# Patient Record
Sex: FEMALE | Race: WHITE | Employment: UNEMPLOYED | URBAN - METROPOLITAN AREA
[De-identification: names, ages, dates, MRNs, and addresses within clinical notes are randomized per-mention and may not be internally consistent; named-entity substitution may affect disease eponyms.]

---

## 2019-03-21 ENCOUNTER — APPOINTMENT (OUTPATIENT)
Dept: GENERAL RADIOLOGY | Age: 59
DRG: 312 | End: 2019-03-21
Attending: EMERGENCY MEDICINE
Payer: MEDICARE

## 2019-03-21 ENCOUNTER — HOSPITAL ENCOUNTER (INPATIENT)
Age: 59
LOS: 5 days | Discharge: HOME | DRG: 312 | End: 2019-03-26
Attending: EMERGENCY MEDICINE | Admitting: FAMILY MEDICINE
Payer: MEDICARE

## 2019-03-21 DIAGNOSIS — R55 SYNCOPE AND COLLAPSE: ICD-10-CM

## 2019-03-21 DIAGNOSIS — I65.23 BILATERAL CAROTID ARTERY STENOSIS: ICD-10-CM

## 2019-03-21 DIAGNOSIS — I65.23 CAROTID STENOSIS, BILATERAL: Primary | ICD-10-CM

## 2019-03-21 PROBLEM — R06.02 SOB (SHORTNESS OF BREATH): Status: ACTIVE | Noted: 2019-03-21

## 2019-03-21 PROBLEM — R07.9 ACUTE CHEST PAIN: Status: ACTIVE | Noted: 2019-03-21

## 2019-03-21 PROBLEM — R00.0 SINUS TACHYCARDIA: Status: ACTIVE | Noted: 2019-03-21

## 2019-03-21 LAB
ALBUMIN SERPL-MCNC: 2.4 G/DL (ref 3.5–5)
ALBUMIN/GLOB SERPL: 0.5 {RATIO} (ref 1.1–2.2)
ALP SERPL-CCNC: 83 U/L (ref 45–117)
ALT SERPL-CCNC: 20 U/L (ref 12–78)
ANION GAP SERPL CALC-SCNC: 10 MMOL/L (ref 5–15)
AST SERPL-CCNC: 22 U/L (ref 15–37)
BASOPHILS # BLD: 0.1 K/UL (ref 0–0.1)
BASOPHILS NFR BLD: 1 % (ref 0–1)
BILIRUB SERPL-MCNC: 0.2 MG/DL (ref 0.2–1)
BUN SERPL-MCNC: 16 MG/DL (ref 6–20)
BUN/CREAT SERPL: 12 (ref 12–20)
CALCIUM SERPL-MCNC: 8.3 MG/DL (ref 8.5–10.1)
CHLORIDE SERPL-SCNC: 102 MMOL/L (ref 97–108)
CO2 SERPL-SCNC: 22 MMOL/L (ref 21–32)
COMMENT, HOLDF: NORMAL
CREAT SERPL-MCNC: 1.35 MG/DL (ref 0.55–1.02)
DIFFERENTIAL METHOD BLD: ABNORMAL
EOSINOPHIL # BLD: 0.1 K/UL (ref 0–0.4)
EOSINOPHIL NFR BLD: 2 % (ref 0–7)
ERYTHROCYTE [DISTWIDTH] IN BLOOD BY AUTOMATED COUNT: 17.2 % (ref 11.5–14.5)
GLOBULIN SER CALC-MCNC: 4.4 G/DL (ref 2–4)
GLUCOSE SERPL-MCNC: 98 MG/DL (ref 65–100)
HCT VFR BLD AUTO: 30.4 % (ref 35–47)
HGB BLD-MCNC: 8.9 G/DL (ref 11.5–16)
IMM GRANULOCYTES # BLD AUTO: 0.1 K/UL (ref 0–0.04)
IMM GRANULOCYTES NFR BLD AUTO: 1 % (ref 0–0.5)
LYMPHOCYTES # BLD: 1.9 K/UL (ref 0.8–3.5)
LYMPHOCYTES NFR BLD: 29 % (ref 12–49)
MCH RBC QN AUTO: 33 PG (ref 26–34)
MCHC RBC AUTO-ENTMCNC: 29.3 G/DL (ref 30–36.5)
MCV RBC AUTO: 112.6 FL (ref 80–99)
MONOCYTES # BLD: 0.5 K/UL (ref 0–1)
MONOCYTES NFR BLD: 8 % (ref 5–13)
NEUTS SEG # BLD: 3.9 K/UL (ref 1.8–8)
NEUTS SEG NFR BLD: 59 % (ref 32–75)
NRBC # BLD: 0 K/UL (ref 0–0.01)
NRBC BLD-RTO: 0 PER 100 WBC
PLATELET # BLD AUTO: 238 K/UL (ref 150–400)
PMV BLD AUTO: 10.3 FL (ref 8.9–12.9)
POTASSIUM SERPL-SCNC: 4.8 MMOL/L (ref 3.5–5.1)
PROT SERPL-MCNC: 6.8 G/DL (ref 6.4–8.2)
RBC # BLD AUTO: 2.7 M/UL (ref 3.8–5.2)
RBC MORPH BLD: ABNORMAL
RBC MORPH BLD: ABNORMAL
SAMPLES BEING HELD,HOLD: NORMAL
SODIUM SERPL-SCNC: 134 MMOL/L (ref 136–145)
TROPONIN I SERPL-MCNC: <0.05 NG/ML
WBC # BLD AUTO: 6.6 K/UL (ref 3.6–11)

## 2019-03-21 PROCEDURE — 99284 EMERGENCY DEPT VISIT MOD MDM: CPT

## 2019-03-21 PROCEDURE — 71046 X-RAY EXAM CHEST 2 VIEWS: CPT

## 2019-03-21 PROCEDURE — 80053 COMPREHEN METABOLIC PANEL: CPT

## 2019-03-21 PROCEDURE — 85025 COMPLETE CBC W/AUTO DIFF WBC: CPT

## 2019-03-21 PROCEDURE — 94761 N-INVAS EAR/PLS OXIMETRY MLT: CPT

## 2019-03-21 PROCEDURE — 36415 COLL VENOUS BLD VENIPUNCTURE: CPT

## 2019-03-21 PROCEDURE — 93005 ELECTROCARDIOGRAM TRACING: CPT

## 2019-03-21 PROCEDURE — 65660000000 HC RM CCU STEPDOWN

## 2019-03-21 PROCEDURE — 83036 HEMOGLOBIN GLYCOSYLATED A1C: CPT

## 2019-03-21 PROCEDURE — 84484 ASSAY OF TROPONIN QUANT: CPT

## 2019-03-21 RX ORDER — MIDODRINE HYDROCHLORIDE 2.5 MG/1
2.5 TABLET ORAL
COMMUNITY
End: 2022-08-25

## 2019-03-21 RX ORDER — DEXTROSE 50 % IN WATER (D50W) INTRAVENOUS SYRINGE
25-50 AS NEEDED
Status: DISCONTINUED | OUTPATIENT
Start: 2019-03-21 | End: 2019-03-26 | Stop reason: HOSPADM

## 2019-03-21 RX ORDER — POLYETHYLENE GLYCOL 3350 17 G/17G
17 POWDER, FOR SOLUTION ORAL DAILY
COMMUNITY

## 2019-03-21 RX ORDER — INSULIN LISPRO 100 [IU]/ML
INJECTION, SOLUTION INTRAVENOUS; SUBCUTANEOUS
Status: DISCONTINUED | OUTPATIENT
Start: 2019-03-21 | End: 2019-03-26 | Stop reason: HOSPADM

## 2019-03-21 RX ORDER — DULOXETIN HYDROCHLORIDE 30 MG/1
30 CAPSULE, DELAYED RELEASE ORAL 2 TIMES DAILY
Status: ON HOLD | COMMUNITY
End: 2022-09-01 | Stop reason: SDUPTHER

## 2019-03-21 RX ORDER — LANOLIN ALCOHOL/MO/W.PET/CERES
10 CREAM (GRAM) TOPICAL
COMMUNITY
End: 2022-09-01

## 2019-03-21 RX ORDER — SODIUM CHLORIDE 0.9 % (FLUSH) 0.9 %
5-40 SYRINGE (ML) INJECTION EVERY 8 HOURS
Status: DISCONTINUED | OUTPATIENT
Start: 2019-03-21 | End: 2019-03-26 | Stop reason: HOSPADM

## 2019-03-21 RX ORDER — PANTOPRAZOLE SODIUM 40 MG/1
40 TABLET, DELAYED RELEASE ORAL 2 TIMES DAILY
COMMUNITY

## 2019-03-21 RX ORDER — ROSUVASTATIN CALCIUM 40 MG/1
40 TABLET, COATED ORAL
COMMUNITY

## 2019-03-21 RX ORDER — LANOLIN ALCOHOL/MO/W.PET/CERES
325 CREAM (GRAM) TOPICAL 2 TIMES DAILY WITH MEALS
Status: ON HOLD | COMMUNITY
End: 2022-09-01 | Stop reason: SDUPTHER

## 2019-03-21 RX ORDER — LANOLIN ALCOHOL/MO/W.PET/CERES
400 CREAM (GRAM) TOPICAL DAILY
COMMUNITY

## 2019-03-21 RX ORDER — METFORMIN HYDROCHLORIDE 500 MG/1
500 TABLET ORAL 2 TIMES DAILY WITH MEALS
COMMUNITY
End: 2022-08-25

## 2019-03-21 RX ORDER — ASPIRIN 81 MG/1
81 TABLET ORAL DAILY
COMMUNITY

## 2019-03-21 RX ORDER — SODIUM CHLORIDE 0.9 % (FLUSH) 0.9 %
5-40 SYRINGE (ML) INJECTION AS NEEDED
Status: DISCONTINUED | OUTPATIENT
Start: 2019-03-21 | End: 2019-03-26 | Stop reason: HOSPADM

## 2019-03-21 RX ORDER — ACETAMINOPHEN 500 MG
1000 TABLET ORAL
COMMUNITY
End: 2022-09-01

## 2019-03-21 RX ORDER — BUSPIRONE HYDROCHLORIDE 10 MG/1
10 TABLET ORAL 3 TIMES DAILY
Status: ON HOLD | COMMUNITY
End: 2022-08-30 | Stop reason: SDUPTHER

## 2019-03-21 RX ORDER — MAGNESIUM SULFATE 100 %
4 CRYSTALS MISCELLANEOUS AS NEEDED
Status: DISCONTINUED | OUTPATIENT
Start: 2019-03-21 | End: 2019-03-26 | Stop reason: HOSPADM

## 2019-03-22 LAB
ANION GAP SERPL CALC-SCNC: 10 MMOL/L (ref 5–15)
APPEARANCE UR: ABNORMAL
ATRIAL RATE: 104 BPM
BACTERIA URNS QL MICRO: NEGATIVE /HPF
BASOPHILS # BLD: 0 K/UL (ref 0–0.1)
BASOPHILS NFR BLD: 0 % (ref 0–1)
BILIRUB UR QL: NEGATIVE
BUN SERPL-MCNC: 13 MG/DL (ref 6–20)
BUN/CREAT SERPL: 12 (ref 12–20)
CALCIUM SERPL-MCNC: 7.9 MG/DL (ref 8.5–10.1)
CALCULATED P AXIS, ECG09: 27 DEGREES
CALCULATED R AXIS, ECG10: -23 DEGREES
CALCULATED T AXIS, ECG11: 36 DEGREES
CHLORIDE SERPL-SCNC: 103 MMOL/L (ref 97–108)
CO2 SERPL-SCNC: 22 MMOL/L (ref 21–32)
COLOR UR: ABNORMAL
CREAT SERPL-MCNC: 1.13 MG/DL (ref 0.55–1.02)
DIAGNOSIS, 93000: NORMAL
DIFFERENTIAL METHOD BLD: ABNORMAL
EOSINOPHIL # BLD: 0.2 K/UL (ref 0–0.4)
EOSINOPHIL NFR BLD: 4 % (ref 0–7)
EPITH CASTS URNS QL MICRO: ABNORMAL /LPF
ERYTHROCYTE [DISTWIDTH] IN BLOOD BY AUTOMATED COUNT: 17.3 % (ref 11.5–14.5)
EST. AVERAGE GLUCOSE BLD GHB EST-MCNC: 120 MG/DL
GLUCOSE BLD STRIP.AUTO-MCNC: 132 MG/DL (ref 65–100)
GLUCOSE BLD STRIP.AUTO-MCNC: 139 MG/DL (ref 65–100)
GLUCOSE BLD STRIP.AUTO-MCNC: 183 MG/DL (ref 65–100)
GLUCOSE BLD STRIP.AUTO-MCNC: 63 MG/DL (ref 65–100)
GLUCOSE BLD STRIP.AUTO-MCNC: 69 MG/DL (ref 65–100)
GLUCOSE BLD STRIP.AUTO-MCNC: 81 MG/DL (ref 65–100)
GLUCOSE BLD STRIP.AUTO-MCNC: 86 MG/DL (ref 65–100)
GLUCOSE SERPL-MCNC: 96 MG/DL (ref 65–100)
GLUCOSE UR STRIP.AUTO-MCNC: NEGATIVE MG/DL
HBA1C MFR BLD: 5.8 % (ref 4.2–6.3)
HCT VFR BLD AUTO: 27 % (ref 35–47)
HCT VFR BLD AUTO: 29.3 % (ref 35–47)
HGB BLD-MCNC: 7.9 G/DL (ref 11.5–16)
HGB BLD-MCNC: 8.7 G/DL (ref 11.5–16)
HGB UR QL STRIP: ABNORMAL
IMM GRANULOCYTES # BLD AUTO: 0 K/UL (ref 0–0.04)
IMM GRANULOCYTES NFR BLD AUTO: 0 % (ref 0–0.5)
KETONES UR QL STRIP.AUTO: NEGATIVE MG/DL
LEUKOCYTE ESTERASE UR QL STRIP.AUTO: ABNORMAL
LYMPHOCYTES # BLD: 1.3 K/UL (ref 0.8–3.5)
LYMPHOCYTES NFR BLD: 31 % (ref 12–49)
MCH RBC QN AUTO: 32.4 PG (ref 26–34)
MCHC RBC AUTO-ENTMCNC: 29.3 G/DL (ref 30–36.5)
MCV RBC AUTO: 110.7 FL (ref 80–99)
MONOCYTES # BLD: 0.3 K/UL (ref 0–1)
MONOCYTES NFR BLD: 8 % (ref 5–13)
NEUTS SEG # BLD: 2.3 K/UL (ref 1.8–8)
NEUTS SEG NFR BLD: 57 % (ref 32–75)
NITRITE UR QL STRIP.AUTO: NEGATIVE
NRBC # BLD: 0 K/UL (ref 0–0.01)
NRBC BLD-RTO: 0 PER 100 WBC
P-R INTERVAL, ECG05: 134 MS
PH UR STRIP: 5 [PH] (ref 5–8)
PLATELET # BLD AUTO: 185 K/UL (ref 150–400)
PMV BLD AUTO: 10.3 FL (ref 8.9–12.9)
POTASSIUM SERPL-SCNC: 4.1 MMOL/L (ref 3.5–5.1)
PROT UR STRIP-MCNC: NEGATIVE MG/DL
Q-T INTERVAL, ECG07: 372 MS
QRS DURATION, ECG06: 104 MS
QTC CALCULATION (BEZET), ECG08: 489 MS
RBC # BLD AUTO: 2.44 M/UL (ref 3.8–5.2)
RBC #/AREA URNS HPF: ABNORMAL /HPF (ref 0–5)
RBC MORPH BLD: ABNORMAL
SERVICE CMNT-IMP: ABNORMAL
SERVICE CMNT-IMP: NORMAL
SODIUM SERPL-SCNC: 135 MMOL/L (ref 136–145)
SP GR UR REFRACTOMETRY: 1.01 (ref 1–1.03)
TROPONIN I SERPL-MCNC: <0.05 NG/ML
UA: UC IF INDICATED,UAUC: ABNORMAL
UROBILINOGEN UR QL STRIP.AUTO: 0.2 EU/DL (ref 0.2–1)
VENTRICULAR RATE, ECG03: 104 BPM
WBC # BLD AUTO: 4.1 K/UL (ref 3.6–11)
WBC URNS QL MICRO: ABNORMAL /HPF (ref 0–4)

## 2019-03-22 PROCEDURE — 87086 URINE CULTURE/COLONY COUNT: CPT

## 2019-03-22 PROCEDURE — 81001 URINALYSIS AUTO W/SCOPE: CPT

## 2019-03-22 PROCEDURE — 74011250637 HC RX REV CODE- 250/637: Performed by: FAMILY MEDICINE

## 2019-03-22 PROCEDURE — 80048 BASIC METABOLIC PNL TOTAL CA: CPT

## 2019-03-22 PROCEDURE — 36415 COLL VENOUS BLD VENIPUNCTURE: CPT

## 2019-03-22 PROCEDURE — 82962 GLUCOSE BLOOD TEST: CPT

## 2019-03-22 PROCEDURE — 65660000000 HC RM CCU STEPDOWN

## 2019-03-22 PROCEDURE — 87147 CULTURE TYPE IMMUNOLOGIC: CPT

## 2019-03-22 PROCEDURE — 85025 COMPLETE CBC W/AUTO DIFF WBC: CPT

## 2019-03-22 PROCEDURE — 85018 HEMOGLOBIN: CPT

## 2019-03-22 PROCEDURE — 84484 ASSAY OF TROPONIN QUANT: CPT

## 2019-03-22 RX ORDER — LANOLIN ALCOHOL/MO/W.PET/CERES
3 CREAM (GRAM) TOPICAL
Status: DISCONTINUED | OUTPATIENT
Start: 2019-03-22 | End: 2019-03-26 | Stop reason: HOSPADM

## 2019-03-22 RX ORDER — DULOXETIN HYDROCHLORIDE 30 MG/1
30 CAPSULE, DELAYED RELEASE ORAL 2 TIMES DAILY
Status: DISCONTINUED | OUTPATIENT
Start: 2019-03-22 | End: 2019-03-26 | Stop reason: HOSPADM

## 2019-03-22 RX ORDER — BUSPIRONE HYDROCHLORIDE 10 MG/1
10 TABLET ORAL 3 TIMES DAILY
Status: DISCONTINUED | OUTPATIENT
Start: 2019-03-22 | End: 2019-03-26 | Stop reason: HOSPADM

## 2019-03-22 RX ORDER — ACETAMINOPHEN 325 MG/1
650 TABLET ORAL
Status: DISCONTINUED | OUTPATIENT
Start: 2019-03-22 | End: 2019-03-22

## 2019-03-22 RX ORDER — DEXTROSE 50 % IN WATER (D50W) INTRAVENOUS SYRINGE
12.5-25 AS NEEDED
Status: DISCONTINUED | OUTPATIENT
Start: 2019-03-22 | End: 2019-03-26 | Stop reason: HOSPADM

## 2019-03-22 RX ORDER — POLYETHYLENE GLYCOL 3350 17 G/17G
17 POWDER, FOR SOLUTION ORAL DAILY
Status: DISCONTINUED | OUTPATIENT
Start: 2019-03-22 | End: 2019-03-26 | Stop reason: HOSPADM

## 2019-03-22 RX ORDER — OXYCODONE AND ACETAMINOPHEN 5; 325 MG/1; MG/1
1 TABLET ORAL
Status: DISCONTINUED | OUTPATIENT
Start: 2019-03-22 | End: 2019-03-26 | Stop reason: HOSPADM

## 2019-03-22 RX ORDER — ONDANSETRON 2 MG/ML
4 INJECTION INTRAMUSCULAR; INTRAVENOUS
Status: DISCONTINUED | OUTPATIENT
Start: 2019-03-22 | End: 2019-03-26 | Stop reason: HOSPADM

## 2019-03-22 RX ORDER — OXYCODONE AND ACETAMINOPHEN 5; 325 MG/1; MG/1
1 TABLET ORAL
Status: COMPLETED | OUTPATIENT
Start: 2019-03-22 | End: 2019-03-22

## 2019-03-22 RX ORDER — PANTOPRAZOLE SODIUM 40 MG/1
40 TABLET, DELAYED RELEASE ORAL 2 TIMES DAILY
Status: DISCONTINUED | OUTPATIENT
Start: 2019-03-22 | End: 2019-03-26 | Stop reason: HOSPADM

## 2019-03-22 RX ORDER — OXYCODONE AND ACETAMINOPHEN 5; 325 MG/1; MG/1
TABLET ORAL
Status: DISPENSED
Start: 2019-03-22 | End: 2019-03-22

## 2019-03-22 RX ORDER — MIDODRINE HYDROCHLORIDE 5 MG/1
2.5 TABLET ORAL
Status: DISCONTINUED | OUTPATIENT
Start: 2019-03-22 | End: 2019-03-23

## 2019-03-22 RX ORDER — LORAZEPAM 0.5 MG/1
0.5 TABLET ORAL
Status: DISCONTINUED | OUTPATIENT
Start: 2019-03-22 | End: 2019-03-26 | Stop reason: HOSPADM

## 2019-03-22 RX ORDER — ACETAMINOPHEN 325 MG/1
650 TABLET ORAL
Status: DISCONTINUED | OUTPATIENT
Start: 2019-03-22 | End: 2019-03-26 | Stop reason: HOSPADM

## 2019-03-22 RX ORDER — LANOLIN ALCOHOL/MO/W.PET/CERES
1 CREAM (GRAM) TOPICAL 2 TIMES DAILY WITH MEALS
Status: DISCONTINUED | OUTPATIENT
Start: 2019-03-22 | End: 2019-03-26 | Stop reason: HOSPADM

## 2019-03-22 RX ORDER — ROSUVASTATIN CALCIUM 40 MG/1
40 TABLET, COATED ORAL
Status: DISCONTINUED | OUTPATIENT
Start: 2019-03-22 | End: 2019-03-26 | Stop reason: HOSPADM

## 2019-03-22 RX ADMIN — OXYCODONE AND ACETAMINOPHEN 1 TABLET: 5; 325 TABLET ORAL at 20:45

## 2019-03-22 RX ADMIN — MIDODRINE HYDROCHLORIDE 2.5 MG: 5 TABLET ORAL at 07:58

## 2019-03-22 RX ADMIN — FERROUS SULFATE TAB 325 MG (65 MG ELEMENTAL FE) 325 MG: 325 (65 FE) TAB at 07:58

## 2019-03-22 RX ADMIN — MIDODRINE HYDROCHLORIDE 2.5 MG: 5 TABLET ORAL at 12:58

## 2019-03-22 RX ADMIN — DULOXETINE 30 MG: 30 CAPSULE, DELAYED RELEASE ORAL at 08:00

## 2019-03-22 RX ADMIN — POLYETHYLENE GLYCOL 3350 17 G: 17 POWDER, FOR SOLUTION ORAL at 08:00

## 2019-03-22 RX ADMIN — Medication 10 ML: at 21:16

## 2019-03-22 RX ADMIN — DULOXETINE 30 MG: 30 CAPSULE, DELAYED RELEASE ORAL at 17:15

## 2019-03-22 RX ADMIN — FERROUS SULFATE TAB 325 MG (65 MG ELEMENTAL FE) 325 MG: 325 (65 FE) TAB at 17:15

## 2019-03-22 RX ADMIN — Medication 10 ML: at 06:59

## 2019-03-22 RX ADMIN — OXYCODONE AND ACETAMINOPHEN 1 TABLET: 5; 325 TABLET ORAL at 01:32

## 2019-03-22 RX ADMIN — PANTOPRAZOLE SODIUM 40 MG: 40 TABLET, DELAYED RELEASE ORAL at 17:15

## 2019-03-22 RX ADMIN — ACETAMINOPHEN 650 MG: 325 TABLET ORAL at 15:09

## 2019-03-22 RX ADMIN — MIDODRINE HYDROCHLORIDE 2.5 MG: 5 TABLET ORAL at 17:15

## 2019-03-22 RX ADMIN — BUSPIRONE HYDROCHLORIDE 10 MG: 10 TABLET ORAL at 08:00

## 2019-03-22 RX ADMIN — BUSPIRONE HYDROCHLORIDE 10 MG: 10 TABLET ORAL at 21:16

## 2019-03-22 RX ADMIN — Medication 10 ML: at 00:44

## 2019-03-22 RX ADMIN — PANTOPRAZOLE SODIUM 40 MG: 40 TABLET, DELAYED RELEASE ORAL at 08:00

## 2019-03-22 RX ADMIN — ROSUVASTATIN CALCIUM 40 MG: 40 TABLET, FILM COATED ORAL at 21:16

## 2019-03-22 RX ADMIN — Medication 3 MG: at 21:40

## 2019-03-22 RX ADMIN — OXYCODONE AND ACETAMINOPHEN 1 TABLET: 5; 325 TABLET ORAL at 06:59

## 2019-03-22 RX ADMIN — Medication 3 MG: at 01:32

## 2019-03-22 RX ADMIN — OXYCODONE AND ACETAMINOPHEN 1 TABLET: 5; 325 TABLET ORAL at 12:58

## 2019-03-22 RX ADMIN — BUSPIRONE HYDROCHLORIDE 10 MG: 10 TABLET ORAL at 17:15

## 2019-03-22 NOTE — H&P
[address]  HISTORY AND PHYSICAL Name:  Apoorva Morton 
MR#:  [id number] :  1960 ACCOUNT #:  [de-identified] ADMIT DATE:  2019 CHIEF COMPLAINT:  Chest pain. HISTORY OF PRESENT ILLNESS:  A 62-year-old white female with past medical history of recent upper GI bleed, gastric, esophageal and duodenal ulcers, syncope, severe peripheral vascular disease, bilateral carotid artery stenosis, obesity, iron deficiency anemia, left ventricular hypertrophy, positional orthostatic tachycardia syndrome (POTS), hypertension, bronchitis, UTI, chronic kidney disease stage III, anxiety, prior sepsis, type 2 diabetes mellitus with peripheral autonomic neuropathy, prior multiple rib fractures, presented to the emergency department accompanied by her daughter and daughter's friends with chief complaint of chest pain. The patient states \"my daughter brought me here. \"  She notes that she came to the emergency department at the urging of her family/daughter with complaints of residual left-sided chest pain. She notably underwent a Heimlich maneuver when she was thought to have been choking with a syncopal episode at her grandson's baseball game two days ago. She notably has been hospitalized at [facility] secondary to the same. There was concern that patient had recently been diagnosed with bilateral carotid artery stenosis with 100% occlusion on the right and 69% occlusion on the left carotid artery, for which she has not undergone any intervention for the same.   She notably has been hospitalized at [facility] from 03/10/2019 to 2019, with diagnoses including acute GI bleed secondary to esophageal, gastric, and duodenal bleeding ulcers (requiring EGD on 2019), syncope and collapse, vasovagal with iron deficiency anemia (last recorded hemoglobin was 7.8), left ventricular hypertrophy (2D echocardiogram showing left ventricular ejection fraction 60% with grade 1 diastolic dysfunction), and POTS. The patient's daughter notes that the patient has not undergone any interventions. She notes that she was discharged from the hospital.  She was to follow up at [facility] for another evaluation, questionably by vascular surgeon. Unfortunately, she had another syncopal event, as mentioned has been hospitalized, but subsequently was discharged. The patient and her daughter notes that initially the plan was for the patient to be transferred to [facility] from [facility], but as the bed was not available, she was discharged and was to follow up on an outpatient basis per their report. 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 Approximately 50% focal stenosis at the origin of the left internal carotid artery and heavily calcified plaque at the origin of the nondominant right vertebral artery origin of uncertain significance. The patient tonight however complains of continuous right lower anterior chest and rib pain as well as some epigastric and left upper quadrant pain more close to the costophrenic angle, which has been present since she had the Heimlich maneuver. Tonight, she does not report having any recurrent syncopal episodes since that reported two days ago. She does not report having slurred speech, facial droop, numbness, paresthesias, focal weakness, visual disturbance, nausea, vomiting, diarrhea, melena, dysuria, calf pain, swelling, edema, fevers, chills, rash, head or neck trauma.   She complains of some shortness of breath while at rest.  Her daughter notes when the patient tried to ambulate earlier, she was having some lightheadedness. The patient also reports that she saw some blood on the toilet tissue when she wiped herself after urinating in the rest room. On arrival to the emergency department, her initial vital signs were not recorded; however, 12-lead EKG noted the 
patient to be having sinus tachycardia of 104 beats per minute. Labs showed a hemoglobin of 8.9 (compared to 7.8 from lab work at [facility]). Creatinine equals 1.35 with last creatinine 1.51 at [facility]. The patient notes that she has known history of stage III chronic kidney disease. ED requested the patient to be admitted to the hospitalist service for continued evaluation and treatment. PAST MEDICAL HISTORY: 
1. Acute GI bleed. 2.  Esophageal, gastric, and duodenal bleeding ulcers. 3.  Syncope and collapse. 4.  Severe peripheral vascular disease. 5.  Bilateral carotid artery stenosis. 6.  Obesity. 7.  Iron deficiency anemia. 8.  Left ventricular hypertrophy. 9.  Grade I diastolic dysfunction. 10.  Positional orthostatic tachycardia syndrome (POTS). 11.  Atypical chest pain. 12.  Acute bronchitis. 13.  Sepsis due to UTI. 14.  Chronic kidney disease stage III. 15.  Prior cellulitis of right foot. 16.  Right second distal toe amputation. 17.  Anxiety. 18.  Multiple rib fractures. 19.  Type 2 diabetes mellitus with peripheral autonomic neuropathy. 20.  History of osteomyelitis. 21.  NSTEMI, per transfer records. 22.  Prior right elbow effusion. 23.  Left pleural effusion. PAST SURGICAL HISTORY: 
1. Laparoscopic cholecystectomy. 2.  EGD, 03/13/2019. MEDICATIONS:  Medication list reviewed and noted on chart records. acetaminophen (TYLENOL) 500 mg tablet  3/21/2019  --  --  Provider, Historical   
 Take 1,000 mg by mouth every six (6) hours as needed for Pain. aspirin (ASPIRIN) 325 mg tablet  3/20/2019  --  --  Provider, Historical  
 Take 325 mg by mouth daily. busPIRone (BUSPAR) 10 mg tablet  3/21/2019  --  --  Provider, Historical  
 Take 10 mg by mouth three (3) times daily. DULoxetine (CYMBALTA) 30 mg capsule  3/21/2019  --  --  Provider, Historical  
 Take 30 mg by mouth two (2) times a day. ferrous sulfate 325 mg (65 mg iron) tablet  3/21/2019  --  --  Provider, Historical  
 Take  by mouth two (2) times daily (with meals). magnesium oxide (MAG-OX) 400 mg tablet  3/21/2019  --  --  Provider, Historical  
 Take 400 mg by mouth daily. melatonin 3 mg tablet    --  --  Provider, Historical  
 Take 10 mg by mouth nightly as needed. metFORMIN (GLUCOPHAGE) 500 mg tablet    --  --  Provider, Historical  
 Take 500 mg by mouth two (2) times daily (with meals). midodrine (PROAMITINE) 2.5 mg tablet    --  --  Provider, Historical  
 Take 2.5 mg by mouth three (3) times daily (with meals). pantoprazole (PROTONIX) 40 mg tablet  3/21/2019  --  --  Provider, Historical  
 Take 40 mg by mouth two (2) times a day. polyethylene glycol (MIRALAX) 17 gram packet  3/21/2019  --  --  Provider, Historical  
 Take 17 g by mouth daily. potassium chloride (KLOR-CON M10) 10 mEq tablet    07/01/11  -- Paris Sue, NP Take 1 Tab by mouth two (2) times a day. rosuvastatin (CRESTOR) 40 mg tablet  3/20/2019  --  --  Provider, Historical  
 Take 40 mg by mouth nightly. ALLERGIES:  GABAPENTIN, ACE INHIBITORS, LOS-SELTZER PLUS, HYDROCODONE-ACETAMINOPHEN, PEPTO-BISMOL. SOCIAL HISTORY:  Denies smoking and illicit drugs. Occasional alcohol, denies daily consumption. FAMILY HISTORY:  Heart disease - mother. Cancer unspecified - father. REVIEW OF SYSTEMS:  Pertinent positive as noted in HPI. Otherwise, all other systems were reviewed and negative.  
 
PHYSICAL EXAMINATION: 
 VITAL SIGNS:  Temperature 97.7 degrees Fahrenheit, blood pressure 155/93, heart rate 109, respiratory rate of 18, O2 saturation 99% on room air. Recorded weight of 198 pounds (89.8 kg). Recorded height of 5 feet 7 inches tall. BMI 31.0. GENERAL:  Obese female, in no acute respiratory distress. PSYCHIATRIC:  The patient is awake, alert, oriented x3. Anxious. NEUROLOGIC:  GCS of 15. Moves extremities x4. Sensation is grossly intact. No slurred speech or facial droop. HEENT:  Normocephalic, atraumatic. PERRLA. EOMs intact. Sclerae anicteric. Conjunctivae clear. Nares are patent. Oropharynx is clear. Tongue is midline, nonedematous. NECK:  Supple without lymphadenopathy, JVD, carotid bruits, or thyromegaly. LYMPHS:  Negative for cervical or supraclavicular adenopathy. LUNGS:  Clear to auscultation bilaterally. CVS/HEART:  Tachycardic, regular rhythm. No murmurs, rubs, or gallops. GI:  Abdomen is soft, mild tenderness in the epigastrium, left upper quadrant with tenderness adjacent to the left lower anterior chest wall on palpation without acute palpable bony deformity. No rebound, guarding, or rigidity. Normoactive bowel sounds. No auscultated abdominal bruits or palpable abdominal mass. BACK:  No CVA tenderness. No step-off deformity. MUSCULOSKELETAL:  No acute palpable bony deformity. Negative calf tenderness. VASCULAR:  2+ radial, 1+ dorsalis pedis pulses without cyanosis, clubbing, or edema. SKIN:  Warm and dry. EXTREMITIES:  Distal phalanx of right second toe absent with amputation wound site healed and intact. LABORATORY DATA:  Labs are reviewed as follows:  Sodium 134, potassium 4.8, chloride 102, CO2 of 22, BUN of 16, creatinine 1.35, glucose 98, anion gap of 10, calcium of 8.3, GFR 40, total bilirubin 0.2, total protein 6.8, albumin 2.4, ALT 20, AST 22, alkaline phosphatase 83. Troponin I less than 0.05.   WBC of 6.6, hemoglobin 8.9, hematocrit 30.4, platelets 806, neutrophils of 59%, MCV of 112.6, MCHC of 29.3. Chest x-ray, AP and lateral shows clear lungs, no acute process with fracture of the right fifth rib laterally, age indeterminate and questionable fracture of the left mid rib. 12-lead EKG, sinus tachycardia 104 beats per minute. IMPRESSION AND PLAN: 
1. Bilateral carotid artery stenosis. At current, the patient has no focal neurological deficits on exam.  However, concern is she has been lost to follow up, especially in light of these recent hospitalizations. We will admit the patient to the telemetry floor. Consult with vascular surgeon in a.m. 
2.  Acute chest pain - more likely musculoskeletal as there is elicited tenderness on palpation on exam with recent Heimlich maneuver causing trauma to the left lower chest and left upper quadrant region. However, as the patient has noted significant cardiac risk factors, I will repeat troponin level. Continue telemetry monitoring and cardiac workup. She will need the same if the plan is to consider for any vascular surgical intervention for carotid stenosis. 3.  Shortness of breath. Provide supplemental oxygen as needed. However, she is currently oxygenating well on room air. 4.  Anemia. Repeat H and H. Check iron profile, B12 level. She has had a recent EGD which had revealed esophageal, gastric, and duodenal ulcers. She is not reporting recurrence of rectal bleeding or hematemesis. 5.  Hematuria. Order UA with microscopy. 6.  Hypertension. Monitor blood pressure closely. Currently not on any medications for the same. Provide antihypertensive medications as needed. 7.  Stage III chronic kidney disease. Repeat renal panel in the a.m. 
8.  Sinus tachycardia. Continue telemetry monitoring. 9.  Anxiety. Resume the patient back on home medications for the same. 10.  Hyperlipidemia. Check lipid panel. Continue statin therapy. 11.  Type 2 diabetes mellitus. Place on Humalog insulin correction coverage schedule, Accu-Chek and check hemoglobin A1c level. 12.  Peripheral vascular disease. Plan as noted above. Consult Vascular Surgery service as above. 13.  Venous thromboembolism prophylaxis, sequential compression devices to lower extremities. Nabeel Goodrich MD [id number] 
D:  03/21/2019 23:49 T:  03/22/2019 2:00 
JOB #:  [id number]

## 2019-03-22 NOTE — PROGRESS NOTES
Hospitalist Progress Note          Jesse Hernandez MD  Please call  and page for questions. Call physician on-call through the  7pm-7am    Daily Progress Note: 3/22/2019    Primary care provider:Rozina Winslow MD    Date of admission: 3/21/2019  9:01 PM    Admission summery and hospital course:  69-year-old white female with past medical history of recent upper GI bleed, gastric, esophageal and duodenal ulcers, syncope, severe peripheral vascular disease, bilateral carotid artery stenosis, obesity, iron deficiency anemia, left ventricular hypertrophy, positional orthostatic tachycardia syndrome (POTS), hypertension, bronchitis, UTI, chronic kidney disease stage III, anxiety, prior sepsis, type 2 diabetes mellitus with peripheral autonomic neuropathy, prior multiple rib fractures, presented to the emergency department accompanied by her daughter and daughter's friends with chief complaint of chest pain. The patient states \"my daughter brought me here. \"  She notes that she came to the emergency department at the urging of her family/daughter with complaints of residual left-sided chest pain. She notably underwent a Heimlich maneuver when she was thought to have been choking with a syncopal episode at her grandson's baseball game two days ago. She notably has been hospitalized at [facility] secondary to the same. There was concern that patient had recently been diagnosed with bilateral carotid artery stenosis with 100% occlusion on the right and 69% occlusion on the left carotid artery, for which she has not undergone any intervention for the same.   She notably has been hospitalized at [facility] from 03/10/2019 to 03/14/2019, with diagnoses including acute GI bleed secondary to esophageal, gastric, and duodenal bleeding ulcers (requiring EGD on 03/13/2019), syncope and collapse, vasovagal with iron deficiency anemia (last recorded hemoglobin was 7.8), left ventricular hypertrophy (2D echocardiogram showing left ventricular ejection fraction 60% with grade 1 diastolic dysfunction), and POTS. The patient's daughter notes that the patient has not undergone any interventions. She notes that she was discharged from the hospital.  She was to follow up at [facility] for another evaluation, questionably by vascular surgeon. Unfortunately, she had another syncopal event, as mentioned has been hospitalized, but subsequently was discharged. The patient and her daughter notes that initially the plan was for the patient to be transferred to [facility] from [facility], but as the bed was not available, she was discharged and was to follow up on an outpatient basis per their report. 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 Approximately 50% focal stenosis at the origin of the left internal carotid artery and heavily calcified plaque at the origin of the nondominant right vertebral artery origin of uncertain significance. The patient tonight however complains of continuous right lower anterior chest and rib pain as well as some epigastric and left upper quadrant pain more close to the costophrenic angle, which has been present since she had the Heimlich maneuver. Tonight, she does not report having any recurrent syncopal episodes since that reported two days ago. She complains of some shortness of breath while at rest.  Her daughter notes when the patient tried to ambulate earlier, she was having some lightheadedness.   The patient also reports that she saw some blood on the toilet tissue when she wiped herself after urinating in the rest room. On arrival to the emergency department, her initial vital signs were not recorded; however, 12-lead EKG noted the patient to be having sinus tachycardia of 104 beats per minute. Labs showed a hemoglobin of 8.9 (compared to 7.8 from lab work at [facility]). Creatinine equals 1.35 with last creatinine 1.51 at [facility]. The patient notes that she has known history of stage III chronic kidney disease. ED requested the patient to be admitted to the hospitalist service for continued evaluation and treatment. Subjective:   Patient said her chest is hurting during movement. Patient had black color stool this AM.   Assessment/Plan:   Bilateral carotid artery stenosis. No focal neurological deficits on exam.    Consult with vascular surgeon in a.m. Acute chest pain:   Likely musculoskeletal, tenderness on palpation on exam with recent Heimlich maneuver. Has noted significant cardiac risk factors. Troponin levels are negative. Continue telemetry. Shortness of breath. CXR with clear lung fields. Fracture of the right fifth rib laterally age indeterminant. Questionable fracture of a left mid rib   Supplemental oxygen as needed. Anemia. Acute on chronic. Due to gastric ulcer. Had black color stool this AM.    Monitor H and H Q12 and possibly blood transfusion if needed and GI consult. She has had a recent EGD which had revealed esophageal, gastric, and duodenal ulcers. Hold ASA for now. Hypertension. BP is mildly elevated, continue to monitor on current medicines. Stage III chronic kidney disease. Creatinine is stable. Anxiety. Patient is anxious, continue her Buspar and Cumbalta, add ativan as PRN. Type 2 diabetes mellitus. Hold metformin now and monitor with SSI. PVD      See orders for other plans.    VTE prophylaxis: SCD  Code status: Full  Discussed plan of care with Patient/Family and Nurse. Pre-admission lived at home. Discharge planning: Continue tele       Review of Systems:     Review of Systems:  Symptom  Y/N  Comments   Symptom  Y/N  Comments    Fever/Chills   n   Chest Pain  y On right side    Poor Appetite  n    Edema       Cough  n   Abdominal Pain       Sputum  n   Joint Pain      SOB/AMIN     Pruritis/Rash      Nausea/vomit     Tolerating PT/OT      Diarrhea     Tolerating Diet      Constipation     Other      Could not obtain due to:         Objective:   Physical Exam:     Visit Vitals  /82 (BP 1 Location: Right arm, BP Patient Position: At rest)   Pulse (!) 108   Temp 98 °F (36.7 °C)   Resp 16   Ht 5' 7\" (1.702 m)   Wt 93.5 kg (206 lb 2.1 oz)   LMP 2009   SpO2 93%   BMI 32.28 kg/m²      O2 Device: Room air    Temp (24hrs), Av.8 °F (36.6 °C), Min:97.7 °F (36.5 °C), Max:98 °F (36.7 °C)     07 -  1900  In: 240 [P.O.:240]  Out: 0    No intake/output data recorded. General:  Alert, cooperative, no distress, appears stated age. Lungs:   Clear to auscultation bilaterally. Chest wall:  Tenderness at the right lower aspect. Heart:  Regular rate and rhythm, S1, S2 normal, no murmur. Abdomen:   Soft, Tender at the epigastric area. Bowel sounds normal.    Extremities: Extremities normal, atraumatic, no cyanosis or edema. Skin: Skin color, texture, turgor normal. No rashes or lesions   Neurologic: CNII-XII intact. Data Review:       Recent Days:  Recent Labs     19   WBC 4.1 6.6   HGB 7.9* 8.9*   HCT 27.0* 30.4*    238     Recent Labs     19   * 134*   K 4.1 4.8    102   CO2 22 22   GLU 96 98   BUN 13 16   CREA 1.13* 1.35*   CA 7.9* 8.3*   ALB  --  2.4*   SGOT  --  22   ALT  --  20     No results for input(s): PH, PCO2, PO2, HCO3, FIO2 in the last 72 hours.     24 Hour Results:  Recent Results (from the past 24 hour(s))   EKG, 12 LEAD, INITIAL Collection Time: 03/21/19  8:26 PM   Result Value Ref Range    Ventricular Rate 104 BPM    Atrial Rate 104 BPM    P-R Interval 134 ms    QRS Duration 104 ms    Q-T Interval 372 ms    QTC Calculation (Bezet) 489 ms    Calculated P Axis 27 degrees    Calculated R Axis -23 degrees    Calculated T Axis 36 degrees    Diagnosis       Sinus tachycardia  When compared with ECG of 16-JUN-2010 13:17,  Nonspecific T wave abnormality, improved in Inferior leads  Confirmed by Juan José Seo M.D., [facility] ([id number]) on 3/22/2019 6:08:40 AM     SAMPLES BEING HELD    Collection Time: 03/21/19  8:41 PM   Result Value Ref Range    SAMPLES BEING HELD [id number]     COMMENT        Add-on orders for these samples will be processed based on acceptable specimen integrity and analyte stability, which may vary by analyte. TROPONIN I    Collection Time: 03/21/19  8:41 PM   Result Value Ref Range    Troponin-I, Qt. <0.05 <0.05 ng/mL   CBC WITH AUTOMATED DIFF    Collection Time: 03/21/19  8:41 PM   Result Value Ref Range    WBC 6.6 3.6 - 11.0 K/uL    RBC 2.70 (L) 3.80 - 5.20 M/uL    HGB 8.9 (L) 11.5 - 16.0 g/dL    HCT 30.4 (L) 35.0 - 47.0 %    .6 (H) 80.0 - 99.0 FL    MCH 33.0 26.0 - 34.0 PG    MCHC 29.3 (L) 30.0 - 36.5 g/dL    RDW 17.2 (H) 11.5 - 14.5 %    PLATELET 783 339 - 464 K/uL    MPV 10.3 8.9 - 12.9 FL    NRBC 0.0 0  WBC    ABSOLUTE NRBC 0.00 0.00 - 0.01 K/uL    NEUTROPHILS 59 32 - 75 %    LYMPHOCYTES 29 12 - 49 %    MONOCYTES 8 5 - 13 %    EOSINOPHILS 2 0 - 7 %    BASOPHILS 1 0 - 1 %    IMMATURE GRANULOCYTES 1 (H) 0.0 - 0.5 %    ABS. NEUTROPHILS 3.9 1.8 - 8.0 K/UL    ABS. LYMPHOCYTES 1.9 0.8 - 3.5 K/UL    ABS. MONOCYTES 0.5 0.0 - 1.0 K/UL    ABS. EOSINOPHILS 0.1 0.0 - 0.4 K/UL    ABS. BASOPHILS 0.1 0.0 - 0.1 K/UL    ABS. IMM.  GRANS. 0.1 (H) 0.00 - 0.04 K/UL    DF SMEAR SCANNED      RBC COMMENTS MACROCYTOSIS  PRESENT        RBC COMMENTS ANISOCYTOSIS  1+       METABOLIC PANEL, COMPREHENSIVE    Collection Time: 03/21/19  8:41 PM   Result Value Ref Range    Sodium 134 (L) 136 - 145 mmol/L    Potassium 4.8 3.5 - 5.1 mmol/L    Chloride 102 97 - 108 mmol/L    CO2 22 21 - 32 mmol/L    Anion gap 10 5 - 15 mmol/L    Glucose 98 65 - 100 mg/dL    BUN 16 6 - 20 MG/DL    Creatinine 1.35 (H) 0.55 - 1.02 MG/DL    BUN/Creatinine ratio 12 12 - 20      GFR est AA 49 (L) >60 ml/min/1.73m2    GFR est non-AA 40 (L) >60 ml/min/1.73m2    Calcium 8.3 (L) 8.5 - 10.1 MG/DL    Bilirubin, total 0.2 0.2 - 1.0 MG/DL    ALT (SGPT) 20 12 - 78 U/L    AST (SGOT) 22 15 - 37 U/L    Alk.  phosphatase 83 45 - 117 U/L    Protein, total 6.8 6.4 - 8.2 g/dL    Albumin 2.4 (L) 3.5 - 5.0 g/dL    Globulin 4.4 (H) 2.0 - 4.0 g/dL    A-G Ratio 0.5 (L) 1.1 - 2.2     HEMOGLOBIN A1C WITH EAG    Collection Time: 03/21/19  8:41 PM   Result Value Ref Range    Hemoglobin A1c 5.8 4.2 - 6.3 %    Est. average glucose 120 mg/dL   URINALYSIS W/ REFLEX CULTURE    Collection Time: 03/22/19 12:02 AM   Result Value Ref Range    Color YELLOW/STRAW      Appearance CLOUDY (A) CLEAR      Specific gravity 1.015 1.003 - 1.030      pH (UA) 5.0 5.0 - 8.0      Protein NEGATIVE  NEG mg/dL    Glucose NEGATIVE  NEG mg/dL    Ketone NEGATIVE  NEG mg/dL    Bilirubin NEGATIVE  NEG      Blood TRACE (A) NEG      Urobilinogen 0.2 0.2 - 1.0 EU/dL    Nitrites NEGATIVE  NEG      Leukocyte Esterase SMALL (A) NEG      WBC 5-10 0 - 4 /hpf    RBC 0-5 0 - 5 /hpf    Epithelial cells MODERATE (A) FEW /lpf    Bacteria NEGATIVE  NEG /hpf    UA:UC IF INDICATED URINE CULTURE ORDERED (A) CNI     GLUCOSE, POC    Collection Time: 03/22/19 12:03 AM   Result Value Ref Range    Glucose (POC) 69 65 - 100 mg/dL    Performed by Braulio Goodrich, POC    Collection Time: 03/22/19 12:29 AM   Result Value Ref Range    Glucose (POC) 63 (L) 65 - 100 mg/dL    Performed by Braulio Goodrich, POC    Collection Time: 03/22/19 12:48 AM   Result Value Ref Range    Glucose (POC) 81 65 - 100 mg/dL    Performed by Anni Morocho METABOLIC PANEL, BASIC    Collection Time: 03/22/19  2:20 AM   Result Value Ref Range    Sodium 135 (L) 136 - 145 mmol/L    Potassium 4.1 3.5 - 5.1 mmol/L    Chloride 103 97 - 108 mmol/L    CO2 22 21 - 32 mmol/L    Anion gap 10 5 - 15 mmol/L    Glucose 96 65 - 100 mg/dL    BUN 13 6 - 20 MG/DL    Creatinine 1.13 (H) 0.55 - 1.02 MG/DL    BUN/Creatinine ratio 12 12 - 20      GFR est AA 60 (L) >60 ml/min/1.73m2    GFR est non-AA 49 (L) >60 ml/min/1.73m2    Calcium 7.9 (L) 8.5 - 10.1 MG/DL   CBC WITH AUTOMATED DIFF    Collection Time: 03/22/19  2:20 AM   Result Value Ref Range    WBC 4.1 3.6 - 11.0 K/uL    RBC 2.44 (L) 3.80 - 5.20 M/uL    HGB 7.9 (L) 11.5 - 16.0 g/dL    HCT 27.0 (L) 35.0 - 47.0 %    .7 (H) 80.0 - 99.0 FL    MCH 32.4 26.0 - 34.0 PG    MCHC 29.3 (L) 30.0 - 36.5 g/dL    RDW 17.3 (H) 11.5 - 14.5 %    PLATELET 014 893 - 522 K/uL    MPV 10.3 8.9 - 12.9 FL    NRBC 0.0 0  WBC    ABSOLUTE NRBC 0.00 0.00 - 0.01 K/uL    NEUTROPHILS 57 32 - 75 %    LYMPHOCYTES 31 12 - 49 %    MONOCYTES 8 5 - 13 %    EOSINOPHILS 4 0 - 7 %    BASOPHILS 0 0 - 1 %    IMMATURE GRANULOCYTES 0 0.0 - 0.5 %    ABS. NEUTROPHILS 2.3 1.8 - 8.0 K/UL    ABS. LYMPHOCYTES 1.3 0.8 - 3.5 K/UL    ABS. MONOCYTES 0.3 0.0 - 1.0 K/UL    ABS. EOSINOPHILS 0.2 0.0 - 0.4 K/UL    ABS. BASOPHILS 0.0 0.0 - 0.1 K/UL    ABS. IMM.  GRANS. 0.0 0.00 - 0.04 K/UL    DF SMEAR SCANNED      RBC COMMENTS MACROCYTOSIS  1+        RBC COMMENTS ANISOCYTOSIS  1+        RBC COMMENTS POLYCHROMASIA  PRESENT       TROPONIN I    Collection Time: 03/22/19  2:20 AM   Result Value Ref Range    Troponin-I, Qt. <0.05 <0.05 ng/mL   GLUCOSE, POC    Collection Time: 03/22/19  6:42 AM   Result Value Ref Range    Glucose (POC) 86 65 - 100 mg/dL    Performed by Kelvin Esposito, POC    Collection Time: 03/22/19 11:23 AM   Result Value Ref Range    Glucose (POC) 139 (H) 65 - 100 mg/dL    Performed by Souleymane Winn  PCT        Problem List:  Problem List as of 3/22/2019 Date Reviewed: 3/21/2019          Codes Class Noted - Resolved    Sinus tachycardia ICD-10-CM: R00.0  ICD-9-CM: 427.89  3/21/2019 - Present        SOB (shortness of breath) ICD-10-CM: R06.02  ICD-9-CM: 786.05  3/21/2019 - Present        Acute chest pain ICD-10-CM: R07.9  ICD-9-CM: 786.50  3/21/2019 - Present        * (Principal) Bilateral carotid artery stenosis ICD-10-CM: I65.23  ICD-9-CM: 433.10, 433.30  3/21/2019 - Present        DM (diabetes mellitus) ([facility] 75.) ICD-10-CM: E11.9  ICD-9-CM: 250.00  1/28/2010 - Present        HTN (hypertension) ICD-10-CM: I10  ICD-9-CM: 401.9  1/28/2010 - Present        Hyperlipidemia ICD-10-CM: E78.5  ICD-9-CM: 272.4  1/28/2010 - Present        Anxiety ICD-10-CM: F41.9  ICD-9-CM: 300.00  1/28/2010 - Present              Medications reviewed  Current Facility-Administered Medications   Medication Dose Route Frequency    busPIRone (BUSPAR) tablet 10 mg  10 mg Oral TID    DULoxetine (CYMBALTA) capsule 30 mg  30 mg Oral BID    ferrous sulfate tablet 325 mg  1 Tab Oral BID WITH MEALS    melatonin tablet 3 mg  3 mg Oral QHS PRN    midodrine (PROAMITINE) tablet 2.5 mg  2.5 mg Oral TID WITH MEALS    pantoprazole (PROTONIX) tablet 40 mg  40 mg Oral BID    polyethylene glycol (MIRALAX) packet 17 g  17 g Oral DAILY    rosuvastatin (CRESTOR) tablet 40 mg  40 mg Oral QHS    sodium chloride (NS) flush 5-40 mL  5-40 mL IntraVENous Q8H    sodium chloride (NS) flush 5-40 mL  5-40 mL IntraVENous PRN    glucose chewable tablet 16 g  4 Tab Oral PRN    dextrose (D50W) injection syrg 12.5-25 g  25-50 mL IntraVENous PRN    glucagon (GLUCAGEN) injection 1 mg  1 mg IntraMUSCular PRN    insulin lispro (HUMALOG) injection   SubCUTAneous AC&HS       Care Plan discussed with: Patient/Family and Nurse    Total time spent with patient: 30 minutes.     Lorie Lund MD

## 2019-03-22 NOTE — PROGRESS NOTES
Problem: Falls - Risk of  Goal: *Absence of Falls  Description  Document [place] Fall Risk and appropriate interventions in the flowsheet.   3/22/2019 0321 by Kathie Harley  Outcome: Progressing Towards Goal  3/22/2019 0320 by Kathie Harley  Outcome: Progressing Towards Goal     Problem: Patient Education: Go to Patient Education Activity  Goal: Patient/Family Education  3/22/2019 0321 by Kathie Harley  Outcome: Progressing Towards Goal  3/22/2019 0320 by Kathie Harley  Outcome: Progressing Towards Goal

## 2019-03-22 NOTE — CONSULTS
[address] [facility].   [address] [facility] [address]        GASTROENTEROLOGY CONSULTATION NOTE  [facility]  [phone] office  [phone] NP/PA in-hospital cell phone M-F until 4:30PM  After 5PM or on weekends, please call  for physician on call        NAME:  Roxane Velez   :   1960   MRN:   [id number]       Referring Physician: Dr. Neftali Arias Date: 3/22/2019 1:50 PM    Chief Complaint: nausea     History of Present Illness:  Patient is a 60 y.o. who is seen in consultation at the request of Dr. Tanesha Loja for gastric ulcer and H/H trending down. Patient has a past medical history significant for recent upper GI bleed, severe peripheral vascular disease, bilateral carotid artery stenosis, obesity, iron deficiency anemia, left ventricular hypertrophy, positional orthostatic tachycardia syndrome (POTS), hypertension, chronic kidney disease stage III, anxiety, and type 2 diabetes mellitus. Patient presented to the ED with complaints of chest pain. She was admitted to the hospital on 3/21/19. Patient was admitted to [facility] from 3/10/19 to 3/14/19 with diagnoses to include an acute GI bleed. EGD (3/12/19) at [facility] by Dr. Efren Bond showed a normal duodenum (biopsied); medium amount of food in the gastric body; 3 non-bleeding cratered gastric ulcers in the gastric antrum with no stigmata of bleeding (largest 9 mm), biopsied; medium-sized hiatal hernia; circumferential salmon-colored mucosa suggestive of long-segment of Fitzpatrick's esophagus (at 35 cm, maximal longitudinal extent was 5 cm in length, biopsied); 4 cratered esophageal ulcers with no bleeding and no stigmata of recent bleeding (largest 10 mm). A repeat EGD was recommended in 2 months for ulcer healing. She was prescribed PPI BID. Patient has some complaints of nausea. Reflux has improved. No vomiting or abdominal pain.   No clear melena or hematochezia. Patient reports 2 dark green bowel movements since admission as well as a small drop of bright red blood (with urination). + NSAID use prior to EGD on 3/12/19. No anticoagulation. Occasional alcohol use. No tobacco use. History of cholecystectomy. I have reviewed the emergency room note, hospital admission note, notes by all other clinicians who have seen the patient during this hospitalization to date. I have reviewed the problem list and the reason for this hospitalization. I have reviewed the allergies and the medications the patient was taking at home prior to this hospitalization. PMH:  Past Medical History:   Diagnosis Date    Anxiety 1/28/2010    DM (diabetes mellitus) ([facility] [address] 1/28/2010    HTN (hypertension) 1/28/2010    Hyperlipidemia 1/28/2010       PSH:  No past surgical history on file. Allergies: Allergies   Allergen Reactions    Gabapentin Anaphylaxis     Swelling and rash    Ace Inhibitors Unknown (comments)    Shell-[place] Plus Other (comments)     Increased blood pressure 3/21/19- pt reports no allergy      Anexsia [Hydrocodone-Acetaminophen] Unknown (comments)     Pt reports no allergy      Pepto-Bismol [Bismuth Subsalicylate] Other (comments)     Tongue color change         Home Medications:  Prior to Admission Medications   Prescriptions Last Dose Informant Patient Reported? Taking? DULoxetine (CYMBALTA) 30 mg capsule 3/21/2019 at Unknown time  Yes Yes   Sig: Take 30 mg by mouth two (2) times a day. acetaminophen (TYLENOL) 500 mg tablet 3/21/2019 at Unknown time  Yes Yes   Sig: Take 1,000 mg by mouth every six (6) hours as needed for Pain. aspirin (ASPIRIN) 325 mg tablet 3/20/2019 at Unknown time  Yes Yes   Sig: Take 325 mg by mouth daily. busPIRone (BUSPAR) 10 mg tablet 3/21/2019 at Unknown time  Yes Yes   Sig: Take 10 mg by mouth three (3) times daily.    ferrous sulfate 325 mg (65 mg iron) tablet 3/21/2019 at Unknown time  Yes Yes Sig: Take  by mouth two (2) times daily (with meals). magnesium oxide (MAG-OX) 400 mg tablet 3/21/2019 at Unknown time  Yes Yes   Sig: Take 400 mg by mouth daily. melatonin 3 mg tablet   Yes Yes   Sig: Take 10 mg by mouth nightly as needed. metFORMIN (GLUCOPHAGE) 500 mg tablet   Yes Yes   Sig: Take 500 mg by mouth two (2) times daily (with meals). midodrine (PROAMITINE) 2.5 mg tablet   Yes No   Sig: Take 2.5 mg by mouth three (3) times daily (with meals). pantoprazole (PROTONIX) 40 mg tablet 3/21/2019 at Unknown time  Yes Yes   Sig: Take 40 mg by mouth two (2) times a day. polyethylene glycol (MIRALAX) 17 gram packet 3/21/2019 at Unknown time  Yes Yes   Sig: Take 17 g by mouth daily. potassium chloride (KLOR-CON M10) 10 mEq tablet   No No   Sig: Take 1 Tab by mouth two (2) times a day. rosuvastatin (CRESTOR) 40 mg tablet 3/20/2019 at Unknown time  Yes Yes   Sig: Take 40 mg by mouth nightly.       Facility-Administered Medications: None       Hospital Medications:  Current Facility-Administered Medications   Medication Dose Route Frequency    busPIRone (BUSPAR) tablet 10 mg  10 mg Oral TID    DULoxetine (CYMBALTA) capsule 30 mg  30 mg Oral BID    ferrous sulfate tablet 325 mg  1 Tab Oral BID WITH MEALS    melatonin tablet 3 mg  3 mg Oral QHS PRN    midodrine (PROAMITINE) tablet 2.5 mg  2.5 mg Oral TID WITH MEALS    pantoprazole (PROTONIX) tablet 40 mg  40 mg Oral BID    polyethylene glycol (MIRALAX) packet 17 g  17 g Oral DAILY    rosuvastatin (CRESTOR) tablet 40 mg  40 mg Oral QHS    acetaminophen (TYLENOL) tablet 650 mg  650 mg Oral Q4H PRN    oxyCODONE-acetaminophen (PERCOCET) 5-325 mg per tablet 1 Tab  1 Tab Oral Q6H PRN    ondansetron (ZOFRAN) injection 4 mg  4 mg IntraVENous Q6H PRN    LORazepam (ATIVAN) tablet 0.5 mg  0.5 mg Oral Q6H PRN    dextrose (D50W) injection syrg 12.5-25 g  12.5-25 g IntraVENous PRN    sodium chloride (NS) flush 5-40 mL  5-40 mL IntraVENous Q8H    sodium chloride (NS) flush 5-40 mL  5-40 mL IntraVENous PRN    glucose chewable tablet 16 g  4 Tab Oral PRN    dextrose (D50W) injection syrg 12.5-25 g  25-50 mL IntraVENous PRN    glucagon (GLUCAGEN) injection 1 mg  1 mg IntraMUSCular PRN    insulin lispro (HUMALOG) injection   SubCUTAneous AC&HS       Social History:  Social History     Tobacco Use    Smoking status: Former Smoker   Substance Use Topics    Alcohol use: Yes       Family History:  Family History   Problem Relation Age of Onset    Heart Disease Mother     Cancer Father        Review of Systems:  Constitutional: negative fever, negative chills, negative weight loss  Eyes:   negative visual changes  ENT:   negative sore throat, tongue or lip swelling  Respiratory:  negative cough, + dyspnea  Cards:  + for chest pain, negative palpitations, negative lower extremity edema  GI:   See HPI  :  negative for frequency, dysuria  Integument:  negative for rash and pruritus  Heme:  + for easy bruising, negative gum/nose bleeding  Musculoskeletal:negative for myalgias, back pain and muscle weakness; + rib cage pain  Neuro:    negative for headaches, + dizziness  Psych: + for feelings of anxiety, history of depression     Objective:     Patient Vitals for the past 8 hrs:   BP Temp Pulse Resp SpO2 Weight   03/22/19 1110 142/82 98 °F (36.7 °C) (!) 108 16 93 % --   03/22/19 0829 151/77 97.8 °F (36.6 °C) (!) 115 16 97 % --   03/22/19 0704 -- -- -- -- -- 93.5 kg (206 lb 2.1 oz)     03/22 0701 - 03/22 1900  In: 240 [P.O.:240]  Out: 0   No intake/output data recorded. EXAM:     CONST:  Pleasant female lying in bed, no acute distress, son is at the bedside   NEURO:  Alert and oriented x 3   HEENT: EOMI, no scleral icterus   LUNGS: CTA bilaterally anteriorly   CARD:  S1 S2   ABD:  Soft, non distended, no tenderness, no rebound, no guarding. + Bowel sounds.    EXT:  Warm   PSYCH: Full, not anxious     Data Review     Recent Labs     03/22/19  1300 03/22/19  0220 03/21/19 2041   WBC  --  4.1 6.6   HGB 8.7* 7.9* 8.9*   HCT 29.3* 27.0* 30.4*   PLT  --  185 238     Recent Labs     03/22/19 0220 03/21/19 2041   * 134*   K 4.1 4.8    102   CO2 22 22   BUN 13 16   CREA 1.13* 1.35*   GLU 96 98   CA 7.9* 8.3*     Recent Labs     03/21/19 2041   SGOT 22   AP 83   TP 6.8   ALB 2.4*   GLOB 4.4*     No results for input(s): INR, PTP, APTT in the last 72 hours. No lab exists for component: INREXT       Assessment:   · Anemia: Hgb 8.7 (7.9, 8.9 yesterday on admission, 9.0 on discharge from [facility] per patient report), platelets 261. No blood transfusion this admission. No signs of active GI bleeding. · History of upper GI bleed with EGD (3/12/19) at [facility]: non-bleeding gastric ulcers with no stigmata of bleeding; medium-sized hiatal hernia; salmon-colored mucosa suggestive of long-segment of Fitzpatrick's esophagus; non-bleeding esophageal ulcers. Patient Active Problem List   Diagnosis Code    DM (diabetes mellitus) ([facility] [address] E11.9    HTN (hypertension) I10    Hyperlipidemia E78.5    Anxiety F41.9    Sinus tachycardia R00.0    SOB (shortness of breath) R06.02    Acute chest pain R07.9    Bilateral carotid artery stenosis I65.23     Plan:     · PPI BID  · On oral iron supplementation  · Monitor CBC and transfuse as necessary  · If Hgb trending down and/or signs of active GI bleeding, next EGD  · Patient was discussed with and will be seen by Dr. Roxane Lyons  · Thank you for allowing me to participate in care of Deann Lowe     Signed By: Tanisha Umanzor     3/22/2019  1:50 PM       Gastroenterology Attending Physician attestation statement and comments. This patient was seen and examined by me in a face-to-face visit today. I reviewed the medical record including lab work, imaging and other provider notes. I confirmed the history as described above.  I spoke to the patient, reviewed the medical record including lab work, imaging and other provider notes. I discussed this case in detail with Donny MCCULLOUGH. I formulated an  assessment of this patient and developed a treatment plan. I agree with the above consultation note. I agree with the history, exam and assessment and plan as outlined in the note. I would like to add the following:     Dark green bowel movement-pt on iron. Hgb stable with recheck. Recent EGD with PUD without stigmata of recent hemorrhage. Continue with PPI. If signs of active GIB with progressive anemia, will consider EGD. Weekend coverage to follow.     Dr. Alfred Mckeon

## 2019-03-22 NOTE — PROGRESS NOTES
Reason for Admission:   Patient presented with bilateral carotid artery stenosis                   RRAT Score:  10                   Plan for utilizing home health:  TBD, no skilled nursing needs identified at this time                         Likelihood of Readmission: Moderate                         Transition of Care Plan:  TBD, anticipate home    The CM met with the patient at bedside in order to introduce the role of CM and assess for patient needs. The patient lives at home alone, verified demographics and insurance information- the patient has 7 children that all live locally to her. The patient verified PCP as Dr. Martin White with [facility]. The patient endorses being independent with ADLs and mobility prior to hospitalization, denied use of DME in the home other than blood pressure monitor. The patient denied any current home health services in place. The patient denied difficulty affording or accessing medications prior to hospitalization. The patient's family will transport home upon discharge. The patient was recently discharged from [facility], she became tearful when discussing her recent medical issues, but hopeful that she will be improving. The patient has a vascular surgery consultation pending. CM will continue to follow. KATHRINE Roalnd    Care Management Interventions  PCP Verified by CM: Yes(Patient is followed by Dr. Martin White with [facility])  Mode of Transport at Discharge:  Other (see comment)(Patient's children will transport her home upon discharge)  Transition of Care Consult (CM Consult): Discharge Planning  MyChart Signup: No  Discharge Durable Medical Equipment: No  Health Maintenance Reviewed: Yes  Physical Therapy Consult: No  Occupational Therapy Consult: No  Speech Therapy Consult: No  Current Support Network: Own Home, Lives Alone, Family Lives Nearby(Patient lives alone in own home but has 7 children that live locally to her)  Confirm Follow Up Transport: Family  Plan discussed with Pt/Family/Caregiver: Yes   Resource Information Provided?: No  Discharge Location  Discharge Placement: Home

## 2019-03-22 NOTE — ED NOTES
2239 assumed care of pt    2250 Dr. Deepak Bermudez @ bedside    [phone] TRANSFER - OUT REPORT:    Verbal report given to 30 Wiliam Nation RN(name) on Ozzie Omer  being transferred to [facility] (unit) for routine progression of care       Report consisted of patients Situation, Background, Assessment and   Recommendations(SBAR). Information from the following report(s) SBAR and ED Summary was reviewed with the receiving nurse. Lines:   Peripheral IV 03/21/19 Left Antecubital (Active)   Site Assessment Clean, dry, & intact 3/21/2019  8:42 PM   Phlebitis Assessment 0 3/21/2019  8:42 PM   Infiltration Assessment 0 3/21/2019  8:42 PM   Dressing Status Clean, dry, & intact 3/21/2019  8:42 PM   Dressing Type Transparent 3/21/2019  8:42 PM        Opportunity for questions and clarification was provided.       0003 blood glucose 69, pt given orange juice    0030 repeat blood glucose 63, pt provided more orange juice

## 2019-03-22 NOTE — ED PROVIDER NOTES
60 y.o. Female with significant medical history for Anxiety, Diabetes mellitus, HTN, and Hyperlipidemia who presents from home with chief complaint of chest pain. Patient states the onset of her symptoms were today after she took a shower. Patient states associated symptoms are SOB and anxiety. Patient reports ~1.5 weeks ago she experienced a syncopal episode in her house causing her to fall between her dinning room table and the wall. Her landlord came and called for EMS and she was admitted for 4 days to [facility] for evaluation. Pt had a w/u while there including MRI, echo, stress test, and tilt table test. She was found to have orthostatic hypotension and POTs at that time and was discharged home 1 week ago. She was then at her grandsons baseball game 2 days ago when the daughter states she passed out and was \"not breathing and blue. \" She was transported by EMS to [facility] and was found to have 100% right carotid stenosis at that time. Per daughter, the daughter was going to be admitted for surgery for the stenosis; however, the pt ended up being discharged home. When she called they told her to \"not leave the pt unattended\" and that they were not a [facility] and to f/u elsewhere. Today, the pt began experiencing SOB. The family is concerned leaving the pt alone and the pt has been experiencing worsening anxiety, so they brought her in for further evaluation. Patient is still experiencing some pain today on her ribs and back from the prior fall. Patient denies N/V/D. There are no other acute medical concerns at this time. Social hx: (+) Tobacco use (Former Smoker), (+) EtOH use, No illicit drug use. Note written by Ashley Neville, as dictated by Josse Arthur MD 9:35 PM      The history is provided by the patient and a relative. No  was used.         Past Medical History:   Diagnosis Date    Anxiety 1/28/2010    [facility]    HTN (hypertension) 1/28/2010    Hyperlipidemia 1/28/2010       No past surgical history on file. Family History:   Problem Relation Age of Onset    Heart Disease Mother     Cancer Father        Social History     Socioeconomic History    Marital status: LEGALLY      Spouse name: Not on file    Number of children: Not on file    Years of education: Not on file    Highest education level: Not on file   Occupational History    Not on file   Social Needs    Financial resource strain: Not on file    Food insecurity:     Worry: Not on file     Inability: Not on file    Transportation needs:     Medical: Not on file     Non-medical: Not on file   Tobacco Use    Smoking status: Former Smoker   Substance and Sexual Activity    Alcohol use: Yes    Drug use: No    Sexual activity: Never   Lifestyle    Physical activity:     Days per week: Not on file     Minutes per session: Not on file    Stress: Not on file   Relationships    Social connections:     Talks on phone: Not on file     Gets together: Not on file     Attends [employer] service: Not on file     Active member of club or organization: Not on file     Attends meetings of clubs or organizations: Not on file     Relationship status: Not on file    Intimate partner violence:     Fear of current or ex partner: Not on file     Emotionally abused: Not on file     Physically abused: Not on file     Forced sexual activity: Not on file   Other Topics Concern    Not on file   Social History Narrative    Not on file         ALLERGIES: Ace inhibitors; Shell-seltzer plus; Anexsia [hydrocodone-acetaminophen]; and Pepto-bismol [bismuth subsalicylate]    Review of Systems   Constitutional: Negative for chills and fever. Respiratory: Positive for shortness of breath. Cardiovascular: Positive for chest pain. Gastrointestinal: Negative for abdominal pain, constipation, diarrhea and vomiting.    Genitourinary: Positive for flank pain. Musculoskeletal: Positive for back pain. Neurological: Negative for dizziness and light-headedness. Psychiatric/Behavioral: The patient is nervous/anxious. All other systems reviewed and are negative. Vitals:    03/21/19 2018 03/21/19 2243   BP:  (!) 175/99   Pulse:  (!) 103   Resp:  18   Temp:  97.7 °F (36.5 °C)   SpO2: 100% 100%   Weight:  89.8 kg (198 lb)   Height:  5' 7\" (1.702 m)            Physical Exam   Constitutional: She is oriented to person, place, and time. She appears well-developed and well-nourished. HENT:   Head: Normocephalic and atraumatic. Eyes: Pupils are equal, round, and reactive to light. Neck: Normal range of motion. Neck supple. Cardiovascular: Normal rate and regular rhythm. Pulmonary/Chest: Effort normal and breath sounds normal.   Abdominal: Soft. She exhibits no distension. There is no tenderness. Neurological: She is alert and oriented to person, place, and time. Skin: Skin is warm and dry. Capillary refill takes less than 2 seconds. Psychiatric: Her behavior is normal. Her mood appears anxious. Nursing note and vitals reviewed. Note written by Ashley Cardenas, as dictated by Natty Nicole MD 9:35 PM    MDM  Number of Diagnoses or Management Options  Carotid stenosis, bilateral: new and requires workup  Syncope and collapse: established and worsening         Procedures           Hospitalist Marily for Admission  10:17 PM    ED Room Number: ER11/11  Patient Name and age:  Benjy Waters 60 y.o.  female  Working Diagnosis:   1. Carotid stenosis, bilateral    2. Syncope and collapse      Readmission: no  Isolation Requirements:  no  Recommended Level of Care:  telemetry  Code Status:  Full   Other:  Dx w severe bilateral carotid stenosis and @ [facility] and DCed home with multiple episodes of syncope and collapse. New chest pain and SOB.     CONSULT NOTE:  10:45 PM Natty Nicole MD communicated with Dr. Tristan Osuna, Consult for Hospitalist via [employer]. Discussed available diagnostic tests and clinical findings. He accepted the pt for admission. 10:45 PM  Patient is being admitted to the hospital.  The results of their tests and reasons for their admission have been discussed with them and/or available family. They convey agreement and understanding for the need to be admitted and for their admission diagnosis.

## 2019-03-22 NOTE — PROGRESS NOTES
Admission Medication Reconciliation:    Information obtained from:  patient, chart review, insurance claim information, medication discharge from [address] [place] [address] dated 3/20/19    Comments/Recommendations: All medications/allergies have been reviewed and updated; last medication administration times reviewed and recorded. The patient was a good historian and could speak to medications. Insurance claim information was used to confirm current medications, doses, and frequencies. The patient reports the carvedilol and hydralazine have been on hold. She also reports she had been taking her nitroglycerin which had been exacerbating her stomach ulcers. Changes made to Prior to Admission (PTA) Medication List:   ?   Medications Added:   - All medications except for the potassium chloride  ? Medications Changed:   - None   ? Medications Removed:   - alprazolam, amlodipine, HCTZ, nebivolol, pravastatin, sitagliptin       Significant PMH/Disease States:   Past Medical History:   Diagnosis Date    Anxiety 1/28/2010    DM (diabetes mellitus) ([facility] [address] 1/28/2010    HTN (hypertension) 1/28/2010    Hyperlipidemia 1/28/2010       Chief Complaint for this Admission:    Chief Complaint   Patient presents with    Shortness of Breath    Chest Pain       Allergies:  Gabapentin; Ace inhibitors; Shell-seltzer plus; Anexsia [hydrocodone-acetaminophen]; and Pepto-bismol [bismuth subsalicylate]    Prior to Admission Medications:   Prior to Admission Medications   Prescriptions Last Dose Informant Patient Reported? Taking? DULoxetine (CYMBALTA) 30 mg capsule 3/21/2019 at Unknown time  Yes Yes   Sig: Take 30 mg by mouth two (2) times a day. acetaminophen (TYLENOL) 500 mg tablet   Yes Yes   Sig: Take 1,000 mg by mouth every six (6) hours as needed for Pain. aspirin (ASPIRIN) 325 mg tablet 3/20/2019 at Unknown time  Yes Yes   Sig: Take 325 mg by mouth daily.    busPIRone (BUSPAR) 10 mg tablet 3/21/2019 at Unknown time  Yes Yes   Sig: Take 10 mg by mouth three (3) times daily. ferrous sulfate 325 mg (65 mg iron) tablet 3/21/2019 at Unknown time  Yes Yes   Sig: Take  by mouth two (2) times daily (with meals). magnesium oxide (MAG-OX) 400 mg tablet 3/21/2019 at Unknown time  Yes Yes   Sig: Take 400 mg by mouth daily. melatonin 3 mg tablet   Yes Yes   Sig: Take 10 mg by mouth nightly as needed. metFORMIN (GLUCOPHAGE) 500 mg tablet   Yes Yes   Sig: Take 500 mg by mouth two (2) times daily (with meals). midodrine (PROAMITINE) 2.5 mg tablet   Yes No   Sig: Take 2.5 mg by mouth three (3) times daily (with meals). pantoprazole (PROTONIX) 40 mg tablet 3/21/2019 at Unknown time  Yes Yes   Sig: Take 40 mg by mouth two (2) times a day. polyethylene glycol (MIRALAX) 17 gram packet 3/21/2019 at Unknown time  Yes Yes   Sig: Take 17 g by mouth daily. potassium chloride (KLOR-CON M10) 10 mEq tablet   No No   Sig: Take 1 Tab by mouth two (2) times a day. rosuvastatin (CRESTOR) 40 mg tablet 3/20/2019 at Unknown time  Yes Yes   Sig: Take 40 mg by mouth nightly. Facility-Administered Medications: None     Thank you for allowing pharmacy to participate in the coordination of this patient's care. If you have any other questions, please contact the medication reconciliation pharmacist at [phone]. Isabel Castañeda Pharm. D., [facility]

## 2019-03-22 NOTE — ED TRIAGE NOTES
Pt arrives ambulatory from home c/o chest pain, SOB, weakness, dizziness x a few weeks, worse today    Pt was recently admitted to hospitals 3 times in the past month for: internal bleeding, low potassium, falls, \"choking and turning blue\"; on that occasion she collapsed at a baseball game, went to hospital, they told her she needed surgery on carotid arteries, and then discharged her home

## 2019-03-22 NOTE — PROGRESS NOTES
Bedside and Verbal shift change report given to Starr Lindsay (oncoming nurse) by Norbert Smith (offgoing nurse). Report included the following information SBAR, Kardex, ED Summary, Intake/Output, MAR, Accordion, Recent Results, Med Rec Status and Cardiac Rhythm Sinus Tach. Problem: Falls - Risk of  Goal: *Absence of Falls  Description  Document Radha End Fall Risk and appropriate interventions in the flowsheet. Progressing toward goal     Problem: Patient Education: Go to Patient Education Activity  Goal: Patient/Family Education  Progressing toward goal    1500 Reached out to Will with GI. Informed him of patient going to bathroom and when she wiped she had small amount of bright red leonela blood on tissue. Examined the patient and did not see any obvious signs of bleeding. Small hemorrhoid noted, but not bleeding. Per Will, no further orders at this time and will continue to watch. 1930 Bedside and Verbal shift change report given to [address] [place] [address] Suzanna (oncoming nurse) by Starr Lindsay (offgoing nurse). Report included the following information SBAR, Kardex, ED Summary, Procedure Summary, Intake/Output, MAR, Accordion, Recent Results, Med Rec Status and Cardiac Rhythm Sinus Tach.

## 2019-03-22 NOTE — PROGRESS NOTES
TRANSFER - IN REPORT:    Verbal report received from Izzy Fregoso RN(name) [facility] from ED (unit) for routine progression of care      Report consisted of patients Situation, Background, Assessment and   Recommendations(SBAR). Information from the following report(s) SBAR, Intake/Output and Cardiac Rhythm Sinus tach was reviewed with the receiving nurse. Opportunity for questions and clarification was provided. Assessment completed upon patients arrival to unit and care assumed. Tele placed and verified with [facility].    0730 Bedside shift change report given to Eric Barney [facility] (oncoming nurse) by Kendell Rodgers RN (offgoing nurse). Report included the following information SBAR, Intake/Output, Recent Results and Cardiac Rhythm NSR.

## 2019-03-22 NOTE — DIABETES MGMT
DTC Progress Note    Recommendations/ Comments: Chart reviewed due to hypoglycemia. Pt with a blood sugar's in the 60's this morning. Pt has correction scale ordered but has not required any. If appropriate, please consider changing correction scale to high sensitivity. Current hospital DM medication: correction scale Humalog with normal sensitivity     Chart reviewed on Rex Mendez. Patient is a 60 y.o. female with known diabetes on Metformin 500 mg BID at home. A1c:   Lab Results   Component Value Date/Time    Hemoglobin A1c 5.8 03/21/2019 08:41 PM    Hemoglobin A1c 10.9 (H) 05/19/2011 11:27 AM       Recent Glucose Results:   Lab Results   Component Value Date/Time    GLU 96 03/22/2019 02:20 AM    GLU 98 03/21/2019 08:41 PM    GLUCPOC 139 (H) 03/22/2019 11:23 AM    GLUCPOC 86 03/22/2019 06:42 AM    GLUCPOC 81 03/22/2019 12:48 AM        Lab Results   Component Value Date/Time    Creatinine 1.13 (H) 03/22/2019 02:20 AM     Estimated Creatinine Clearance: 63 mL/min (A) (based on SCr of 1.13 mg/dL (H)). Active Orders   Diet    DIET CARDIAC Regular        PO intake:   Patient Vitals for the past 72 hrs:   % Diet Eaten   03/22/19 0758 100 %       Will continue to follow as needed.     Thank you    Navin Segura RD, CDE    Time spent: 5 minutes

## 2019-03-23 ENCOUNTER — APPOINTMENT (OUTPATIENT)
Dept: VASCULAR SURGERY | Age: 59
DRG: 312 | End: 2019-03-23
Attending: SURGERY
Payer: MEDICARE

## 2019-03-23 LAB
ALBUMIN SERPL-MCNC: 2.2 G/DL (ref 3.5–5)
ALBUMIN/GLOB SERPL: 0.5 {RATIO} (ref 1.1–2.2)
ALP SERPL-CCNC: 81 U/L (ref 45–117)
ALT SERPL-CCNC: 18 U/L (ref 12–78)
ANION GAP SERPL CALC-SCNC: 4 MMOL/L (ref 5–15)
AST SERPL-CCNC: 21 U/L (ref 15–37)
BACTERIA SPEC CULT: ABNORMAL
BACTERIA SPEC CULT: ABNORMAL
BASOPHILS # BLD: 0 K/UL (ref 0–0.1)
BASOPHILS NFR BLD: 0 % (ref 0–1)
BILIRUB SERPL-MCNC: 0.3 MG/DL (ref 0.2–1)
BUN SERPL-MCNC: 12 MG/DL (ref 6–20)
BUN/CREAT SERPL: 10 (ref 12–20)
CALCIUM SERPL-MCNC: 8.5 MG/DL (ref 8.5–10.1)
CC UR VC: ABNORMAL
CHLORIDE SERPL-SCNC: 101 MMOL/L (ref 97–108)
CO2 SERPL-SCNC: 30 MMOL/L (ref 21–32)
CREAT SERPL-MCNC: 1.21 MG/DL (ref 0.55–1.02)
DIFFERENTIAL METHOD BLD: ABNORMAL
EOSINOPHIL # BLD: 0.2 K/UL (ref 0–0.4)
EOSINOPHIL NFR BLD: 5 % (ref 0–7)
ERYTHROCYTE [DISTWIDTH] IN BLOOD BY AUTOMATED COUNT: 17.2 % (ref 11.5–14.5)
GLOBULIN SER CALC-MCNC: 4.4 G/DL (ref 2–4)
GLUCOSE BLD STRIP.AUTO-MCNC: 121 MG/DL (ref 65–100)
GLUCOSE BLD STRIP.AUTO-MCNC: 132 MG/DL (ref 65–100)
GLUCOSE BLD STRIP.AUTO-MCNC: 138 MG/DL (ref 65–100)
GLUCOSE BLD STRIP.AUTO-MCNC: 155 MG/DL (ref 65–100)
GLUCOSE SERPL-MCNC: 152 MG/DL (ref 65–100)
HCT VFR BLD AUTO: 29.4 % (ref 35–47)
HCT VFR BLD AUTO: 30 % (ref 35–47)
HCT VFR BLD AUTO: 30.1 % (ref 35–47)
HGB BLD-MCNC: 8.5 G/DL (ref 11.5–16)
HGB BLD-MCNC: 8.8 G/DL (ref 11.5–16)
HGB BLD-MCNC: 8.9 G/DL (ref 11.5–16)
IMM GRANULOCYTES # BLD AUTO: 0 K/UL (ref 0–0.04)
IMM GRANULOCYTES NFR BLD AUTO: 0 % (ref 0–0.5)
LYMPHOCYTES # BLD: 1 K/UL (ref 0.8–3.5)
LYMPHOCYTES NFR BLD: 30 % (ref 12–49)
MCH RBC QN AUTO: 32.4 PG (ref 26–34)
MCHC RBC AUTO-ENTMCNC: 29.3 G/DL (ref 30–36.5)
MCV RBC AUTO: 110.3 FL (ref 80–99)
MONOCYTES # BLD: 0.2 K/UL (ref 0–1)
MONOCYTES NFR BLD: 7 % (ref 5–13)
NEUTS SEG # BLD: 1.9 K/UL (ref 1.8–8)
NEUTS SEG NFR BLD: 58 % (ref 32–75)
NRBC # BLD: 0 K/UL (ref 0–0.01)
NRBC BLD-RTO: 0 PER 100 WBC
PLATELET # BLD AUTO: 216 K/UL (ref 150–400)
PMV BLD AUTO: 10.3 FL (ref 8.9–12.9)
POTASSIUM SERPL-SCNC: 4.2 MMOL/L (ref 3.5–5.1)
PROT SERPL-MCNC: 6.6 G/DL (ref 6.4–8.2)
RBC # BLD AUTO: 2.72 M/UL (ref 3.8–5.2)
RBC MORPH BLD: ABNORMAL
SERVICE CMNT-IMP: ABNORMAL
SODIUM SERPL-SCNC: 135 MMOL/L (ref 136–145)
WBC # BLD AUTO: 3.3 K/UL (ref 3.6–11)

## 2019-03-23 PROCEDURE — 80053 COMPREHEN METABOLIC PANEL: CPT

## 2019-03-23 PROCEDURE — 74011250636 HC RX REV CODE- 250/636: Performed by: FAMILY MEDICINE

## 2019-03-23 PROCEDURE — 74011250637 HC RX REV CODE- 250/637: Performed by: FAMILY MEDICINE

## 2019-03-23 PROCEDURE — 82962 GLUCOSE BLOOD TEST: CPT

## 2019-03-23 PROCEDURE — 36415 COLL VENOUS BLD VENIPUNCTURE: CPT

## 2019-03-23 PROCEDURE — 94760 N-INVAS EAR/PLS OXIMETRY 1: CPT

## 2019-03-23 PROCEDURE — 85018 HEMOGLOBIN: CPT

## 2019-03-23 PROCEDURE — 65660000000 HC RM CCU STEPDOWN

## 2019-03-23 PROCEDURE — 85025 COMPLETE CBC W/AUTO DIFF WBC: CPT

## 2019-03-23 PROCEDURE — 97161 PT EVAL LOW COMPLEX 20 MIN: CPT

## 2019-03-23 PROCEDURE — 93880 EXTRACRANIAL BILAT STUDY: CPT

## 2019-03-23 RX ORDER — HYDRALAZINE HYDROCHLORIDE 20 MG/ML
20 INJECTION INTRAMUSCULAR; INTRAVENOUS
Status: DISCONTINUED | OUTPATIENT
Start: 2019-03-23 | End: 2019-03-25

## 2019-03-23 RX ORDER — METOPROLOL TARTRATE 25 MG/1
25 TABLET, FILM COATED ORAL EVERY 12 HOURS
Status: DISCONTINUED | OUTPATIENT
Start: 2019-03-23 | End: 2019-03-26 | Stop reason: HOSPADM

## 2019-03-23 RX ADMIN — METOPROLOL TARTRATE 25 MG: 25 TABLET ORAL at 20:49

## 2019-03-23 RX ADMIN — DULOXETINE 30 MG: 30 CAPSULE, DELAYED RELEASE ORAL at 17:23

## 2019-03-23 RX ADMIN — Medication 10 ML: at 22:20

## 2019-03-23 RX ADMIN — BUSPIRONE HYDROCHLORIDE 10 MG: 10 TABLET ORAL at 15:54

## 2019-03-23 RX ADMIN — ACETAMINOPHEN 650 MG: 325 TABLET ORAL at 01:16

## 2019-03-23 RX ADMIN — OXYCODONE AND ACETAMINOPHEN 1 TABLET: 5; 325 TABLET ORAL at 04:37

## 2019-03-23 RX ADMIN — ROSUVASTATIN CALCIUM 40 MG: 40 TABLET, FILM COATED ORAL at 20:49

## 2019-03-23 RX ADMIN — FERROUS SULFATE TAB 325 MG (65 MG ELEMENTAL FE) 325 MG: 325 (65 FE) TAB at 08:47

## 2019-03-23 RX ADMIN — OXYCODONE AND ACETAMINOPHEN 1 TABLET: 5; 325 TABLET ORAL at 20:49

## 2019-03-23 RX ADMIN — FERROUS SULFATE TAB 325 MG (65 MG ELEMENTAL FE) 325 MG: 325 (65 FE) TAB at 16:00

## 2019-03-23 RX ADMIN — DULOXETINE 30 MG: 30 CAPSULE, DELAYED RELEASE ORAL at 08:47

## 2019-03-23 RX ADMIN — PANTOPRAZOLE SODIUM 40 MG: 40 TABLET, DELAYED RELEASE ORAL at 08:47

## 2019-03-23 RX ADMIN — ACETAMINOPHEN 650 MG: 325 TABLET ORAL at 15:54

## 2019-03-23 RX ADMIN — POLYETHYLENE GLYCOL 3350 17 G: 17 POWDER, FOR SOLUTION ORAL at 08:47

## 2019-03-23 RX ADMIN — METOPROLOL TARTRATE 25 MG: 25 TABLET ORAL at 08:47

## 2019-03-23 RX ADMIN — Medication 10 ML: at 15:57

## 2019-03-23 RX ADMIN — OXYCODONE AND ACETAMINOPHEN 1 TABLET: 5; 325 TABLET ORAL at 12:56

## 2019-03-23 RX ADMIN — LORAZEPAM 0.5 MG: 0.5 TABLET ORAL at 21:54

## 2019-03-23 RX ADMIN — PANTOPRAZOLE SODIUM 40 MG: 40 TABLET, DELAYED RELEASE ORAL at 17:23

## 2019-03-23 RX ADMIN — Medication 10 ML: at 07:24

## 2019-03-23 RX ADMIN — BUSPIRONE HYDROCHLORIDE 10 MG: 10 TABLET ORAL at 20:49

## 2019-03-23 RX ADMIN — HYDRALAZINE HYDROCHLORIDE 20 MG: 20 INJECTION INTRAMUSCULAR; INTRAVENOUS at 09:24

## 2019-03-23 RX ADMIN — BUSPIRONE HYDROCHLORIDE 10 MG: 10 TABLET ORAL at 08:47

## 2019-03-23 RX ADMIN — ACETAMINOPHEN 650 MG: 325 TABLET ORAL at 07:22

## 2019-03-23 NOTE — PROGRESS NOTES
Vascular Surgery  --reviewed all imaging  --I do not appreciate a lesion that explains her symptoms  --will consult neurointerventional on Monday to get another look at her imaging re: vertebro-basilar insufficiency

## 2019-03-23 NOTE — PROGRESS NOTES
0730: Bedside and Verbal shift change report given to Morteza Nazario (oncoming nurse) by Saranya Chatman (offgoing nurse). Report included the following information SBAR, Kardex, ED Summary, MAR, Recent Results and Cardiac Rhythm  ST. Problem: Falls - Risk of  Goal: *Absence of Falls  Description  Document Adalbertoyuly Alanis Fall Risk and appropriate interventions in the flowsheet. Outcome: Progressing Towards Goal  Note:   Fall Risk Interventions:   Medication Interventions: Evaluate medications/consider consulting pharmacy, Patient to call before getting OOB, Teach patient to arise slowly    Elimination Interventions: Call light in reach, Patient to call for help with toileting needs    History of Falls Interventions: Evaluate medications/consider consulting pharmacy, Room close to nurse's station, Assess for delayed presentation/identification of injury for 48 hrs (comment for end date), Utilize gait belt for transfer/ambulation    Up with standby assistance for syncopal episodes. Call bell and personal effects within reach. Bed locked and in low position. Non skid footwear in place. Problem: Syncope  Goal: *Absence of injury  Outcome: Progressing Towards Goal     Problem: Pressure Injury - Risk of  Goal: *Prevention of pressure injury  Description  Document Torrey Scale and appropriate interventions in the flowsheet.   Outcome: Progressing Towards Goal  Note:   Pressure Injury Interventions:   Turns self at appropriate intervals; skin assessed Q4H; blood glucose controlled

## 2019-03-23 NOTE — PROGRESS NOTES
Bedside and Verbal shift change report given to Brian Aly (oncoming nurse) by Janes Key (offgoing nurse). Report included the following information SBAR, Kardex, ED Summary, Intake/Output, MAR, Accordion, Recent Results, Med Rec Status and Cardiac Rhythm Sinus Tach. Problem: Falls - Risk of  Goal: *Absence of Falls  Description  Document Hamp Mustapha Fall Risk and appropriate interventions in the flowsheet. Outcome: Progressing Towards Goal     Problem: Syncope  Goal: *Absence of injury  Outcome: Progressing Towards Goal     Problem: Pressure Injury - Risk of  Goal: *Prevention of pressure injury  Description  Document Torrey Scale and appropriate interventions in the flowsheet. Outcome: Progressing Towards Goal     0800 Paged for team 10. Blood pressure elevated  0805 Dr. Kearney Parowan called. Will place orders after reviewing chart. 8472 Per Dr. Kearney Parowan repeat Jahu 80 closer to 56 and will repeat again prior to bed time. 1800 Reported to RN by PT that patient was orthostatic with dizziness during ambulation. Please see PT note    1930 Bedside and Verbal shift change report given to Pam(oncoming nurse) by Brian Aly (offgoing nurse). Report included the following information SBAR, Kardex, ED Summary, Intake/Output, MAR, Accordion, Recent Results, Med Rec Status and Cardiac Rhythm Sinus Tach.

## 2019-03-23 NOTE — PROGRESS NOTES
1930: Bedside shift change report given to [address] [place] [address] [place] (oncoming nurse) by Cody Oneil (offgoing nurse). Report included the following information SBAR, Intake/Output, MAR, Recent Results and Cardiac Rhythm NSR.     2330: Bedside shift change report given to Jonel Kwon (oncoming nurse) by [address] [place] [address] Suzanna (offgoing nurse). Report included the following information SBAR, Intake/Output, MAR, Recent Results and Cardiac Rhythm NSR.

## 2019-03-23 NOTE — CONSULTS
Vascular Surgery Consult  --full note dictated  --in brief, unclear etiology for syncope (100% KAT occlusion; 50% L ICA stenosis per CTA)  --will obtain duplex to assess for vertebral basilar insufficiency and will review all imagine  --d/w patient and family.

## 2019-03-23 NOTE — PROGRESS NOTES
[address]  [address] [facility], [address] [facility]    GI PROGRESS NOTE    NAME: Berta Mixon   :  1960   MRN:  [id number]       Subjective:   No melena, H/H stable    Review of Systems    Constitutional: negative fever, negative chills, negative weight loss  Eyes:   negative visual changes  ENT:   negative sore throat, tongue or lip swelling  Respiratory:  negative cough, negative dyspnea  Cards:  negative for chest pain, palpitations, lower extremity edema  GI:   See HPI  :  negative for frequency, dysuria  Integument:  negative for rash and pruritus  Heme:  negative for easy bruising and gum/nose bleeding  Musculoskel: negative for myalgias,  back pain and muscle weakness  Neuro: negative for headaches, dizziness, vertigo  Psych:  negative for feelings of anxiety, depression         Objective:     VITALS:   Last 24hrs VS reviewed since prior progress note. Most recent are:  Visit Vitals  /75 (BP 1 Location: Right arm, BP Patient Position: At rest)   Pulse 72   Temp 97.6 °F (36.4 °C)   Resp 16   Ht 5' 7\" (1.702 m)   Wt 92.4 kg (203 lb 11.3 oz)   SpO2 100%   BMI 31.90 kg/m²       Intake/Output Summary (Last 24 hours) at 3/23/2019 1421  Last data filed at 3/23/2019 1146  Gross per 24 hour   Intake 1200 ml   Output 800 ml   Net 400 ml     PHYSICAL EXAM:  General: WD, WN. Alert, cooperative, no acute distress    HEENT: NC, Atraumatic. PERRLA, EOMI. Anicteric sclerae. Lungs:  CTA Bilaterally. No Wheezing/Rhonchi/Rales. Heart:  Regular  rhythm,  No murmur (), No Rubs, No Gallops  Abdomen: Soft, Non distended, Non tender.  +Bowel sounds, no HSM  Extremities: No c/c/e  Neurologic:  CN 2-12 gi, Alert and oriented X 3. No acute neurological distress   Psych:   Good insight. Not anxious nor agitated.     Lab Data Reviewed:   Recent Labs     19  1010 19  0126  19  0220   WBC  --  3.3*  --  4.1   HGB 8.5* 8.8*   < > 7.9*   HCT 29.4* 30.0*   < > 27.0*   PLT  -- 216  --  185    < > = values in this interval not displayed.      Recent Labs     03/23/19  0126 03/22/19  0220   * 135*   K 4.2 4.1    103   CO2 30 22   BUN 12 13   CREA 1.21* 1.13*   * 96   CA 8.5 7.9*     Recent Labs     03/23/19  0126 03/21/19  2041   SGOT 21 22   AP 81 83   TP 6.6 6.8   ALB 2.2* 2.4*   GLOB 4.4* 4.4*       ________________________________________________________________________       Assessment:   · S/p acute GI blood loss anemia from peptic ulcer diseases, no more melena, H/H STABLE     Patient Active Problem List   Diagnosis Code    [facility] E11.9    HTN (hypertension) I10    Hyperlipidemia E78.5    Anxiety F41.9    Sinus tachycardia R00.0    SOB (shortness of breath) R06.02    Acute chest pain R07.9    Bilateral carotid artery stenosis I65.23     Plan:   · Continue on PPI  · Avoid NSAIDS  · Monitor H/H  · Dr. Louis Miranda will follow on monday     Signed By: German Rodriguez MD     3/23/2019  2:21 PM

## 2019-03-23 NOTE — PROGRESS NOTES
PHYSICAL THERAPY EVALUATION  Patient: Misti Nicholson (76 y.o. female)  Date: 3/23/2019  Primary Diagnosis: Acute chest pain [R07.9]  Bilateral carotid artery stenosis [I65.23]  SOB (shortness of breath) [R06.02]  Sinus tachycardia [R00.0]       Precautions: fall       ASSESSMENT :   Based on the objective data described below, the patient presents with Independent PLOF. Today presents with Modified independent level overall for transfers. Gait training completed 100 feet using a gait belt and at the Independent level of assistance. Patient mobility is good but limited by orthostatic hypotension. May benefit from compression hose for LE's. The following are barriers to independence while in acute care:   -Cognitive and/or behavioral: none  -Medical condition: orthostatic hypotension symptomatic and medical history    -Other:       Discharge recommendations: None from PT     Equipment recommendations for successful discharge (if) home: none       PLAN :  Discharging further skilled physical therapy at this time. SUBJECTIVE:   Patient stated My ribs are bothering me. Adore Mcneill    OBJECTIVE DATA SUMMARY:   HISTORY:    Past Medical History:   Diagnosis Date    Anxiety 1/28/2010    DM (diabetes mellitus) ([facility] 1/28/2010    HTN (hypertension) 1/28/2010    Hyperlipidemia 1/28/2010   No past surgical history on file. Prior Level of Function/Home Situation: independent  Personal factors and/or comorbidities impacting plan of care: history of falls with syncopal episodes    Home Situation  Home Environment: Trailer/mobile home  Wheelchair Ramp: Yes  One/Two Story Residence: One story  Living Alone: Yes  Support Systems: Family member(s)  Patient Expects to be Discharged to[de-identified] Private residence  Current DME Used/Available at Home: None    EXAMINATION/PRESENTATION/DECISION MAKING:   Critical Behavior:   age appropriate           Hearing:   Auditory  Auditory Impairment: None  Range Of Motion:  AROM: Within functional limits           PROM: Within functional limits           Strength:    Strength: Within functional limits            Tone & Sensation:   Tone: Normal      Sensation: Impaired(foot neuropathy)       Coordination:  Coordination: Within functional limits  Functional Mobility:  Bed Mobility:     Supine to Sit: Modified independent  Sit to Supine: Independent     Transfers:  Sit to Stand: Independent  Stand to Sit: Independent            Balance:   Sitting: Intact  Standing: Intact  Ambulation/Gait Training:  Distance (ft): 100 Feet (ft)  Assistive Device: Gait belt  Ambulation - Level of Assistance: Independent     Gait Description (WDL): Exceptions to WDL      Activity Tolerance:   signs and symptoms of orthostatic hypotension  Please refer to the flowsheet for vital signs taken during this treatment. Patient Vitals for the past 4 hrs:   Temp Pulse Resp BP SpO2   03/23/19 1523 98 °F (36.7 °C) 84 16 134/78 99 %   03/23/19 1514 -- 95 -- (!) 86/46 --   03/23/19 1509 -- 88 -- (!) 80/51 --   03/23/19 1505 -- 82 -- 119/67 --       After treatment patient left:   Supine in bed  Bed/Chair-wheels locked  Bed in low position  Call light within reach  RN notified     COMMUNICATION/EDUCATION:   The patients plan of care was discussed with: Registered Nurse.       Thank you for this referral.  Leidy Gutierrez, PT   Time Calculation: 16 mins

## 2019-03-23 NOTE — CONSULTS
[address]    Name:  Bethany Guzman  MR#:  [id number]  :  1960  ACCOUNT #:  [de-identified]  DATE OF SERVICE:  2019      REASON FOR CONSULTATION:  Syncope with carotid blockages. HISTORY OF PRESENT ILLNESS:  The patient is a 62-year-old female with a number of medical problems, who was sent to [facility] to be evaluated for multiple syncopal episodes. She has had some severe issues with this and was admitted to [facility], at which time she was diagnosed with acute GI bleed with upper GI bleeding ulcers, possible anemia, diastolic dysfunction amongst other issues. She had a CT angiogram of the neck and head that showed 100% blockage of the right internal carotid artery from the bifurcation to the cavernous region and 50% blockage of the left internal carotid artery. She has a vertebral artery blockage on the non-dominant side, which is the right. We are consulted for these issues. The patient has been told that she needs some kind of carotid surgery, presumably over the left side. PAST MEDICAL HISTORY:  Significant for:  1. Diabetes. 2.  Hypertension. 3.  Anxiety. 4.  Upper GI bleed. 5.  Obesity. 6.  History of anemia. 7.  Diastolic dysfunction. 8.  Orthostatic hypotension. 9.  Chronic kidney disease. MEDICATIONS PRIOR TO ADMISSION:  Include:  1. Tylenol. 2.  Aspirin. 3.  BuSpar. 4.  Cymbalta. 5.  Iron. 6.  Magnesium. 7.  Melatonin. 8.  Metformin. 9.  Protonix. 10.  MiraLax. 11.  Crestor. 12.  Midodrine. 13.  Potassium. ALLERGIES:  SHE IS ALLERGIC TO GABAPENTIN, ACE INHIBITORS, LOS-SELTZER. SOCIAL HISTORY:  Positive for prior tobacco use, occasional alcohol. REVIEW OF SYSTEMS:  Negative other than what has been mentioned. She does have a fair amount of chest and shoulder pain related to falling. She probably has bruised ribs.     PHYSICAL EXAMINATION:  VITAL SIGNS:  Temperature is 97.6, heart rate 105, blood pressure is 175/97, she is 95% on room air. GENERAL:  Currently in no acute distress. LUNGS:  Clear to auscultation bilaterally. HEART:  Regular rate and rhythm. ABDOMEN:  She is mildly tender in her abdomen, which she says is from her ribs. EXTREMITIES:  She has palpable pedal pulses as well as palpable radial pulses bilaterally and brachial artery pulses. LABORATORY DATA:  Her hemoglobin is 8.7. Her chemistries are mostly within normal limits. IMAGING:  As discussed above. I have been unable to review the images so far that were supposedly in the chart. IMPRESSION AND PLAN:  Syncope of unclear etiology. As of yet, I am unclear which carotid surgery she is supposed to be recommended for. The right side is 100% occluded and is not amenable to surgical revascularization. The left side is 50% stenosed and in general is unlikely to be the source of her syncope as she has had a negative MRI per the patient. I have ordered a carotid duplex to assess for vertebral artery insufficiency or reversal of flow as well as to reassess the left carotid and review all the imaging that I could find to help decide whether she would benefit from a procedure. Otherwise, I agree with transfusing beyond hemoglobin of 7 to ensure that her anemia was the source of this.       Angelina Ellis MD [id number]  D:  03/22/2019 20:28  T:  03/22/2019 21:30  JOB #:  [id number]

## 2019-03-23 NOTE — PROGRESS NOTES
Hospitalist Progress Note          Maci Correa MD  Please call  and page for questions. Call physician on-call through the  7pm-7am    Daily Progress Note: 3/23/2019    Primary care provider:Rozina Winslow MD    Date of admission: 3/21/2019  9:01 PM    Admission summery and hospital course:  62-year-old white female with past medical history of recent upper GI bleed, gastric, esophageal and duodenal ulcers, syncope, severe peripheral vascular disease, bilateral carotid artery stenosis, obesity, iron deficiency anemia, left ventricular hypertrophy, positional orthostatic tachycardia syndrome (POTS), hypertension, bronchitis, UTI, chronic kidney disease stage III, anxiety, prior sepsis, type 2 diabetes mellitus with peripheral autonomic neuropathy, prior multiple rib fractures, presented to the emergency department accompanied by her daughter and daughter's friends with chief complaint of chest pain.  The patient states \"my daughter brought me here. \"  She notes that she came to the emergency department at the urging of her family/daughter with complaints of residual left-sided chest pain.  She notably underwent a Heimlich maneuver when she was thought to have been choking with a syncopal episode at her grandson's baseball game two days ago. Levi uDtton notably has been hospitalized at [facility] secondary to the same. Pedro Au was concern that patient had recently been diagnosed with bilateral carotid artery stenosis with 100% occlusion on the right and 69% occlusion on the left carotid artery, for which she has not undergone any intervention for the same.  She notably has been hospitalized at [facility] from 03/10/2019 to 03/14/2019, with diagnoses including acute GI bleed secondary to esophageal, gastric, and duodenal bleeding ulcers (requiring EGD on 03/13/2019), syncope and collapse, vasovagal with iron deficiency anemia (last recorded hemoglobin was 7.8), left ventricular hypertrophy (2D echocardiogram showing left ventricular ejection fraction 60% with grade 1 diastolic dysfunction), and POTS.  The patient's daughter notes that the patient has not undergone any interventions.  She notes that she was discharged from the hospital. Johann Trujillo was to follow up at [facility] for another evaluation, questionably by vascular surgeon.  Unfortunately, she had another syncopal event, as mentioned has been hospitalized, but subsequently was discharged.  The patient and her daughter notes that initially the plan was for the patient to be transferred to [facility] from [facility], but as the bed was not available, she was discharged and was to follow up on an outpatient basis per their report. 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  Approximately 50% focal stenosis at the origin of the left internal carotid artery and heavily calcified plaque at the origin of the nondominant right vertebral artery origin of uncertain significance.   She complains of some shortness of breath while at rest.    Labs showed a hemoglobin of 8.9 (compared to 7.8 from lab work at [facility]).  Creatinine equals 1.35 with last creatinine 1.51 at [facility] patient notes that she has known history of stage III chronic kidney disease.  ED requested the patient to be admitted to the hospitalist service for continued evaluation and treatment. Subjective:   Patient said could now sleep last night. She said she is very anxious to know about the cause of her repeated fall. Assessment/Plan:   Recurrent syncope:  Probably multiple factors including uncontrolled BP, anemia, and carotic artery stenosis. Will PT/OT    Hypertension.    Uncontrolled BP. Will start with metoprolol which may help her anxiety as well. Hydralazine as PRN and monitor. Bilateral carotid artery stenosis. No focal neurological deficits on exam.    Appreciate vascular surgery input. Await duplex study of the vertebral basilar. Anemia. Acute on chronic. Due to PUD. H/H has not been trending down now. Appreciate GI input, will continue to monitor H/H. Continue iron supplement. She has had a recent EGD which had revealed esophageal, gastric, and duodenal ulcers.    Continue to hold ASA for now.      Acute chest pain:   Likely musculoskeletal, tenderness on palpation on exam with recent Heimlich maneuver. Has noted significant cardiac risk factors.  Troponin levels are negative. Continue telemetry.     Shortness of breath. CXR with clear lung fields. Fracture of the right fifth rib laterally age indeterminant. Questionable fracture of a left mid rib   Supplemental oxygen as needed.      Stage III chronic kidney disease. Creatinine is stable.      Anxiety.  Patient is anxious, continue her Buspar and Cumbalta, add ativan as PRN.      Type 2 diabetes mellitus.  Hold metformin now and monitor with SSI.      PVD      See orders for other plans. VTE prophylaxis: SCD  Code status: Full  Discussed plan of care with Patient/Family and Nurse. Pre-admission lived at home. Discharge planning: Continue tele for now. Review of Systems:     Review of Systems:  Symptom  Y/N  Comments   Symptom  Y/N  Comments    Fever/Chills   x   Chest Pain  y On fracture site.     Poor Appetite  x    Edema   n    Cough  x   Abdominal Pain   n    Sputum  x   Joint Pain      SOB/AMIN  x   Pruritis/Rash      Nausea/vomit     Tolerating PT/OT      Diarrhea     Tolerating Diet      Constipation     Other      Could not obtain due to:         Objective:   Physical Exam:     Visit Vitals  /86 (BP 1 Location: Right arm, BP Patient Position: At rest)   Pulse 91   Temp 97.8 °F (36.6 °C)   Resp 16   Ht 5' 7\" (1.702 m)   Wt 92.4 kg (203 lb 11.3 oz)   LMP 2009   SpO2 100%   BMI 31.90 kg/m²      O2 Device: Room air    Temp (24hrs), Av.9 °F (36.6 °C), Min:97.6 °F (36.4 °C), Max:98.1 °F (36.7 °C)    No intake/output data recorded.  1901 -  0700  In: 1080 [P.O.:1080]  Out: 500 [Urine:500]  General:  Alert, cooperative, no distress, appears stated age. Lungs:   Clear to auscultation bilaterally. Chest wall:  Tenderness at the right lower aspect. Heart:  Regular rate and rhythm, S1, S2 normal, no murmur. Abdomen:   Soft, Tender at the epigastric area. Bowel sounds normal.    Extremities: Extremities normal, atraumatic, no cyanosis or edema. Skin: Skin color, texture, turgor normal. No rashes or lesions   Neurologic: CNII-XII intact.       Data Review:       Recent Days:  Recent Labs     19  0126 19  1300 19  0220 19   WBC 3.3*  --  4.1 6.6   HGB 8.8* 8.7* 7.9* 8.9*   HCT 30.0* 29.3* 27.0* 30.4*     --  185 238     Recent Labs     19  0126 19  0220 19  204   * 135* 134*   K 4.2 4.1 4.8    103 102   CO2 30 22 22   * 96 98   BUN 12 13 16   CREA 1.21* 1.13* 1.35*   CA 8.5 7.9* 8.3*   ALB 2.2*  --  2.4*   SGOT 21  --  22   ALT 18  --  20     No results for input(s): PH, PCO2, PO2, HCO3, FIO2 in the last 72 hours.     24 Hour Results:  Recent Results (from the past 24 hour(s))   GLUCOSE, POC    Collection Time: 19 11:23 AM   Result Value Ref Range    Glucose (POC) 139 (H) 65 - 100 mg/dL    Performed by Celestino GARCIA    HGB & HCT    Collection Time: 19  1:00 PM   Result Value Ref Range    HGB 8.7 (L) 11.5 - 16.0 g/dL    HCT 29.3 (L) 35.0 - 47.0 %   GLUCOSE, POC    Collection Time: 19  5:19 PM   Result Value Ref Range    Glucose (POC) 132 (H) 65 - 100 mg/dL    Performed by [facility] JULY ANTON    DUPLEX CAROTID BILATERAL    Collection Time: 03/22/19  8:21 PM   Result Value Ref Range    Right cca dist sys 29.6 cm/s    Right CCA dist diop 6.1 cm/s    Right CCA prox sys 45.3 cm/s    Right CCA prox diop 0.0 cm/s    Right eca sys 162.8 cm/s    RIGHT EXTERNAL CAROTID ARTERY D 20.90 cm/s    Right ICA prox sys 0.0 cm/s    Right ICA prox diop 0.0 cm/s    Right vertebral sys 29.6 cm/s    RIGHT VERTEBRAL ARTERY D 12.53 cm/s    Right ICA/CCA sys 0.0     Left CCA dist sys 57.8 cm/s    Left CCA dist diop 21.5 cm/s    Left CCA prox sys 74.5 cm/s    Left CCA prox diop 18.9 cm/s    Left ECA sys 92.7 cm/s    LEFT EXTERNAL CAROTID ARTERY D 11.13 cm/s    Left ICA dist sys 124.5 cm/s    Left ICA dist diop 47.9 cm/s    Left ICA mid sys 111.6 cm/s    Left ICA mid diop 48.5 cm/s    Left ICA prox sys 173.3 cm/s    Left ICA prox diop 59.5 cm/s    Left vertebral sys 89.8 cm/s    LEFT VERTEBRAL ARTERY D 39.74 cm/s    Left ICA/CCA sys 3.00    GLUCOSE, POC    Collection Time: 03/22/19  9:15 PM   Result Value Ref Range    Glucose (POC) 183 (H) 65 - 100 mg/dL    Performed by Kallie Chino    CBC WITH AUTOMATED DIFF    Collection Time: 03/23/19  1:26 AM   Result Value Ref Range    WBC 3.3 (L) 3.6 - 11.0 K/uL    RBC 2.72 (L) 3.80 - 5.20 M/uL    HGB 8.8 (L) 11.5 - 16.0 g/dL    HCT 30.0 (L) 35.0 - 47.0 %    .3 (H) 80.0 - 99.0 FL    MCH 32.4 26.0 - 34.0 PG    MCHC 29.3 (L) 30.0 - 36.5 g/dL    RDW 17.2 (H) 11.5 - 14.5 %    PLATELET 775 358 - 992 K/uL    MPV 10.3 8.9 - 12.9 FL    NRBC 0.0 0  WBC    ABSOLUTE NRBC 0.00 0.00 - 0.01 K/uL    NEUTROPHILS 58 32 - 75 %    LYMPHOCYTES 30 12 - 49 %    MONOCYTES 7 5 - 13 %    EOSINOPHILS 5 0 - 7 %    BASOPHILS 0 0 - 1 %    IMMATURE GRANULOCYTES 0 0.0 - 0.5 %    ABS. NEUTROPHILS 1.9 1.8 - 8.0 K/UL    ABS. LYMPHOCYTES 1.0 0.8 - 3.5 K/UL    ABS. MONOCYTES 0.2 0.0 - 1.0 K/UL    ABS.  EOSINOPHILS 0.2 0.0 - 0.4 K/UL ABS. BASOPHILS 0.0 0.0 - 0.1 K/UL    ABS. IMM. GRANS. 0.0 0.00 - 0.04 K/UL    DF SMEAR SCANNED      RBC COMMENTS MACROCYTOSIS  1+        RBC COMMENTS ANISOCYTOSIS  1+        RBC COMMENTS POLYCHROMASIA  PRESENT       METABOLIC PANEL, COMPREHENSIVE    Collection Time: 03/23/19  1:26 AM   Result Value Ref Range    Sodium 135 (L) 136 - 145 mmol/L    Potassium 4.2 3.5 - 5.1 mmol/L    Chloride 101 97 - 108 mmol/L    CO2 30 21 - 32 mmol/L    Anion gap 4 (L) 5 - 15 mmol/L    Glucose 152 (H) 65 - 100 mg/dL    BUN 12 6 - 20 MG/DL    Creatinine 1.21 (H) 0.55 - 1.02 MG/DL    BUN/Creatinine ratio 10 (L) 12 - 20      GFR est AA 55 (L) >60 ml/min/1.73m2    GFR est non-AA 46 (L) >60 ml/min/1.73m2    Calcium 8.5 8.5 - 10.1 MG/DL    Bilirubin, total 0.3 0.2 - 1.0 MG/DL    ALT (SGPT) 18 12 - 78 U/L    AST (SGOT) 21 15 - 37 U/L    Alk.  phosphatase 81 45 - 117 U/L    Protein, total 6.6 6.4 - 8.2 g/dL    Albumin 2.2 (L) 3.5 - 5.0 g/dL    Globulin 4.4 (H) 2.0 - 4.0 g/dL    A-G Ratio 0.5 (L) 1.1 - 2.2     GLUCOSE, POC    Collection Time: 03/23/19  7:21 AM   Result Value Ref Range    Glucose (POC) 138 (H) 65 - 100 mg/dL    Performed by Luis Eduardo Fields        Problem List:  Problem List as of 3/23/2019 Date Reviewed: 3/21/2019          Codes Class Noted - Resolved    Sinus tachycardia ICD-10-CM: R00.0  ICD-9-CM: 427.89  3/21/2019 - Present        SOB (shortness of breath) ICD-10-CM: R06.02  ICD-9-CM: 786.05  3/21/2019 - Present        Acute chest pain ICD-10-CM: R07.9  ICD-9-CM: 786.50  3/21/2019 - Present        * (Principal) Bilateral carotid artery stenosis ICD-10-CM: I65.23  ICD-9-CM: 433.10, 433.30  3/21/2019 - Present        DM (diabetes mellitus) ([facility] 75.) ICD-10-CM: E11.9  ICD-9-CM: 250.00  1/28/2010 - Present        HTN (hypertension) ICD-10-CM: I10  ICD-9-CM: 401.9  1/28/2010 - Present        Hyperlipidemia ICD-10-CM: E78.5  ICD-9-CM: 272.4  1/28/2010 - Present        Anxiety ICD-10-CM: F41.9  ICD-9-CM: 300.00  1/28/2010 - Present Medications reviewed  Current Facility-Administered Medications   Medication Dose Route Frequency    hydrALAZINE (APRESOLINE) 20 mg/mL injection 20 mg  20 mg IntraVENous Q6H PRN    metoprolol tartrate (LOPRESSOR) tablet 25 mg  25 mg Oral Q12H    busPIRone (BUSPAR) tablet 10 mg  10 mg Oral TID    DULoxetine (CYMBALTA) capsule 30 mg  30 mg Oral BID    ferrous sulfate tablet 325 mg  1 Tab Oral BID WITH MEALS    melatonin tablet 3 mg  3 mg Oral QHS PRN    pantoprazole (PROTONIX) tablet 40 mg  40 mg Oral BID    polyethylene glycol (MIRALAX) packet 17 g  17 g Oral DAILY    rosuvastatin (CRESTOR) tablet 40 mg  40 mg Oral QHS    acetaminophen (TYLENOL) tablet 650 mg  650 mg Oral Q4H PRN    oxyCODONE-acetaminophen (PERCOCET) 5-325 mg per tablet 1 Tab  1 Tab Oral Q6H PRN    ondansetron (ZOFRAN) injection 4 mg  4 mg IntraVENous Q6H PRN    LORazepam (ATIVAN) tablet 0.5 mg  0.5 mg Oral Q6H PRN    dextrose (D50W) injection syrg 12.5-25 g  12.5-25 g IntraVENous PRN    sodium chloride (NS) flush 5-40 mL  5-40 mL IntraVENous Q8H    sodium chloride (NS) flush 5-40 mL  5-40 mL IntraVENous PRN    glucose chewable tablet 16 g  4 Tab Oral PRN    dextrose (D50W) injection syrg 12.5-25 g  25-50 mL IntraVENous PRN    glucagon (GLUCAGEN) injection 1 mg  1 mg IntraMUSCular PRN    insulin lispro (HUMALOG) injection   SubCUTAneous AC&HS       Care Plan discussed with: Patient/Family and Nurse    Total time spent with patient: 40 minutes.     Catracho Azevedo MD

## 2019-03-24 LAB
GLUCOSE BLD STRIP.AUTO-MCNC: 120 MG/DL (ref 65–100)
GLUCOSE BLD STRIP.AUTO-MCNC: 136 MG/DL (ref 65–100)
GLUCOSE BLD STRIP.AUTO-MCNC: 137 MG/DL (ref 65–100)
GLUCOSE BLD STRIP.AUTO-MCNC: 161 MG/DL (ref 65–100)
SERVICE CMNT-IMP: ABNORMAL

## 2019-03-24 PROCEDURE — 97530 THERAPEUTIC ACTIVITIES: CPT

## 2019-03-24 PROCEDURE — 94760 N-INVAS EAR/PLS OXIMETRY 1: CPT

## 2019-03-24 PROCEDURE — 74011250637 HC RX REV CODE- 250/637: Performed by: FAMILY MEDICINE

## 2019-03-24 PROCEDURE — 65660000000 HC RM CCU STEPDOWN

## 2019-03-24 PROCEDURE — 97535 SELF CARE MNGMENT TRAINING: CPT

## 2019-03-24 PROCEDURE — 82962 GLUCOSE BLOOD TEST: CPT

## 2019-03-24 PROCEDURE — 97165 OT EVAL LOW COMPLEX 30 MIN: CPT

## 2019-03-24 RX ADMIN — PANTOPRAZOLE SODIUM 40 MG: 40 TABLET, DELAYED RELEASE ORAL at 08:40

## 2019-03-24 RX ADMIN — ACETAMINOPHEN 650 MG: 325 TABLET ORAL at 06:28

## 2019-03-24 RX ADMIN — PANTOPRAZOLE SODIUM 40 MG: 40 TABLET, DELAYED RELEASE ORAL at 17:31

## 2019-03-24 RX ADMIN — FERROUS SULFATE TAB 325 MG (65 MG ELEMENTAL FE) 325 MG: 325 (65 FE) TAB at 17:31

## 2019-03-24 RX ADMIN — DULOXETINE 30 MG: 30 CAPSULE, DELAYED RELEASE ORAL at 08:40

## 2019-03-24 RX ADMIN — Medication 10 ML: at 22:15

## 2019-03-24 RX ADMIN — BUSPIRONE HYDROCHLORIDE 10 MG: 10 TABLET ORAL at 15:18

## 2019-03-24 RX ADMIN — POLYETHYLENE GLYCOL 3350 17 G: 17 POWDER, FOR SOLUTION ORAL at 08:40

## 2019-03-24 RX ADMIN — BUSPIRONE HYDROCHLORIDE 10 MG: 10 TABLET ORAL at 08:40

## 2019-03-24 RX ADMIN — Medication 10 ML: at 15:18

## 2019-03-24 RX ADMIN — ACETAMINOPHEN 650 MG: 325 TABLET ORAL at 19:17

## 2019-03-24 RX ADMIN — OXYCODONE AND ACETAMINOPHEN 1 TABLET: 5; 325 TABLET ORAL at 08:51

## 2019-03-24 RX ADMIN — BUSPIRONE HYDROCHLORIDE 10 MG: 10 TABLET ORAL at 22:11

## 2019-03-24 RX ADMIN — OXYCODONE AND ACETAMINOPHEN 1 TABLET: 5; 325 TABLET ORAL at 15:18

## 2019-03-24 RX ADMIN — METOPROLOL TARTRATE 25 MG: 25 TABLET ORAL at 22:12

## 2019-03-24 RX ADMIN — Medication 10 ML: at 06:28

## 2019-03-24 RX ADMIN — LORAZEPAM 0.5 MG: 0.5 TABLET ORAL at 22:22

## 2019-03-24 RX ADMIN — DULOXETINE 30 MG: 30 CAPSULE, DELAYED RELEASE ORAL at 17:31

## 2019-03-24 RX ADMIN — ROSUVASTATIN CALCIUM 40 MG: 40 TABLET, FILM COATED ORAL at 22:11

## 2019-03-24 RX ADMIN — OXYCODONE AND ACETAMINOPHEN 1 TABLET: 5; 325 TABLET ORAL at 03:15

## 2019-03-24 RX ADMIN — OXYCODONE AND ACETAMINOPHEN 1 TABLET: 5; 325 TABLET ORAL at 22:22

## 2019-03-24 RX ADMIN — METOPROLOL TARTRATE 25 MG: 25 TABLET ORAL at 08:40

## 2019-03-24 RX ADMIN — FERROUS SULFATE TAB 325 MG (65 MG ELEMENTAL FE) 325 MG: 325 (65 FE) TAB at 08:40

## 2019-03-24 NOTE — PROGRESS NOTES
Bedside and Verbal shift change report given to Tootie Saul (oncoming nurse) by Julius Summers (offgoing nurse). Report included the following information SBAR, Kardex, ED Summary, Procedure Summary, MAR, Accordion, Recent Results, Med Rec Status and Cardiac Rhythm NSR. Problem: Falls - Risk of  Goal: *Absence of Falls  Description  Document Tee Pagan Fall Risk and appropriate interventions in the flowsheet. Outcome: Progressing Towards Goal Note: Fall Risk Interventions:  Medication Interventions: Patient to call before getting OOB, Evaluate medications/consider consulting pharmacy, Teach patient to arise slowly    Elimination Interventions: Call light in reach, Patient to call for help with toileting needs    History of Falls Interventions: Door open when patient unattended, Evaluate medications/consider consulting pharmacy, Room close to nurse's station, Assess for delayed presentation/identification of injury for 48 hrs (comment for end date)    Patient to call for help       1300 Per order from Dr. Richelle Lion, compression stockings order and placed on patient. 1500 Education on orthostasis. Compression stockings, sodium and fluid intake, zero to no caffeine, pre-hydration for especially hot days, taking BP and home and the importance of following up with cardiologist at home. Patient able to return education about importance. Education on getting slowly, walking and balance. Recognizing how she feels when she gets lightheaded or dizzy. 1930 Bedside and Verbal shift change report given to Monika(oncoming nurse) by Tootie Saul (offgoing nurse). Report included the following information SBAR, Kardex, ED Summary, Procedure Summary, MAR, Accordion, Recent Results, Med Rec Status and Cardiac Rhythm NSR.

## 2019-03-24 NOTE — PROGRESS NOTES
Hospitalist Progress Note          Tamara Valentine MD  Please call  and page for questions. Call physician on-call through the  7pm-7am    Daily Progress Note: 3/24/2019    Primary care provider:Rozina Winslow MD    Date of admission: 3/21/2019  9:01 PM    Admission summery and hospital course:  58-year-old white female with past medical history of recent upper GI bleed, gastric, esophageal and duodenal ulcers, syncope, severe peripheral vascular disease, bilateral carotid artery stenosis, obesity, iron deficiency anemia, left ventricular hypertrophy, positional orthostatic tachycardia syndrome (POTS), hypertension, bronchitis, UTI, chronic kidney disease stage III, anxiety, prior sepsis, type 2 diabetes mellitus with peripheral autonomic neuropathy, prior multiple rib fractures, presented to the emergency department accompanied by her daughter and daughter's friends with chief complaint of chest pain.  The patient states \"my daughter brought me here. \"  She notes that she came to the emergency department at the urging of her family/daughter with complaints of residual left-sided chest pain.  She notably underwent a Heimlich maneuver when she was thought to have been choking with a syncopal episode at her grandson's baseball game two days ago. Aubrey Tavera notably has been hospitalized at [facility] secondary to the same. Dior Mary was concern that patient had recently been diagnosed with bilateral carotid artery stenosis with 100% occlusion on the right and 69% occlusion on the left carotid artery, for which she has not undergone any intervention for the same.  She notably has been hospitalized at [facility] from 03/10/2019 to 03/14/2019, with diagnoses including acute GI bleed secondary to esophageal, gastric, and duodenal bleeding ulcers (requiring EGD on 03/13/2019), syncope and collapse, vasovagal with iron deficiency anemia (last recorded hemoglobin was 7.8), left ventricular hypertrophy (2D echocardiogram showing left ventricular ejection fraction 60% with grade 1 diastolic dysfunction), and POTS.  The patient's daughter notes that the patient has not undergone any interventions.  She notes that she was discharged from the hospital. Kesha Alvarez was to follow up at [facility] for another evaluation, questionably by vascular surgeon.  Unfortunately, she had another syncopal event, as mentioned has been hospitalized, but subsequently was discharged.  The patient and her daughter notes that initially the plan was for the patient to be transferred to [facility] from [facility], but as the bed was not available, she was discharged and was to follow up on an outpatient basis per their report. 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  Approximately 50% focal stenosis at the origin of the left internal carotid artery and heavily calcified plaque at the origin of the nondominant right vertebral artery origin of uncertain significance.   She complains of some shortness of breath while at rest.    Labs showed a hemoglobin of 8.9 (compared to 7.8 from lab work at [facility]).  Creatinine equals 1.35 with last creatinine 1.51 at [facility] patient notes that she has known history of stage III chronic kidney disease. ED requested the patient to be admitted to the hospitalist service for continued evaluation and treatment      Subjective:   Patient denied any acute issue. Patient she is afraid to get up due to dizziness.    Assessment/Plan:   Recurrent syncope:  Probably multiple factors including uncontrolled BP, orthostatic hypotension, anemia, and carotid artery stenosis. Continue  PT/OT. Spoke with Dr. Neeru Blunt on 03/23, will get intervention neurology input on 03/23. Patient was suggested for graded prescribed stocking. Patient said she had extensive cardiac work up at her previous hospitalization including tilt table test and cardiac stress test. She was found to have orthostatic hypotension. She needs follow up with her cardiology after discharge.      Hypertension.    Uncontrolled BP. Started metoprolol which is helping her. Will continue at discharge as it is helping her anxiety and heart rate as well. Hydralazine as PRN and monitor.      Bilateral carotid artery stenosis. No focal neurological deficits on exam.    Appreciate vascular surgery input. .      Anemia. Acute on chronic. Due to PUD. H/H has not been trending down now. Appreciate GI input, will continue to monitor H/H. Continue iron supplement. She has had a recent EGD which had revealed esophageal, gastric, and duodenal ulcers.    Continue to hold ASA for now.      Acute chest pain:   Likely musculoskeletal, tenderness on palpation on exam with recent Heimlich maneuver. Has noted significant cardiac risk factors.  Troponin levels are negative.   Continue telemetry.     Shortness of breath.   CXR with clear lung fields. Fracture of the right fifth rib laterally age indeterminant. Questionable fracture of a left mid rib   Supplemental oxygen as needed.       Stage III chronic kidney disease.  Creatinine is stable.      Anxiety.  Patient is anxious, continue her Buspar and Cumbalta, add ativan as PRN.  Metoprolol is helping her.      Type 2 diabetes mellitus.  Hold metformin now and monitor with SSI.      PVD      See orders for other plans. VTE prophylaxis: SCD  Code status: Full  Discussed plan of care with Patient/Family and Nurse. Pre-admission lived at home. Discharge planning: Continue tele for now. Possible discharge tomorrow if not intervention needed. Review of Systems:     Review of Systems:  Symptom  Y/N  Comments   Symptom  Y/N  Comments    Fever/Chills   n   Chest Pain  y    Poor Appetite   n   Edema  n     Cough  n   Abdominal Pain  n     Sputum  n   Joint Pain      SOB/AMIN  n   Pruritis/Rash      Nausea/vomit  n   Tolerating PT/OT      Diarrhea     Tolerating Diet      Constipation     Other      Could not obtain due to:         Objective:   Physical Exam:     Visit Vitals  /74 (BP 1 Location: Right arm, BP Patient Position: At rest)   Pulse 71   Temp 98 °F (36.7 °C)   Resp 16   Ht 5' 7\" (1.702 m)   Wt 92.2 kg (203 lb 4.2 oz)   LMP 2009   SpO2 100%   BMI 31.84 kg/m²      O2 Device: Room air    Temp (24hrs), Av.8 °F (36.6 °C), Min:95.5 °F (35.3 °C), Max:98.5 °F (36.9 °C)     07 -  1900  In: 360 [P.O.:360]  Out: 600 [Urine:600]    190 -  0700  In: 2010 [P.O.:2010]  Out: 2000 [Urine:2000]  General:  Alert, cooperative, no distress, appears stated age. Lungs:   Clear to auscultation bilaterally. Chest wall:  Tenderness at the right lower aspect.    Heart:  Regular rate and rhythm, S1, S2 normal, no murmur.    Abdomen:   Soft, Tender at the epigastric area. Bowel sounds normal.    Extremities: Extremities normal, atraumatic, no cyanosis or edema. Skin: Skin color, texture, turgor normal. No rashes or lesions   Neurologic: CNII-XII intact.         Data Review:       Recent Days:  Recent Labs     19  2205 19  1010 19  0126  19  0220 19  2041   WBC  --   --  3.3*  --  4.1 6.6   HGB 8.9* 8.5* 8.8*   < > 7.9* 8.9*   HCT 30.1* 29.4* 30.0*   < > 27.0* 30.4*   PLT  --   --  216  --  185 238    < > = values in this interval not displayed.      Recent Labs     19  0126 19  0220 19  2041   * 135* 134*   K 4.2 4.1 4.8    103 102   CO2 30 22 22   * 96 98   BUN 12 13 16   CREA 1.21* 1.13* 1.35*   CA 8.5 7.9* 8.3*   ALB 2.2*  --  2.4*   SGOT 21  --  22   ALT 18  --  20     No results for input(s): PH, PCO2, PO2, HCO3, FIO2 in the last 72 hours.     24 Hour Results:  Recent Results (from the past 24 hour(s))   GLUCOSE, POC    Collection Time: 03/23/19  4:32 PM   Result Value Ref Range    Glucose (POC) 132 (H) 65 - 100 mg/dL    Performed by Scotty WILKS (CON)    GLUCOSE, POC    Collection Time: 03/23/19  9:56 PM   Result Value Ref Range    Glucose (POC) 155 (H) 65 - 100 mg/dL    Performed by Sarah Bethah Level    HGB & HCT    Collection Time: 03/23/19 10:05 PM   Result Value Ref Range    HGB 8.9 (L) 11.5 - 16.0 g/dL    HCT 30.1 (L) 35.0 - 47.0 %   GLUCOSE, POC    Collection Time: 03/24/19  6:31 AM   Result Value Ref Range    Glucose (POC) 136 (H) 65 - 100 mg/dL    Performed by Alvah Level    GLUCOSE, POC    Collection Time: 03/24/19 11:59 AM   Result Value Ref Range    Glucose (POC) 137 (H) 65 - 100 mg/dL    Performed by Gloria Soler  PCT        Problem List:  Problem List as of 3/24/2019 Date Reviewed: 3/21/2019          Codes Class Noted - Resolved    Sinus tachycardia ICD-10-CM: R00.0  ICD-9-CM: 427.89  3/21/2019 - Present        SOB (shortness of breath) ICD-10-CM: R06.02  ICD-9-CM: 786.05  3/21/2019 - Present        Acute chest pain ICD-10-CM: R07.9  ICD-9-CM: 786.50  3/21/2019 - Present        * (Principal) Bilateral carotid artery stenosis ICD-10-CM: I65.23  ICD-9-CM: 433.10, 433.30  3/21/2019 - Present        DM [facility]diabetes mellitus) [facility] ICD-10-CM: E11.9  ICD-9-CM: 250.00  1/28/2010 - Present        HTN (hypertension) ICD-10-CM: I10  ICD-9-CM: 401.9  1/28/2010 - Present        Hyperlipidemia ICD-10-CM: E78.5  ICD-9-CM: 272.4  1/28/2010 - Present        Anxiety ICD-10-CM: F41.9  ICD-9-CM: 300.00  1/28/2010 - Present              Medications reviewed  Current Facility-Administered Medications   Medication Dose Route Frequency    hydrALAZINE (APRESOLINE) 20 mg/mL injection 20 mg  20 mg IntraVENous Q6H PRN    metoprolol tartrate (LOPRESSOR) tablet 25 mg  25 mg Oral Q12H    busPIRone (BUSPAR) tablet 10 mg  10 mg Oral TID    DULoxetine (CYMBALTA) capsule 30 mg  30 mg Oral BID    ferrous sulfate tablet 325 mg  1 Tab Oral BID WITH MEALS    melatonin tablet 3 mg  3 mg Oral QHS PRN    pantoprazole (PROTONIX) tablet 40 mg  40 mg Oral BID    polyethylene glycol (MIRALAX) packet 17 g  17 g Oral DAILY    rosuvastatin (CRESTOR) tablet 40 mg  40 mg Oral QHS    acetaminophen (TYLENOL) tablet 650 mg  650 mg Oral Q4H PRN    oxyCODONE-acetaminophen (PERCOCET) 5-325 mg per tablet 1 Tab  1 Tab Oral Q6H PRN    ondansetron (ZOFRAN) injection 4 mg  4 mg IntraVENous Q6H PRN    LORazepam (ATIVAN) tablet 0.5 mg  0.5 mg Oral Q6H PRN    dextrose (D50W) injection syrg 12.5-25 g  12.5-25 g IntraVENous PRN    sodium chloride (NS) flush 5-40 mL  5-40 mL IntraVENous Q8H    sodium chloride (NS) flush 5-40 mL  5-40 mL IntraVENous PRN    glucose chewable tablet 16 g  4 Tab Oral PRN    dextrose (D50W) injection syrg 12.5-25 g  25-50 mL IntraVENous PRN    glucagon (GLUCAGEN) injection 1 mg  1 mg IntraMUSCular PRN    insulin lispro (HUMALOG) injection   SubCUTAneous AC&HS       Care Plan discussed with: Patient/Family and Nurse    Total time spent with patient: 30 minutes.     Katerin Dawson MD

## 2019-03-24 NOTE — PROGRESS NOTES
0730: Bedside and Verbal shift change report given to Lee Venegas (oncoming nurse) by Josefa Logan (offgoing nurse). Report included the following information SBAR, Kardex, ED Summary, Intake/Output, MAR, Recent Results and Cardiac Rhythm NSR. Problem: Falls - Risk of  Goal: *Absence of Falls  Description  Document Shwetha Quick Fall Risk and appropriate interventions in the flowsheet. Outcome: Progressing Towards Goal  Note:   Fall Risk Interventions:     Medication Interventions: Patient to call before getting OOB, Evaluate medications/consider consulting pharmacy, Teach patient to arise slowly    Elimination Interventions: Call light in reach, Patient to call for help with toileting needs    History of Falls Interventions: Door open when patient unattended, Evaluate medications/consider consulting pharmacy, Room close to nurse's station, Assess for delayed presentation/identification of injury for 48 hrs (comment for end date)    Up with standby assistance for history of falls. Call bell and personal effects within reach. Bed locked and in low position. Non skid footwear in place. Problem: Patient Education: Go to Patient Education Activity  Goal: Patient/Family Education  Outcome: Progressing Towards Goal     Problem: Syncope  Goal: *Absence of injury  Outcome: Progressing Towards Goal  Note: To call for assistance before getting out of bed  Goal: Decrease or eliminate episodes of syncope  Outcome: Progressing Towards Goal     Problem: Pressure Injury - Risk of  Goal: *Prevention of pressure injury  Description  Document Torrey Scale and appropriate interventions in the flowsheet. Outcome: Progressing Towards Goal  Note:   Pressure Injury Interventions:      Activity Interventions: Increase time out of bed, Pressure redistribution bed/mattress(bed type)     Turns self at appropriate intervals; skin assessed Q4H; blood glucose controlled

## 2019-03-24 NOTE — PROGRESS NOTES
Occupational Therapy EVALUATION/discharge  Patient: Rosario Montalvo (74 y.o. female)  Date: 3/24/2019  Primary Diagnosis: Acute chest pain [R07.9]  Bilateral carotid artery stenosis [I65.23]  SOB (shortness of breath) [R06.02]  Sinus tachycardia [R00.0]       Precautions: fall      ASSESSMENT:   Based on the objective data described below, the patient presents at functional baseline, independent with ADLs and mobility. Patient reports history of frequent falls due to syncope/ orthostatic hypotension. Patient receptive to education on increasing time with postural changes, seated leg/ ankle pumps prior to standing, benefit of compression stockings, and importance of monitoring symptoms while standing/ sitting if needed. Patient demonstrated significant BP decrease with mobility (see vitals below), reported feeling \"clammy\" after walking ~200 ft and initiated return to sitting. Further skilled acute occupational therapy is not indicated at this time. Discharge Recommendations: Home with no f/u OT needs pending BP management   Further Equipment Recommendations for Discharge: none     SUBJECTIVE:   Patient stated I feel clammy now. I think I need to sit down.     OBJECTIVE DATA SUMMARY:   HISTORY:   Past Medical History:   Diagnosis Date    Anxiety 1/28/2010    DM (diabetes mellitus) ([address] 1/28/2010    HTN (hypertension) 1/28/2010    Hyperlipidemia 1/28/2010   No past surgical history on file. Prior Level of Function/Environment/Context: independent with ADLs/ IADLs, ambulatory with no AD, living alone although has supportive neighbors who check on her regularly. Patient measures her BP regularly at home.   Expanded or extensive additional review of patient history:     Home Situation  Home Environment: Trailer/mobile home  Wheelchair Ramp: Yes  One/Two Story Residence: One story  Living Alone: Yes  Support Systems: Family member(s)  Patient Expects to be Discharged to[de-identified] Private residence  Current DME Used/Available at Home: None    EXAMINATION OF PERFORMANCE DEFICITS:  Cognitive/Behavioral Status:  Neurologic State: Alert  Orientation Level: Oriented X4  Cognition: Appropriate decision making; Appropriate for age attention/concentration; Appropriate safety awareness; Follows commands  Perception: Appears intact  Perseveration: No perseveration noted  Safety/Judgement: Awareness of environment; Insight into deficits; Fall prevention    Skin: visible skin appears intact    Edema: none noted    Hearing: Auditory  Auditory Impairment: None    Vision/Perceptual:                           Acuity: Within Defined Limits         Range of Motion:    AROM: Within functional limits                         Strength:    Strength: Within functional limits                Coordination:  Coordination: Within functional limits  Fine Motor Skills-Upper: Left Intact; Right Intact    Gross Motor Skills-Upper: Left Intact; Right Intact    Tone & Sensation:    Tone: Normal  Sensation: Impaired(foot neuropathy)                      Balance:  Sitting: Intact  Standing: Intact    Functional Mobility and Transfers for ADLs:  Bed Mobility:  Supine to Sit: Independent    Transfers:  Sit to Stand: Independent  Stand to Sit: Independent  Toilet Transfer : Independent(inferred)    ADL Assessment:  Feeding: Independent(inferred)    Oral Facial Hygiene/Grooming: Independent(inferred)    Bathing: Independent(inferred)    Upper Body Dressing: Independent(inferred)    Lower Body Dressing: Independent(doffed/ donned socks and donned compression stockings)    Toileting: Independent(inferred)                ADL Intervention and task modifications:     Cognitive Retraining  Safety/Judgement: Awareness of environment; Insight into deficits; Fall prevention    Functional Measure:  Barthel Index:    Bathin  Bladder: 10  Bowels: 10  Groomin  Dressing: 10  Feeding: 10  Mobility: 15  Stairs: 10  Toilet Use: 10  Transfer (Bed to Chair and Back): 15  Total: 100/100        Percentage of impairment   0%   1-19%   20-39%   40-59%   60-79%   80-99%   100%   Barthel Score 0-100 100 99-80 79-60 59-40 20-39 1-19   0     The Barthel ADL Index: Guidelines  1. The index should be used as a record of what a patient does, not as a record of what a patient could do. 2. The main aim is to establish degree of independence from any help, physical or verbal, however minor and for whatever reason. 3. The need for supervision renders the patient not independent. 4. A patient's performance should be established using the best available evidence. Asking the patient, friends/relatives and nurses are the usual sources, but direct observation and common sense are also important. However direct testing is not needed. 5. Usually the patient's performance over the preceding 24-48 hours is important, but occasionally longer periods will be relevant. 6. Middle categories imply that the patient supplies over 50 per cent of the effort. 7. Use of aids to be independent is allowed. Isidro Barnes., Barthel, D.W. (6461). Functional evaluation: the Barthel Index. [facility] (14)2. Cheri Pierce sammy ALFREDO Ward, Armand Sanabria., Malorie Siu., [place], [address] [place] [address] (1999). Measuring the change indisability after inpatient rehabilitation; comparison of the responsiveness of the Barthel Index and Functional [place] Measure. Journal of Neurology, Neurosurgery, and Psychiatry, 66(4), 206-738. Silvana Manuel, N.J.A, ROMAN Brown, & Trav Verduzco MLaviniaA. (2004.) Assessment of post-stroke quality of life in cost-effectiveness studies: The usefulness of the Barthel Index and the EuroQoL-5D.  Quality of Life Research, 15, 145-35         Occupational Therapy Evaluation Charge Determination   History Examination Decision-Making   LOW Complexity : Brief history review  LOW Complexity : 1-3 performance deficits relating to physical, cognitive , or psychosocial skils that result in activity limitations and / or participation restrictions  MEDIUM Complexity : Patient may present with comorbidities that affect occupational performnce. Miniml to moderate modification of tasks or assistance (eg, physical or verbal ) with assesment(s) is necessary to enable patient to complete evaluation       Based on the above components, the patient evaluation is determined to be of the following complexity level: LOW   Pain:  Pain Scale 1: Numeric (0 - 10)  Pain Intensity 1: 0  Pain Location 1: Chest  Pain Orientation 1: Anterior  Pain Description 1: Aching  Pain Intervention(s) 1: Medication (see MAR)  Activity Tolerance:   Vitals:    03/24/19 1247 03/24/19 1250 03/24/19 1256 03/24/19 1258   BP: 163/79 125/74 94/56 179/79   BP 1 Location: Left arm Right arm Right arm Right arm   BP Patient Position: During activity; Sitting Standing;During activity Standing;During activity  Comment: after walking ~200 ft Sitting;During activity   Pulse: 83 75 85 76     Please refer to the flowsheet for vital signs taken during this treatment. After treatment:   [x]  Patient left in no apparent distress sitting up in chair  []  Patient left in no apparent distress in bed  [x]  Call bell left within reach  [x]  Nursing notified  []  Caregiver present  [x]  Chair alarm activated    COMMUNICATION/EDUCATION:   Communication/Collaboration:  [x]      Home safety education was provided and the patient/caregiver indicated understanding. [x]      Patient/family have participated as able and agree with findings and recommendations. []      Patient is unable to participate in plan of care at this time.   Findings and recommendations were discussed with: Registered Nurse    Sirisha Leonardo OT  Time Calculation: 41 mins

## 2019-03-25 LAB
ALBUMIN SERPL-MCNC: 2.3 G/DL (ref 3.5–5)
ALBUMIN/GLOB SERPL: 0.5 {RATIO} (ref 1.1–2.2)
ALP SERPL-CCNC: 96 U/L (ref 45–117)
ALT SERPL-CCNC: 17 U/L (ref 12–78)
ANION GAP SERPL CALC-SCNC: 5 MMOL/L (ref 5–15)
AST SERPL-CCNC: 24 U/L (ref 15–37)
BASOPHILS # BLD: 0 K/UL (ref 0–0.1)
BASOPHILS NFR BLD: 0 % (ref 0–1)
BILIRUB SERPL-MCNC: 0.2 MG/DL (ref 0.2–1)
BUN SERPL-MCNC: 11 MG/DL (ref 6–20)
BUN/CREAT SERPL: 8 (ref 12–20)
CALCIUM SERPL-MCNC: 8.5 MG/DL (ref 8.5–10.1)
CHLORIDE SERPL-SCNC: 106 MMOL/L (ref 97–108)
CO2 SERPL-SCNC: 26 MMOL/L (ref 21–32)
CREAT SERPL-MCNC: 1.44 MG/DL (ref 0.55–1.02)
DIFFERENTIAL METHOD BLD: ABNORMAL
EOSINOPHIL # BLD: 0.2 K/UL (ref 0–0.4)
EOSINOPHIL NFR BLD: 4 % (ref 0–7)
ERYTHROCYTE [DISTWIDTH] IN BLOOD BY AUTOMATED COUNT: 17.3 % (ref 11.5–14.5)
GLOBULIN SER CALC-MCNC: 4.5 G/DL (ref 2–4)
GLUCOSE BLD STRIP.AUTO-MCNC: 143 MG/DL (ref 65–100)
GLUCOSE BLD STRIP.AUTO-MCNC: 147 MG/DL (ref 65–100)
GLUCOSE BLD STRIP.AUTO-MCNC: 157 MG/DL (ref 65–100)
GLUCOSE BLD STRIP.AUTO-MCNC: 172 MG/DL (ref 65–100)
GLUCOSE SERPL-MCNC: 134 MG/DL (ref 65–100)
HCT VFR BLD AUTO: 33.8 % (ref 35–47)
HGB BLD-MCNC: 10.2 G/DL (ref 11.5–16)
IMM GRANULOCYTES # BLD AUTO: 0 K/UL (ref 0–0.04)
IMM GRANULOCYTES NFR BLD AUTO: 0 % (ref 0–0.5)
LEFT CCA DIST DIAS: 21.5 CM/S
LEFT CCA DIST SYS: 57.8 CM/S
LEFT CCA PROX DIAS: 18.9 CM/S
LEFT CCA PROX SYS: 74.5 CM/S
LEFT ECA DIAS: 11.13 CM/S
LEFT ECA SYS: 92.7 CM/S
LEFT ICA DIST DIAS: 47.9 CM/S
LEFT ICA DIST SYS: 124.5 CM/S
LEFT ICA MID DIAS: 48.5 CM/S
LEFT ICA MID SYS: 111.6 CM/S
LEFT ICA PROX DIAS: 59.5 CM/S
LEFT ICA PROX SYS: 173.3 CM/S
LEFT ICA/CCA SYS: 3
LEFT VERTEBRAL DIAS: 39.74 CM/S
LEFT VERTEBRAL SYS: 89.8 CM/S
LYMPHOCYTES # BLD: 1.5 K/UL (ref 0.8–3.5)
LYMPHOCYTES NFR BLD: 31 % (ref 12–49)
MCH RBC QN AUTO: 33.8 PG (ref 26–34)
MCHC RBC AUTO-ENTMCNC: 30.2 G/DL (ref 30–36.5)
MCV RBC AUTO: 111.9 FL (ref 80–99)
MONOCYTES # BLD: 0.4 K/UL (ref 0–1)
MONOCYTES NFR BLD: 9 % (ref 5–13)
NEUTS SEG # BLD: 2.7 K/UL (ref 1.8–8)
NEUTS SEG NFR BLD: 56 % (ref 32–75)
NRBC # BLD: 0 K/UL (ref 0–0.01)
NRBC BLD-RTO: 0 PER 100 WBC
PLATELET # BLD AUTO: 219 K/UL (ref 150–400)
PMV BLD AUTO: 10.1 FL (ref 8.9–12.9)
POTASSIUM SERPL-SCNC: 4 MMOL/L (ref 3.5–5.1)
PROT SERPL-MCNC: 6.8 G/DL (ref 6.4–8.2)
RBC # BLD AUTO: 3.02 M/UL (ref 3.8–5.2)
RBC MORPH BLD: ABNORMAL
RIGHT CCA DIST DIAS: 6.1 CM/S
RIGHT CCA DIST SYS: 29.6 CM/S
RIGHT CCA PROX DIAS: 0 CM/S
RIGHT CCA PROX SYS: 45.3 CM/S
RIGHT ECA DIAS: 20.9 CM/S
RIGHT ECA SYS: 162.8 CM/S
RIGHT ICA PROX DIAS: 0 CM/S
RIGHT ICA PROX SYS: 0 CM/S
RIGHT ICA/CCA SYS: 0
RIGHT VERTEBRAL DIAS: 12.53 CM/S
RIGHT VERTEBRAL SYS: 29.6 CM/S
SERVICE CMNT-IMP: ABNORMAL
SODIUM SERPL-SCNC: 137 MMOL/L (ref 136–145)
WBC # BLD AUTO: 4.8 K/UL (ref 3.6–11)

## 2019-03-25 PROCEDURE — 36415 COLL VENOUS BLD VENIPUNCTURE: CPT

## 2019-03-25 PROCEDURE — 74011250637 HC RX REV CODE- 250/637: Performed by: FAMILY MEDICINE

## 2019-03-25 PROCEDURE — 74011636637 HC RX REV CODE- 636/637: Performed by: FAMILY MEDICINE

## 2019-03-25 PROCEDURE — 65660000000 HC RM CCU STEPDOWN

## 2019-03-25 PROCEDURE — 80053 COMPREHEN METABOLIC PANEL: CPT

## 2019-03-25 PROCEDURE — 82962 GLUCOSE BLOOD TEST: CPT

## 2019-03-25 PROCEDURE — 74011250637 HC RX REV CODE- 250/637: Performed by: NURSE PRACTITIONER

## 2019-03-25 PROCEDURE — 85025 COMPLETE CBC W/AUTO DIFF WBC: CPT

## 2019-03-25 PROCEDURE — 74011250636 HC RX REV CODE- 250/636: Performed by: INTERNAL MEDICINE

## 2019-03-25 RX ORDER — GUAIFENESIN 100 MG/5ML
81 LIQUID (ML) ORAL DAILY
Status: DISCONTINUED | OUTPATIENT
Start: 2019-03-26 | End: 2019-03-26 | Stop reason: HOSPADM

## 2019-03-25 RX ORDER — MIDODRINE HYDROCHLORIDE 5 MG/1
2.5 TABLET ORAL EVERY 8 HOURS
Status: DISCONTINUED | OUTPATIENT
Start: 2019-03-25 | End: 2019-03-26 | Stop reason: HOSPADM

## 2019-03-25 RX ORDER — SODIUM CHLORIDE 9 MG/ML
75 INJECTION, SOLUTION INTRAVENOUS CONTINUOUS
Status: DISCONTINUED | OUTPATIENT
Start: 2019-03-25 | End: 2019-03-26 | Stop reason: HOSPADM

## 2019-03-25 RX ADMIN — FERROUS SULFATE TAB 325 MG (65 MG ELEMENTAL FE) 325 MG: 325 (65 FE) TAB at 17:27

## 2019-03-25 RX ADMIN — BUSPIRONE HYDROCHLORIDE 10 MG: 10 TABLET ORAL at 20:50

## 2019-03-25 RX ADMIN — PANTOPRAZOLE SODIUM 40 MG: 40 TABLET, DELAYED RELEASE ORAL at 09:17

## 2019-03-25 RX ADMIN — SODIUM CHLORIDE 75 ML/HR: 900 INJECTION, SOLUTION INTRAVENOUS at 09:19

## 2019-03-25 RX ADMIN — INSULIN LISPRO 2 UNITS: 100 INJECTION, SOLUTION INTRAVENOUS; SUBCUTANEOUS at 12:08

## 2019-03-25 RX ADMIN — DULOXETINE 30 MG: 30 CAPSULE, DELAYED RELEASE ORAL at 09:17

## 2019-03-25 RX ADMIN — INSULIN LISPRO 2 UNITS: 100 INJECTION, SOLUTION INTRAVENOUS; SUBCUTANEOUS at 07:05

## 2019-03-25 RX ADMIN — Medication 10 ML: at 09:20

## 2019-03-25 RX ADMIN — BUSPIRONE HYDROCHLORIDE 10 MG: 10 TABLET ORAL at 09:18

## 2019-03-25 RX ADMIN — METOPROLOL TARTRATE 25 MG: 25 TABLET ORAL at 20:49

## 2019-03-25 RX ADMIN — OXYCODONE AND ACETAMINOPHEN 1 TABLET: 5; 325 TABLET ORAL at 04:38

## 2019-03-25 RX ADMIN — INSULIN LISPRO 2 UNITS: 100 INJECTION, SOLUTION INTRAVENOUS; SUBCUTANEOUS at 17:27

## 2019-03-25 RX ADMIN — ACETAMINOPHEN 650 MG: 325 TABLET ORAL at 16:05

## 2019-03-25 RX ADMIN — ROSUVASTATIN CALCIUM 40 MG: 40 TABLET, FILM COATED ORAL at 20:50

## 2019-03-25 RX ADMIN — DULOXETINE 30 MG: 30 CAPSULE, DELAYED RELEASE ORAL at 17:27

## 2019-03-25 RX ADMIN — Medication 10 ML: at 07:05

## 2019-03-25 RX ADMIN — OXYCODONE AND ACETAMINOPHEN 1 TABLET: 5; 325 TABLET ORAL at 12:08

## 2019-03-25 RX ADMIN — BUSPIRONE HYDROCHLORIDE 10 MG: 10 TABLET ORAL at 16:05

## 2019-03-25 RX ADMIN — FERROUS SULFATE TAB 325 MG (65 MG ELEMENTAL FE) 325 MG: 325 (65 FE) TAB at 09:18

## 2019-03-25 RX ADMIN — Medication 10 ML: at 20:54

## 2019-03-25 RX ADMIN — LORAZEPAM 0.5 MG: 0.5 TABLET ORAL at 21:05

## 2019-03-25 RX ADMIN — METOPROLOL TARTRATE 25 MG: 25 TABLET ORAL at 09:17

## 2019-03-25 RX ADMIN — PANTOPRAZOLE SODIUM 40 MG: 40 TABLET, DELAYED RELEASE ORAL at 17:27

## 2019-03-25 RX ADMIN — POLYETHYLENE GLYCOL 3350 17 G: 17 POWDER, FOR SOLUTION ORAL at 09:17

## 2019-03-25 RX ADMIN — MIDODRINE HYDROCHLORIDE 2.5 MG: 5 TABLET ORAL at 20:50

## 2019-03-25 RX ADMIN — MIDODRINE HYDROCHLORIDE 2.5 MG: 5 TABLET ORAL at 17:27

## 2019-03-25 RX ADMIN — OXYCODONE AND ACETAMINOPHEN 1 TABLET: 5; 325 TABLET ORAL at 21:05

## 2019-03-25 NOTE — PROGRESS NOTES
GI note:    Hgb stable without signs of active bleeding. OK to resume ASA. Continue PPI. Will sign off. Please call with any questions.     Dr. Hill Urban

## 2019-03-25 NOTE — CONSULTS
Cardiology Consult Note      Patient Name: Aga Church  : 1960 MRN: [id number]  Date: 3/25/2019  Time: 4:54 PM    Admit Diagnosis: Acute chest pain [R07.9]  Bilateral carotid artery stenosis [I65.23]  SOB (shortness of breath) [R06.02]  Sinus tachycardia [R00.0]    Primary Cardiologist: Dr. Diana Burt MD    Consulting Cardiologist: Wilfrido Lawton III, M.D.    Reason for Consult: POTS, recurrent syncope, orthostatic hypotension     Requesting MD: Dr. Chantale Redman MD    HPI:  Aga Church is a 60 y.o. female admitted on 3/21/2019 for chest pain, recent syncope, carotid artery stenosis, SOB. Has PMH of POTS, Carotid artery disease, syncope, NSTEMI,  CKD stage III, JC, diastolic dysfunction, PVD, type II DM with peripheral autonomic neuropathy, rib fractures. Admitted d/t syncopal episode last week 3/19/19. She was recently hospitalized at [facility] from 3/10/2019-3/14/2019 with GIB d/t gastric, esophogeal and duodenal ulcers, syncope, vasovagal with iron deficiency anemia, left ventricular hypertrophy with ECHO showing EF of 60% with grade 1 diastolic dysfunction, and was diagnosed with POTS. During that hospitalization she reports she had stress test and echo which were normal.  Has no hx of cardiac cath. On 3/19/19 she had episode where she collapsed and per pt report daughter said she turned blue. It was thought she was choking so heimlich performed and CPR but by time paramedics arrived she had awakened. She denies any pre-event dizziness or recollection of choking. She was then evaluated at [facility]. Had CTA which showed her known Rt % stenosis and Left 50% ICA stenosis. She was to follow up with a vascular surgeron and was discharged the next day. She subsequently presented to ER on 3/21 with c/o chest pain which workup indicated was musculoskelatal in nature.   Additionally, she came to [address] [place] [address] ER as family felt that she needed additional evaluation for her carotid arteries and syncope. Has been seen by vascular and no surgical procedure planned for carotid disease. Neurointerventional also consulted and recommends BP management to avoid fluctuations as her fluctuating blood pressure is causing hypoperfusion and subsequent syncopal episodes. Cardiology consulted for POTS management, orthostatic hypotension. Subjective:  Denies chest pain, dizziness. Reports that she has dizziness with standing up. No further syncope since hospitalization. Reports that PTA she hadnot takent he prescribed midodrine consistently d/t repeat hospitalization and she thought she was to take midodrine prn when BP low. States she was then taking an antihypertensive when her BP would go too high. Assessment and Plan     1. Hx of POTS   -Restart home midodrine 2.5 mg every 8 hours ATC   -ok to continue Lopressor.    -Stop PRN hydralazine   -Anibal hose   -Has already had extensive testing (tilt table included). -While BP is labile, Will need to tolerate higher BP d/t marked fluctuations in BP. 2.  Hx of Carotid artery stenosis   -No focal deficitys   -Vascular surgery and Interventional neurology following.- no interventions planned. 3. Anemia:  hgb 7.9 on admission. Now 10.2   -May also have contributed to orthostasis/dizziness   -Hx of recent GI bleed- no active bleeding now. 4. Type II DM     Pt with recurrent syncope, recently diagnosed with POTS. Recommends restart home midodrine ATC- will need to tolerate higher BP to avoid hypotension/orthostasis. Trouble with orthostatic hypotension for several years, worse over last few months with recurrent syncope. Has known diabetic neuropathy and autonomic neuropathy as well. Chest clear, cv rrr no murmur, ext no edema. Long discussion with patient about difficulty in treating this issues.  Stress the importance of hydration, support hose and possibly avoidance of certain meds going forward(buspar, cymbalta). Beta blocker is reasonable, least impact on orthostasis. Need to avoid vasodilators and she should be on schedule midodrine. She says always feels generally better when bp is on the high side, and given carotid disease this is no surprise. She understands that may need to accept higher than ideal supine bp to avoid further syncope. Review of Symptoms:  Constitutional: Negative for fever, chills,  HEENT: Negative for nosebleeds. Respiratory: Negative for cough  Cardiovascular: Positive for chest pain, palpitations, orthopnea, leg swelling, syncope, and PND. Gastrointestinal: Negative for nausea, vomiting, diarrhea, blood in stool and melena, abdominal pain   Genitourinary: Negative for dysuria, and hematuria. Musculoskeletal: Negative for myalgias  Skin: Negative for rash. Heme: Does not bleed or bruise easily. Neurological: Negative for speech changes and focal weakness      Previous treatment/evaluation includes echocardiogram and stress test, tilt table test .  Cardiac risk factors: smoking/ tobacco exposure, diabetes mellitus, obesity, sedentary life style, hypertension, post-menopausal.    Past Medical History:   Diagnosis Date    Anxiety 1/28/2010    DM (diabetes mellitus) ([facility] [address] 1/28/2010    HTN (hypertension) 1/28/2010    Hyperlipidemia 1/28/2010     No past surgical history on file.   Current Facility-Administered Medications   Medication Dose Route Frequency    0.9% sodium chloride infusion  75 mL/hr IntraVENous CONTINUOUS    [START ON 3/26/2019] aspirin chewable tablet 81 mg  81 mg Oral DAILY    hydrALAZINE (APRESOLINE) 20 mg/mL injection 20 mg  20 mg IntraVENous Q6H PRN    metoprolol tartrate (LOPRESSOR) tablet 25 mg  25 mg Oral Q12H    busPIRone (BUSPAR) tablet 10 mg  10 mg Oral TID    DULoxetine (CYMBALTA) capsule 30 mg  30 mg Oral BID    ferrous sulfate tablet 325 mg  1 Tab Oral BID WITH MEALS    melatonin tablet 3 mg  3 mg Oral QHS PRN    pantoprazole (PROTONIX) tablet 40 mg  40 mg Oral BID    polyethylene glycol (MIRALAX) packet 17 g  17 g Oral DAILY    rosuvastatin (CRESTOR) tablet 40 mg  40 mg Oral QHS    acetaminophen (TYLENOL) tablet 650 mg  650 mg Oral Q4H PRN    oxyCODONE-acetaminophen (PERCOCET) 5-325 mg per tablet 1 Tab  1 Tab Oral Q6H PRN    ondansetron (ZOFRAN) injection 4 mg  4 mg IntraVENous Q6H PRN    LORazepam (ATIVAN) tablet 0.5 mg  0.5 mg Oral Q6H PRN    dextrose (D50W) injection syrg 12.5-25 g  12.5-25 g IntraVENous PRN    sodium chloride (NS) flush 5-40 mL  5-40 mL IntraVENous Q8H    sodium chloride (NS) flush 5-40 mL  5-40 mL IntraVENous PRN    glucose chewable tablet 16 g  4 Tab Oral PRN    dextrose (D50W) injection syrg 12.5-25 g  25-50 mL IntraVENous PRN    glucagon (GLUCAGEN) injection 1 mg  1 mg IntraMUSCular PRN    insulin lispro (HUMALOG) injection   SubCUTAneous AC&HS       Allergies   Allergen Reactions    Gabapentin Anaphylaxis     Swelling and rash    Ace Inhibitors Unknown (comments)    Shell-[place] Plus Other (comments)     Increased blood pressure 3/21/19- pt reports no allergy      Anexsia [Hydrocodone-Acetaminophen] Unknown (comments)     Pt reports no allergy      Pepto-Bismol [Bismuth Subsalicylate] Other (comments)     Tongue color change        Family History   Problem Relation Age of Onset    Heart Disease Mother     Cancer Father       Social History     Socioeconomic History    Marital status: LEGALLY      Spouse name: Not on file    Number of children: Not on file    Years of education: Not on file    Highest education level: Not on file   Tobacco Use    Smoking status: Former Smoker   Substance and Sexual Activity    Alcohol use:  Yes    Drug use: No    Sexual activity: Never       Objective:    Physical Exam    Vitals:   Vitals:    03/25/19 1142 03/25/19 1144 03/25/19 1146 03/25/19 1551   BP: 168/67 96/68 (!) 84/43 176/85   Pulse: 72 71   Resp: 18   18   Temp: 98.5 °F (36.9 °C)   98.2 °F (36.8 °C)   SpO2: 94%   99%   Weight:       Height:           General:    Alert, cooperative, no distress, appears stated age. Neck:   Supple,    Back:     Symmetric, . Lungs:     Clear  to auscultation bilaterally. Heart[de-identified]    Regular rate and rhythm, S1, S2 normal, no murmur, click, rub or gallop. Abdomen:     Soft, non-tender. Bowel sounds normal. No masses,  No      organomegaly. Extremities:   Extremities normal, atraumatic, no cyanosis or edema. Vascular:   Pulses - 2+   Skin:   Skin color normal. No rashes or lesions   Neurologic:   CN II-XII grossly intact. WHITEHEAD       Telemetry: NSR, no ectopy    ECG: NSR, no ischemic changes    Data Review:     Radiology:     No results for input(s): CPK, TROIQ in the last 72 hours. No lab exists for component: CKQMB, CPKMB, BMPP  Recent Labs     03/25/19 0444 03/23/19 0126    135*   K 4.0 4.2    101   CO2 26 30   BUN 11 12   CREA 1.44* 1.21*   * 152*   CA 8.5 8.5     Recent Labs     03/25/19 0444 03/23/19  2205  03/23/19 0126   WBC 4.8  --   --  3.3*   HGB 10.2* 8.9*   < > 8.8*   HCT 33.8* 30.1*   < > 30.0*     --   --  216    < > = values in this interval not displayed. Recent Labs     03/25/19 0444 03/23/19  0126   SGOT 24 21   AP 96 81     No results for input(s): CHOL, LDLC in the last 72 hours. No lab exists for component: TGL, HDLC,  HBA1C  No results for input(s): CRP, TSH, TSHEXT in the last 72 hours. No lab exists for component: ESR    Thank you very much for this referral. I appreciate the opportunity to participate in this patient's care. I will follow along with above stated plan. Cruz March. FRANTZ Gomez         [facility] [address] [place] [address] [address]     [place]     [phone]    CC: Other, Phys, MD

## 2019-03-25 NOTE — CONSULTS
Neurointerventional Surgery Consult  Susann Collet, [facility]  Neurocritical Care NP  [phone]    Patient: Eddi Adam MRN: [id number]  SSN: [id number]    YOB: 1960  Age: 60 y.o. Sex: female        Chief Complaint: Syncope    Subjective:      Eddi Adam is a 60 y.o. female who is being seen for evaluation of recurrent syncopal episodes with CTA showing occlusion or near occlusion of the right carotid at the bifurcation. She has multiple medical problems that include recent upper GI bleed secondary to gastric, esophageal, and duodenal ulcers, syncope, severe peripheral vascular disease, carotid artery stenosis, obesity, iron deficiency anemia, left ventricular hypertrophy, positional orthostatic tachycardia syndrome (POTS), prior sepsis, type 2 DM with peripheral autonomic neuropathy, and prior multiple rib fractures due to falls. She was recently hospitalized at [facility] from 3/10/2019-3/14/2019 with diagnoses of acute GI bleeding due to gastric, esophageal, and duodenal ulcers, syncope, vasovagal with iron deficiency anemia, left ventricular hypertrophy with ECHO showing EF of 60% with grade 1 diastolic dysfunction, and POTS per chart. The patient reports that this past Tuesday she was at her grandson's baseball game and stopped breathing then collapsed. It was thought she was choking so a Heimlich maneuver was performed. Patient also  reports that CPR was also performed. She was evaluated at [facility] and discharged the next day. She subsequently presented to [facility] ED with complaints of chest pain on 3/21/2019. She has not undergone any intervention for her carotid artery stenosis, but was suppose to be evaluated by vascular surgery at [facility] initially, but was not able to due to her syncopal event requiring hospitalization.  The plan was for her to be transferred to [facility] from [facility], however a bed was not available, and she was going to follow up on an outpatient basis. Since her admission here at [facility], she has been evaluated by vascular surgery who is not recommending any surgical intervention for the right ICA occlusion. Carotid duplex was ordered to see to help decide if she would benefit from any procedure. She reports having multiple syncopal episodes and subsequent falls (some with LOC and some without). She reports that she has sustained some injuries with some of the falls to include laceration to her head and rib fractures. The patient informs me that she was taking nitroglycerin several times during the day when she is experiencing chest pain. She reports that she has intermittent chest pain at baseline. She is also on midodrine at home and sometimes this causes her blood pressure to drastically increase and she has to take antihypertensives to lower her blood pressure. She does report her blood pressure being as low as 70's-80's in which she is symptomatic. She becomes dizzy, clammy, and will sometimes experience left-sided weakness. She reports intermittent dizziness when sitting up and standing. She currently complains of a frontal headache rated 4/10, described as pressure that has been ongoing intermittently since her admission. She has been receiving Percocet which is helping her symptoms. She denies any aggravating factors or associated symptoms. She has had chills, nausea, abdominal pain, dizziness, and balance issues. She does have baseline blurry vision due to diabetic retinopathy. She currently denies any fever, chest pain, dyspnea, numbness, tingling, weakness, or vomiting. Past Medical History:   Diagnosis Date    Anxiety 1/28/2010    DM (diabetes mellitus) ([facility] [address] 1/28/2010    HTN (hypertension) 1/28/2010    Hyperlipidemia 1/28/2010     No past surgical history on file.    Family History   Problem Relation Age of Onset    Heart Disease Mother     Cancer Father      Social History     Tobacco Use    Smoking status: Former Smoker   Substance Use Topics    Alcohol use: Yes      Current Facility-Administered Medications   Medication Dose Route Frequency Provider Last Rate Last Dose    0.9% sodium chloride infusion  75 mL/hr IntraVENous CONTINUOUS Ysabel Singh MD 75 mL/hr at 03/25/19 0919 75 mL/hr at 03/25/19 0919    hydrALAZINE (APRESOLINE) 20 mg/mL injection 20 mg  20 mg IntraVENous Q6H PRN Angelo Christianson MD   20 mg at 03/23/19 0924    metoprolol tartrate (LOPRESSOR) tablet 25 mg  25 mg Oral Q12H Luma Salinas MD   25 mg at 03/25/19 0917    busPIRone (BUSPAR) tablet 10 mg  10 mg Oral TID Sirena Zheng MD   10 mg at 03/25/19 8780    DULoxetine (CYMBALTA) capsule 30 mg  30 mg Oral BID Sirena Zheng MD   30 mg at 03/25/19 2005    ferrous sulfate tablet 325 mg  1 Tab Oral BID WITH MEALS Sirena Zheng MD   325 mg at 03/25/19 0918    melatonin tablet 3 mg  3 mg Oral QHS PRN Sirena Zheng MD   3 mg at 03/22/19 2140    pantoprazole (PROTONIX) tablet 40 mg  40 mg Oral BID Sirena Zheng MD   40 mg at 03/25/19 0917    polyethylene glycol (MIRALAX) packet 17 g  17 g Oral DAILY Sirena Zheng MD   17 g at 03/25/19 2342    rosuvastatin (CRESTOR) tablet 40 mg  40 mg Oral QHS Sirena Zheng MD   40 mg at 03/24/19 2211    acetaminophen (TYLENOL) tablet 650 mg  650 mg Oral Q4H PRN Angelo Christianson MD   650 mg at 03/24/19 1917    oxyCODONE-acetaminophen (PERCOCET) 5-325 mg per tablet 1 Tab  1 Tab Oral Q6H PRN Angelo Christianson MD   1 Tab at 03/25/19 0438    ondansetron (ZOFRAN) injection 4 mg  4 mg IntraVENous Q6H PRN Luma Salinas MD        LORazepam (ATIVAN) tablet 0.5 mg  0.5 mg Oral Q6H PRN Luma Salinas MD   0.5 mg at 03/24/19 2222    dextrose (D50W) injection syrg 12.5-25 g  12.5-25 g IntraVENous PRN Luma Salinas MD        sodium chloride (NS) flush 5-40 mL  5-40 mL IntraVENous Q8H Victor Manuel Fung MD   10 mL at 03/25/19 0705    sodium chloride (NS) flush 5-40 mL  5-40 mL IntraVENous PRN Charanjit Garces MD   10 mL at 03/25/19 0920    glucose chewable tablet 16 g  4 Tab Oral PRN Charanjit Garces MD        dextrose (D50W) injection syrg 12.5-25 g  25-50 mL IntraVENous PRN Victor Manuel Fung MD        glucagon (GLUCAGEN) injection 1 mg  1 mg IntraMUSCular PRN Charanjit Garces MD        insulin lispro (HUMALOG) injection   SubCUTAneous AC&HS Charanjit Garces MD   2 Units at 03/25/19 0705        Allergies   Allergen Reactions    Gabapentin Anaphylaxis     Swelling and rash    Ace Inhibitors Unknown (comments)    Shell-[place] Plus Other (comments)     Increased blood pressure 3/21/19- pt reports no allergy      Anexsia [Hydrocodone-Acetaminophen] Unknown (comments)     Pt reports no allergy      Pepto-Bismol [Bismuth Subsalicylate] Other (comments)     Tongue color change         Review of Systems:  A comprehensive review of systems was negative except for that written in the History of Present Illness. Objective:     Vitals:    03/25/19 0802 03/25/19 1142 03/25/19 1144 03/25/19 1146   BP: 176/88 168/67 96/68 (!) 84/43   Pulse: 70 72     Resp: 18 18     Temp: 98.3 °F (36.8 °C) 98.5 °F (36.9 °C)     SpO2: 99% 94%     Weight:       Height:            Physical Exam:  GENERAL: alert, cooperative, no distress, appears stated age  EYE: conjunctivae/corneas clear. PERRL, EOM's intact. NECK: neck supple and symmetrical, no carotid bruits  LUNG: clear to auscultation bilaterally  HEART: regular rate and rhythm, S1, S2 normal, no murmur, click, rub or gallop  ABDOMEN: soft, non-tender. Bowel sounds normal. No masses,  no organomegaly  EXTREMITIES:  extremities normal, atraumatic, no cyanosis or edema. 2+ pulses bilaterally  SKIN: Skin warm to touch. Neurologic Exam:  Mental Status:  Alert and oriented x 4. Appropriate affect, mood and behavior.        Language: Normal fluency. Able to name objects. Cranial Nerves:        Pupils equal, round and reactive to light. Visual fields full to confrontation. Extraocular movements intact. Facial sensation intact. Full facial strength, no asymmetry. Hearing grossly intact bilaterally. No dysarthria. Tongue protrudes to midline, palate elevates symmetrically. Shoulder shrug 5/5 bilaterally. Motor:    No pronator drift. Bulk and tone normal.      5/5 power in all extremities proximally and distally. No involuntary movements. Sensation:    Sensation intact throughout to light touch and pinprick. Coordination & Gait: FTN and HTS intact. Gait deferred. Recent Results (from the past 24 hour(s))   GLUCOSE, POC    Collection Time: 03/24/19  5:17 PM   Result Value Ref Range    Glucose (POC) 120 (H) 65 - 100 mg/dL    Performed by [facility]    GLUCOSE, POC    Collection Time: 03/24/19 10:14 PM   Result Value Ref Range    Glucose (POC) 161 (H) 65 - 100 mg/dL    Performed by [facility]    CBC WITH AUTOMATED DIFF    Collection Time: 03/25/19  4:44 AM   Result Value Ref Range    WBC 4.8 3.6 - 11.0 K/uL    RBC 3.02 (L) 3.80 - 5.20 M/uL    HGB 10.2 (L) 11.5 - 16.0 g/dL    HCT 33.8 (L) 35.0 - 47.0 %    .9 (H) 80.0 - 99.0 FL    MCH 33.8 26.0 - 34.0 PG    MCHC 30.2 30.0 - 36.5 g/dL    RDW 17.3 (H) 11.5 - 14.5 %    PLATELET 733 256 - 773 K/uL    MPV 10.1 8.9 - 12.9 FL    NRBC 0.0 0  WBC    ABSOLUTE NRBC 0.00 0.00 - 0.01 K/uL    NEUTROPHILS 56 32 - 75 %    LYMPHOCYTES 31 12 - 49 %    MONOCYTES 9 5 - 13 %    EOSINOPHILS 4 0 - 7 %    BASOPHILS 0 0 - 1 %    IMMATURE GRANULOCYTES 0 0.0 - 0.5 %    ABS. NEUTROPHILS 2.7 1.8 - 8.0 K/UL    ABS. LYMPHOCYTES 1.5 0.8 - 3.5 K/UL    ABS. MONOCYTES 0.4 0.0 - 1.0 K/UL    ABS. EOSINOPHILS 0.2 0.0 - 0.4 K/UL    ABS. BASOPHILS 0.0 0.0 - 0.1 K/UL    ABS. IMM.  GRANS. 0.0 0.00 - 0.04 K/UL    DF SMEAR SCANNED      RBC COMMENTS POLYCHROMASIA  1+        RBC COMMENTS ANISOCYTOSIS  1+        RBC COMMENTS MACROCYTOSIS  2+       METABOLIC PANEL, COMPREHENSIVE    Collection Time: 03/25/19  4:44 AM   Result Value Ref Range    Sodium 137 136 - 145 mmol/L    Potassium 4.0 3.5 - 5.1 mmol/L    Chloride 106 97 - 108 mmol/L    CO2 26 21 - 32 mmol/L    Anion gap 5 5 - 15 mmol/L    Glucose 134 (H) 65 - 100 mg/dL    BUN 11 6 - 20 MG/DL    Creatinine 1.44 (H) 0.55 - 1.02 MG/DL    BUN/Creatinine ratio 8 (L) 12 - 20      GFR est AA 45 (L) >60 ml/min/1.73m2    GFR est non-AA 37 (L) >60 ml/min/1.73m2    Calcium 8.5 8.5 - 10.1 MG/DL    Bilirubin, total 0.2 0.2 - 1.0 MG/DL    ALT (SGPT) 17 12 - 78 U/L    AST (SGOT) 24 15 - 37 U/L    Alk. phosphatase 96 45 - 117 U/L    Protein, total 6.8 6.4 - 8.2 g/dL    Albumin 2.3 (L) 3.5 - 5.0 g/dL    Globulin 4.5 (H) 2.0 - 4.0 g/dL    A-G Ratio 0.5 (L) 1.1 - 2.2     GLUCOSE, POC    Collection Time: 03/25/19  7:00 AM   Result Value Ref Range    Glucose (POC) 147 (H) 65 - 100 mg/dL    Performed by Carole Gallego    GLUCOSE, POC    Collection Time: 03/25/19 11:02 AM   Result Value Ref Range    Glucose (POC) 157 (H) 65 - 100 mg/dL    Performed by ROSHAN CUMMINGS      Imaging:  Imaging personally reviewed by Dr. Gala Molina. CTA shows occlusion or near occlusion of the right carotid at the bifurcation. Left carotid no significant stenosis. ACOM and PCOM collaterals look good. Non-dominant right vertebral artery origin is calcified (difficult to assess), but otherwise looks good. Dominant right vertebral artery normal. 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     Assessment:     Hospital Problems  Date Reviewed: 3/21/2019          Codes Class Noted POA    Sinus tachycardia ICD-10-CM: R00.0  ICD-9-CM: 427.89  3/21/2019 Unknown        SOB (shortness of breath) ICD-10-CM: R06.02  ICD-9-CM: 786.05  3/21/2019 Unknown        Acute chest pain ICD-10-CM: R07.9  ICD-9-CM: 786.50  3/21/2019 Unknown        * (Principal) Bilateral carotid artery stenosis ICD-10-CM: I65.23  ICD-9-CM: 433.10, 433.30  3/21/2019 Unknown              Plan:   60year-old female admitted with complaints of chest pain with multiple syncopal episodes with occlusion or near occlusion of the right ICA. She has a history of POTS and anemia with recent GI bleeding which is most likely a contributing factor of her symptoms. ACOM and PCOM collaterals look good. Non-dominant right vertebral artery origin is calcified (difficult to assess), but otherwise looks good. Dominant right vertebral artery normal. There is no concern for vertebrobasilar insufficiency. I suspect her syncopal episodes are related to her recent use of blood pressure medications and/or underlying cardiac issue causing a sudden drop in her blood pressure. I recommend further blood pressure management and evaluation per cardiology and/or primary team. Would recommend medical management with 81 mg of Aspirin at this time given recent GI bleeding if ok with GI. There is a possible 2 mm right PCOM aneurysm that will need follow up on an outpatient basis. Plan discussed with Dr. Sj Rosa. Thank you for this consult and participating in the care of this patient. I have discussed the diagnosis with the patient and the intended plan as seen in the above orders. Patient is in agreement.       Signed By: Karley Olivia NP     March 25, 2019

## 2019-03-25 NOTE — PROGRESS NOTES
CM reviewed therapy notes- PT/OT stated no needs, anticipate patient will discharge home when medically stable- has seven children and has family support, awaiting neuro IR consultation.  Doloris Crimes, MSW

## 2019-03-25 NOTE — CONSULTS
Neurointerventional Surgery Consult    Full consult note to follow. Patient admitted 3/21/2019 for chief complaint of chest pain. Has had multiple syncopal episodes (some with and some without LOC) and subsequent falls. Was recently at [facility] 3/10/2019-3/14/2019 and diagnosed with acute GI bleeding secondary to esophageal, gastric, and duodenal bleeding ulcers. CTA shows occlusion or near occlusion of the right carotid at the bifurcation. Left carotid shows no stenosis. ACOM and PCOM collaterals look good. Non-dominant right vertebral origin is calcified (difficult to assess), but otherwise looks good. Dominant right vertebral normal. There is no concern for vertebrobasilar insufficiency. 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 She does become symptomatic when her blood pressure drops and experiences dizziness and clammy skin. She reports that prior to coming into the hospital she had taken multiple doses of nitroglycerin for chest pain which can certainly drop her blood pressure. She reported her BP drops to SBP of 70's-80's. She is taking midodrine at home, but sometimes it raises her blood pressure too high and she has required antihypertensives. Would recommend further blood pressure management per primary team as her fluctuating blood pressure is causing hypoperfusion and subsequent syncopal episodes. Given her medical problems of anemia and POTS (positional orthostatic tachycardia syndrome)  these are all contributing factors of her symptoms. Would recommend medical management with 81 mg of aspirin at this time given recent GI bleeding if ok with GI. Dr. Nic Davalos will see and examine patient as well.     Iliana , [facility]  Neurocritical care NP   Neurointerventional Surgery

## 2019-03-25 NOTE — PROGRESS NOTES
0730 Bedside shift change report given to FRANKI Farrell (oncoming nurse) by DAYAN Huynh (offgoing nurse). Report included the following information SBAR, Procedure Summary, Intake/Output, MAR and Recent Results.

## 2019-03-26 VITALS
DIASTOLIC BLOOD PRESSURE: 93 MMHG | OXYGEN SATURATION: 98 % | BODY MASS INDEX: 31.76 KG/M2 | HEIGHT: 67 IN | SYSTOLIC BLOOD PRESSURE: 162 MMHG | WEIGHT: 202.38 LBS | RESPIRATION RATE: 18 BRPM | TEMPERATURE: 98 F | HEART RATE: 71 BPM

## 2019-03-26 LAB
ANION GAP SERPL CALC-SCNC: 3 MMOL/L (ref 5–15)
BASOPHILS # BLD: 0 K/UL (ref 0–0.1)
BASOPHILS NFR BLD: 0 % (ref 0–1)
BUN SERPL-MCNC: 11 MG/DL (ref 6–20)
BUN/CREAT SERPL: 8 (ref 12–20)
CALCIUM SERPL-MCNC: 8.3 MG/DL (ref 8.5–10.1)
CHLORIDE SERPL-SCNC: 107 MMOL/L (ref 97–108)
CO2 SERPL-SCNC: 29 MMOL/L (ref 21–32)
CREAT SERPL-MCNC: 1.33 MG/DL (ref 0.55–1.02)
DIFFERENTIAL METHOD BLD: ABNORMAL
EOSINOPHIL # BLD: 0.2 K/UL (ref 0–0.4)
EOSINOPHIL NFR BLD: 4 % (ref 0–7)
ERYTHROCYTE [DISTWIDTH] IN BLOOD BY AUTOMATED COUNT: 17.4 % (ref 11.5–14.5)
GLUCOSE BLD STRIP.AUTO-MCNC: 141 MG/DL (ref 65–100)
GLUCOSE SERPL-MCNC: 138 MG/DL (ref 65–100)
HCT VFR BLD AUTO: 31.4 % (ref 35–47)
HGB BLD-MCNC: 9.1 G/DL (ref 11.5–16)
IMM GRANULOCYTES # BLD AUTO: 0 K/UL (ref 0–0.04)
IMM GRANULOCYTES NFR BLD AUTO: 0 % (ref 0–0.5)
LYMPHOCYTES # BLD: 1.5 K/UL (ref 0.8–3.5)
LYMPHOCYTES NFR BLD: 28 % (ref 12–49)
MCH RBC QN AUTO: 32.7 PG (ref 26–34)
MCHC RBC AUTO-ENTMCNC: 29 G/DL (ref 30–36.5)
MCV RBC AUTO: 112.9 FL (ref 80–99)
MONOCYTES # BLD: 0.5 K/UL (ref 0–1)
MONOCYTES NFR BLD: 9 % (ref 5–13)
NEUTS SEG # BLD: 3.3 K/UL (ref 1.8–8)
NEUTS SEG NFR BLD: 59 % (ref 32–75)
NRBC # BLD: 0 K/UL (ref 0–0.01)
NRBC BLD-RTO: 0 PER 100 WBC
PLATELET # BLD AUTO: 187 K/UL (ref 150–400)
PMV BLD AUTO: 10.2 FL (ref 8.9–12.9)
POTASSIUM SERPL-SCNC: 4.1 MMOL/L (ref 3.5–5.1)
RBC # BLD AUTO: 2.78 M/UL (ref 3.8–5.2)
RBC MORPH BLD: ABNORMAL
SERVICE CMNT-IMP: ABNORMAL
SODIUM SERPL-SCNC: 139 MMOL/L (ref 136–145)
WBC # BLD AUTO: 5.5 K/UL (ref 3.6–11)

## 2019-03-26 PROCEDURE — 74011250637 HC RX REV CODE- 250/637: Performed by: FAMILY MEDICINE

## 2019-03-26 PROCEDURE — 36415 COLL VENOUS BLD VENIPUNCTURE: CPT

## 2019-03-26 PROCEDURE — 74011636637 HC RX REV CODE- 636/637: Performed by: FAMILY MEDICINE

## 2019-03-26 PROCEDURE — 74011250637 HC RX REV CODE- 250/637: Performed by: NURSE PRACTITIONER

## 2019-03-26 PROCEDURE — 85025 COMPLETE CBC W/AUTO DIFF WBC: CPT

## 2019-03-26 PROCEDURE — 80048 BASIC METABOLIC PNL TOTAL CA: CPT

## 2019-03-26 PROCEDURE — 74011250637 HC RX REV CODE- 250/637: Performed by: INTERNAL MEDICINE

## 2019-03-26 PROCEDURE — 82962 GLUCOSE BLOOD TEST: CPT

## 2019-03-26 RX ORDER — METOPROLOL TARTRATE 25 MG/1
25 TABLET, FILM COATED ORAL EVERY 12 HOURS
Qty: 60 TAB | Refills: 1 | Status: ON HOLD | OUTPATIENT
Start: 2019-03-26 | End: 2022-09-01 | Stop reason: SDUPTHER

## 2019-03-26 RX ADMIN — OXYCODONE AND ACETAMINOPHEN 1 TABLET: 5; 325 TABLET ORAL at 03:39

## 2019-03-26 RX ADMIN — DULOXETINE 30 MG: 30 CAPSULE, DELAYED RELEASE ORAL at 08:44

## 2019-03-26 RX ADMIN — BUSPIRONE HYDROCHLORIDE 10 MG: 10 TABLET ORAL at 08:44

## 2019-03-26 RX ADMIN — ASPIRIN 81 MG CHEWABLE TABLET 81 MG: 81 TABLET CHEWABLE at 08:44

## 2019-03-26 RX ADMIN — INSULIN LISPRO 2 UNITS: 100 INJECTION, SOLUTION INTRAVENOUS; SUBCUTANEOUS at 06:49

## 2019-03-26 RX ADMIN — Medication 10 ML: at 06:50

## 2019-03-26 RX ADMIN — METOPROLOL TARTRATE 25 MG: 25 TABLET ORAL at 08:44

## 2019-03-26 RX ADMIN — FERROUS SULFATE TAB 325 MG (65 MG ELEMENTAL FE) 325 MG: 325 (65 FE) TAB at 08:44

## 2019-03-26 RX ADMIN — OXYCODONE AND ACETAMINOPHEN 1 TABLET: 5; 325 TABLET ORAL at 09:42

## 2019-03-26 RX ADMIN — PANTOPRAZOLE SODIUM 40 MG: 40 TABLET, DELAYED RELEASE ORAL at 08:44

## 2019-03-26 RX ADMIN — MIDODRINE HYDROCHLORIDE 2.5 MG: 5 TABLET ORAL at 06:48

## 2019-03-26 RX ADMIN — POLYETHYLENE GLYCOL 3350 17 G: 17 POWDER, FOR SOLUTION ORAL at 08:44

## 2019-03-26 NOTE — PROGRESS NOTES
Hospital follow-up PCP transitional care appointment has been scheduled with Dr. Nory Paz, NP for Tuesday, 4/2/19 at 3:15 p.m. Pending patient discharge.   Sofya Luna, Care Management Specialist.

## 2019-03-26 NOTE — DISCHARGE SUMMARY
Discharge Summary       PATIENT ID: Miguel Angel Garcia  MRN: [id number]   YOB: 1960    DATE OF ADMISSION: 3/21/2019  9:01 PM    DATE OF DISCHARGE: 03/26/19  PRIMARY CARE PROVIDER: Falguni Young MD       DISCHARGING PROVIDER: Donnette Dubin, MD    To contact this individual call [phone] and ask the  to page. If unavailable ask to be transferred the Adult Hospitalist Department. CONSULTATIONS: IP CONSULT TO HOSPITALIST  IP CONSULT TO VASCULAR SURGERY  IP CONSULT TO GASTROENTEROLOGY  IP CONSULT TO NEUROINTERVENTIONAL SURGERY  IP CONSULT TO CARDIOLOGY      PROCEDURES/SURGERIES: * No surgery found *    IMAGING  Xr Chest Pa Lat    Result Date: 3/21/2019  IMPRESSION: Lungs are clear of an acute process. There is a fracture of the right fifth rib laterally age indeterminant. There is also questionable fracture of a left mid rib           [address]:   # Recurrent syncope:  Probably multiple factors including uncontrolled BP, orthostatic hypotension, anemia, and carotid artery stenosis. Continue  PT/OT. Spoke with Dr. Mohit Murcia on 03/23, [facility] consult noted, recs medical management  Patient prescribed with compression stocking. Patient said she had extensive cardiac work up at her previous hospitalization including tilt table test and cardiac stress test. She was found to have orthostatic hypotension.      # Hypertension.    On Lopressor  BP is labile  Will consult cards for POTS/orthostatic hypotension  Hydralazine as PRN and monitor.      # Bilateral carotid artery stenosis. - No focal neurological deficits on exam.    - Appreciate vascular surgery input.  - Started on Aspirin      # Anemia. Acute on chronic.   - Due to PUD. H/H has not been trending down now. - Appreciate GI input, will continue to monitor H/H.  Continue iron supplement.   - She has had a recent EGD which had revealed esophageal, gastric, and duodenal ulcers.    - Cont PPI     # JC  - etiology unclear  ?hypotensive kidney injury  - IVF started  - Repeat BMP in am     # Acute chest pain:   - Likely musculoskeletal, tenderness on palpation on exam with recent Heimlich maneuver. - Has noted significant cardiac risk factors.  Troponin levels are negative.      # Shortness of breath.   CXR with clear lung fields. Fracture of the right fifth rib laterally age indeterminant. Questionable fracture of a left mid rib   - was on supplemental oxygen as needed.       # Stage III chronic kidney disease.  Creatinine is stable.      # Anxiety.  Patient is anxious, continue her Buspar and Cumbalta, add ativan as PRN.  Metoprolol is helping her.      # Type 2 diabetes mellitus.  was on SSI during hospital admission and d/kenneth on Metformin      # PVD        DISCHARGE DIAGNOSES / PLAN:    # Recurrent syncope:  # Hypertension.    # Bilateral carotid artery stenosis. # Anemia. Acute on chronic.    # JC  # Acute chest pain:   # Shortness of breath.   # Stage III chronic kidney disease.  Creatinine is stable.   # Anxiety.    # Type 2 diabetes mellitus.    # PVD      Patient Active Problem List   Diagnosis Code    DM (diabetes mellitus) ([facility] [address] E11.9    HTN (hypertension) I10    Hyperlipidemia E78.5    Anxiety F41.9    Sinus tachycardia R00.0    SOB (shortness of breath) R06.02    Acute chest pain R07.9    Bilateral carotid artery stenosis I65.23               PENDING TEST RESULTS:   At the time of discharge the following test results are still pending: None     FOLLOW UP APPOINTMENTS:    Follow-up Information     Follow up With Specialties Details Why Contact Info    Other, MD Rozina  Schedule an appointment as soon as possible for a visit in 1 week Post hospital discharge follow up  Patient can only remember the practice name and not the physician      [facility]   Schedule an appointment as soon as possible for a visit in 2 weeks  Please contact Your regular Cardiologty for follow up     [facility]   Schedule an appointment as soon as possible for a visit Follow up  Please contact Your regular Nephrologist for follow up            ADDITIONAL CARE RECOMMENDATIONS: none     DIET: Healthy heart diet     ACTIVITY: as tolerated     WOUND CARE: none     EQUIPMENT needed: none       DISCHARGE MEDICATIONS:  Current Discharge Medication List      START taking these medications    Details   metoprolol tartrate (LOPRESSOR) 25 mg tablet Take 1 Tab by mouth every twelve (12) hours. Qty: 60 Tab, Refills: 1         CONTINUE these medications which have NOT CHANGED    Details   acetaminophen (TYLENOL) 500 mg tablet Take 1,000 mg by mouth every six (6) hours as needed for Pain. aspirin (ASPIRIN) 325 mg tablet Take 325 mg by mouth daily. busPIRone (BUSPAR) 10 mg tablet Take 10 mg by mouth three (3) times daily. DULoxetine (CYMBALTA) 30 mg capsule Take 30 mg by mouth two (2) times a day. ferrous sulfate 325 mg (65 mg iron) tablet Take  by mouth two (2) times daily (with meals). magnesium oxide (MAG-OX) 400 mg tablet Take 400 mg by mouth daily. melatonin 3 mg tablet Take 10 mg by mouth nightly as needed. metFORMIN (GLUCOPHAGE) 500 mg tablet Take 500 mg by mouth two (2) times daily (with meals). pantoprazole (PROTONIX) 40 mg tablet Take 40 mg by mouth two (2) times a day. polyethylene glycol (MIRALAX) 17 gram packet Take 17 g by mouth daily. rosuvastatin (CRESTOR) 40 mg tablet Take 40 mg by mouth nightly. midodrine (PROAMITINE) 2.5 mg tablet Take 2.5 mg by mouth three (3) times daily (with meals). potassium chloride (KLOR-CON M10) 10 mEq tablet Take 1 Tab by mouth two (2) times a day.   Qty: 60 Tab, Refills: 2             All Micro Results     Procedure Component Value Units Date/Time    CULTURE, URINE [[id number]]  (Abnormal) Collected:  03/22/19 0002    Order Status:  Completed Specimen:  Urine Updated:  03/23/19 2653     Special Requests: --        NO SPECIAL REQUESTS  Reflexed from [id number]       [place] Count --        >100,000  COLONIES/mL       Culture result:       STREPTOCOCCI, BETA HEMOLYTIC GROUP B (PREDOMINATING) Penicillin and ampicillin are drugs of choice for treatment of beta-hemolytic streptococcal infections. Susceptibility testing of penicillins and beta-lactams approved by the FDA for treatment of beta-hemolytic streptococcal infections need not be performed routinely, because nonsusceptible isolates are extremely rare. CLSI 2012                  MIXED UROGENITAL JUSTUS ISOLATED                Recent Results (from the past 24 hour(s))   GLUCOSE, POC    Collection Time: 03/25/19 11:02 AM   Result Value Ref Range    Glucose (POC) 157 (H) 65 - 100 mg/dL    Performed by Digital OrchidTORIN ZOILA    GLUCOSE, POC    Collection Time: 03/25/19  4:17 PM   Result Value Ref Range    Glucose (POC) 143 (H) 65 - 100 mg/dL    Performed by ROSHAN ZOILA    GLUCOSE, POC    Collection Time: 03/25/19  9:08 PM   Result Value Ref Range    Glucose (POC) 172 (H) 65 - 100 mg/dL    Performed by Edis Oh    CBC WITH AUTOMATED DIFF    Collection Time: 03/26/19  3:45 AM   Result Value Ref Range    WBC 5.5 3.6 - 11.0 K/uL    RBC 2.78 (L) 3.80 - 5.20 M/uL    HGB 9.1 (L) 11.5 - 16.0 g/dL    HCT 31.4 (L) 35.0 - 47.0 %    .9 (H) 80.0 - 99.0 FL    MCH 32.7 26.0 - 34.0 PG    MCHC 29.0 (L) 30.0 - 36.5 g/dL    RDW 17.4 (H) 11.5 - 14.5 %    PLATELET 820 237 - 578 K/uL    MPV 10.2 8.9 - 12.9 FL    NRBC 0.0 0  WBC    ABSOLUTE NRBC 0.00 0.00 - 0.01 K/uL    NEUTROPHILS 59 32 - 75 %    LYMPHOCYTES 28 12 - 49 %    MONOCYTES 9 5 - 13 %    EOSINOPHILS 4 0 - 7 %    BASOPHILS 0 0 - 1 %    IMMATURE GRANULOCYTES 0 0.0 - 0.5 %    ABS. NEUTROPHILS 3.3 1.8 - 8.0 K/UL    ABS. LYMPHOCYTES 1.5 0.8 - 3.5 K/UL    ABS. MONOCYTES 0.5 0.0 - 1.0 K/UL    ABS. EOSINOPHILS 0.2 0.0 - 0.4 K/UL    ABS. BASOPHILS 0.0 0.0 - 0.1 K/UL    ABS. IMM.  GRANS. 0.0 0.00 - 0.04 K/UL    DF SMEAR SCANNED      RBC COMMENTS POLYCHROMASIA  1+        RBC COMMENTS ANISOCYTOSIS  1+        RBC COMMENTS MACROCYTOSIS  2+       METABOLIC PANEL, BASIC    Collection Time: 03/26/19  3:45 AM   Result Value Ref Range    Sodium 139 136 - 145 mmol/L    Potassium 4.1 3.5 - 5.1 mmol/L    Chloride 107 97 - 108 mmol/L    CO2 29 21 - 32 mmol/L    Anion gap 3 (L) 5 - 15 mmol/L    Glucose 138 (H) 65 - 100 mg/dL    BUN 11 6 - 20 MG/DL    Creatinine 1.33 (H) 0.55 - 1.02 MG/DL    BUN/Creatinine ratio 8 (L) 12 - 20      GFR est AA 49 (L) >60 ml/min/1.73m2    GFR est non-AA 41 (L) >60 ml/min/1.73m2    Calcium 8.3 (L) 8.5 - 10.1 MG/DL   GLUCOSE, POC    Collection Time: 03/26/19  6:35 AM   Result Value Ref Range    Glucose (POC) 141 (H) 65 - 100 mg/dL    Performed by Ashli Ashraf            NOTIFY YOUR PHYSICIAN FOR ANY OF THE FOLLOWING:   Fever over 101 degrees for 24 hours. Chest pain, shortness of breath, fever, chills, nausea, vomiting, diarrhea, change in mentation, falling, weakness, bleeding. Severe pain or pain not relieved by medications. Or, any other signs or symptoms that you may have questions about. DISPOSITION:   x Home With:   OT  PT  HH  RN       Long term SNF/Inpatient Rehab    Independent/assisted living    Hospice    Other:       PATIENT CONDITION AT DISCHARGE:     Functional status    Poor     Deconditioned    x Independent      Cognition     Lucid     Forgetful     Dementia      Catheters/lines (plus indication)    [place]     PICC     PEG    x None      Code status   x  Full code     DNR      PHYSICAL EXAMINATION AT DISCHARGE:  Gen:  No distress, alert  HEENT:  Normal cephalic atraumatic, extra-occular movements are intact. Neck:  Supple, No JVD  Lungs:  Clear bilaterally, no wheeze, no rales, normal effort  Heart:  Regular Rate and Rhythm, normal S1 and S2, no edema  Abdomen:  Soft, non tender, normal bowel sounds, no guarding.   Extremities:  Well perfused, no cyanosis or edema  Neurological:  Awake and alert, CN's are intact, normal strength throughout extremities  Skin:  No rashes or moles  Mental Status:  Normal thought process, does not appear anxious      CHRONIC MEDICAL DIAGNOSES:  Problem List as of 3/26/2019 Date Reviewed: 3/21/2019          Codes Class Noted - Resolved    Sinus tachycardia ICD-10-CM: R00.0  ICD-9-CM: 427.89  3/21/2019 - Present        SOB (shortness of breath) ICD-10-CM: R06.02  ICD-9-CM: 786.05  3/21/2019 - Present        Acute chest pain ICD-10-CM: R07.9  ICD-9-CM: 786.50  3/21/2019 - Present        * (Principal) Bilateral carotid artery stenosis ICD-10-CM: I65.23  ICD-9-CM: 433.10, 433.30  3/21/2019 - Present        [facility] ICD-10-CM: E11.9  ICD-9-CM: 250.00  1/28/2010 - Present        HTN (hypertension) ICD-10-CM: I10  ICD-9-CM: 401.9  1/28/2010 - Present        Hyperlipidemia ICD-10-CM: E78.5  ICD-9-CM: 272.4  1/28/2010 - Present        Anxiety ICD-10-CM: F41.9  ICD-9-CM: 300.00  1/28/2010 - Present                Discussed with patient and family. Explained the importance of following up, Compliance with medications and recommendations on discharge,Side effect profile of medications were explained. Safety precautions at home and while taking pain medications also explained. All questions answered to the satisfaction of the patient/family. Discussed with consultant(s) who are agreeable to the discharge. Verbal and written instructions on discharge given. Explained about Discharge medications and side effect profile. Advised patient/family to followup with their pcp for medication refills and preauthorization of medications, Home health orders. checkups,screenign programs as appropriate for age.        Thank you Other, MD Rozina for taking care of your patient, Please call with any questions.       Greater than 35 minutes were spent with the patient on counseling and coordination of care    Signed:   Francis Mireles MD  3/26/2019  9:40 AM

## 2019-03-26 NOTE — DISCHARGE INSTRUCTIONS
Patient Education        Fainting: Care Instructions  Your Care Instructions    When you faint, or pass out, you lose consciousness for a short time. A brief drop in blood flow to the brain often causes it. When you fall or lie down, more blood flows to your brain and you regain consciousness. Emotional stress, pain, or overheating--especially if you have been standing--can make you faint. In these cases, fainting is usually not serious. But fainting can be a sign of a more serious problem. Your doctor may want you to have more tests to rule out other causes. The treatment you need depends on the reason why you fainted. The doctor has checked you carefully, but problems can develop later. If you notice any problems or new symptoms, get medical treatment right away. Follow-up care is a key part of your treatment and safety. Be sure to make and go to all appointments, and call your doctor if you are having problems. It's also a good idea to know your test results and keep a list of the medicines you take. How can you care for yourself at home? · Drink plenty of fluids to prevent dehydration. If you have kidney, heart, or liver disease and have to limit fluids, talk with your doctor before you increase your fluid intake. When should you call for help? Call 911 anytime you think you may need emergency care. For example, call if:    · You have symptoms of a heart problem. These may include:  ? Chest pain or pressure. ? Severe trouble breathing. ? A fast or irregular heartbeat. ? Lightheadedness or sudden weakness. ? Coughing up pink, foamy mucus. ? Passing out. After you call 911, the  may tell you to chew 1 adult-strength or 2 to 4 low-dose aspirin. Wait for an ambulance. Do not try to drive yourself.     · You have symptoms of a stroke. These may include:  ? Sudden numbness, tingling, weakness, or loss of movement in your face, arm, or leg, especially on only one side of your body.   ? Sudden vision changes. ? Sudden trouble speaking. ? Sudden confusion or trouble understanding simple statements. ? Sudden problems with walking or balance. ? A sudden, severe headache that is different from past headaches.     · You passed out (lost consciousness) again.    Watch closely for changes in your health, and be sure to contact your doctor if:    · You do not get better as expected. Where can you learn more? Go to http://jalen-timur.info/. Enter L575 in the search box to learn more about \"Fainting: Care Instructions. \"  Current as of: September 23, 2018  Content Version: 11.9  © 5022-4155 [employer]. Care instructions adapted under license by [employer] (which disclaims liability or warranty for this information). If you have questions about a medical condition or this instruction, always ask your healthcare professional. [address] any warranty or liability for your use of this information.

## 2019-03-26 NOTE — PROGRESS NOTES
0730:  Bedside shift change report given to Levi Ramsey, RN (oncoming nurse) by Thelma Jenkins RN (offgoing nurse). Report included the following information SBAR, Kardex, Procedure Summary, Intake/Output, MAR and Recent Results. 1024: I have reviewed discharge instructions with the patient. The patient verbalized understanding. IV and tele removed. Problem: Falls - Risk of  Goal: *Absence of Falls  Description  Document Arva Nam Fall Risk and appropriate interventions in the flowsheet. Outcome: Progressing Towards Goal     Problem: Syncope  Goal: *Absence of injury  Outcome: Progressing Towards Goal  Goal: Decrease or eliminate episodes of syncope  Outcome: Progressing Towards Goal     Problem: Pressure Injury - Risk of  Goal: *Prevention of pressure injury  Description  Document [place] Scale and appropriate interventions in the flowsheet.   Outcome: Progressing Towards Goal

## 2019-03-26 NOTE — PROGRESS NOTES
Cardiology Progress Note            Admit Date: 3/21/2019  Admit Diagnosis: Acute chest pain [R07.9]  Bilateral carotid artery stenosis [I65.23]  SOB (shortness of breath) [R06.02]  Sinus tachycardia [R00.0]  Date: 3/26/2019     Time: 8:36 AM         Assessment and Plan     1. Hx of POTS              -Continue midodrine 2.5 mg every 8 hours ATC              -would continue Lopressor 25 mg every 12 hours              -Continue Anibal hose              -Recommend close follow up with her nephrologist to manage BP/fluctuations in BP. Would also follow up with Dr. Chay Smith (cardiology)   -Discussed with pt further possible interventions if current treatment not effective in future (I.e. Increase salt intake, florinef- again, needs close follow up with nephrology)     2. Hx of Carotid artery stenosis              -No focal deficits              -Vascular surgery and Interventional neurology. - no interventions planned.     3. Anemia:  hgb 9.1 from 10.2              -Hx of recent GI bleed- no active bleeding now.     4. Type II DM     Pt with no further syncope. Less fluctuation of BP overnight and was able to walk to bathroom without dizziness- no syncope. D/W pt- will need to tolerate higher BP to avoid hypotension/ dizziness/syncope. Needs close follow up with her nephrologist for management. Ok to discharge from cardiology perspective. Close follow up with nephrologist. Follow up with Dr. Chay Smith as well. Cardiology attending: seen and examined. Agree with assess and plan  No further spells. Exam unchanged. Continue midodrine, metoprolol and support hose. Going forward could consider increased midodrine dose and addition of florinef. Needs no further cardiac testing. Will see prn     Subjective:   Manual [place] was up to bathroom last night with no further dizziness. No syncope. BP elevated but less fluctuation.       Objective:      Physical Exam:                Visit Vitals  BP (!) 162/93 (BP 1 Location: Left arm, BP Patient Position: Sitting)   Pulse 71   Temp 98 °F (36.7 °C)   Resp 18   Ht 5' 7\" (1.702 m)   Wt 202 lb 6.1 oz (91.8 kg)   LMP 05/19/2009   SpO2 98%   BMI 31.70 kg/m²          General Appearance:   Well developed, well nourished,alert and oriented x 3, and   individual in no acute distress. Ears/Nose/Mouth/Throat:    Hearing grossly normal.         Neck:  Supple. Chest:    Lungs clear to auscultation bilaterally. Cardiovascular:   Regular rate and rhythm, S1, S2 normal, no murmur. Abdomen:    Soft, non-distended. Extremities:  No edema bilaterally. Skin:  Warm and dry. Telemetry: normal sinus rhythm          Data Review:    Labs:    Recent Results (from the past 24 hour(s))   GLUCOSE, POC    Collection Time: 03/25/19 11:02 AM   Result Value Ref Range    Glucose (POC) 157 (H) 65 - 100 mg/dL    Performed by [facility]    GLUCOSE, POC    Collection Time: 03/25/19  4:17 PM   Result Value Ref Range    Glucose (POC) 143 (H) 65 - 100 mg/dL    Performed by [facility]    GLUCOSE, POC    Collection Time: 03/25/19  9:08 PM   Result Value Ref Range    Glucose (POC) 172 (H) 65 - 100 mg/dL    Performed by Shivam Goins    CBC WITH AUTOMATED DIFF    Collection Time: 03/26/19  3:45 AM   Result Value Ref Range    WBC 5.5 3.6 - 11.0 K/uL    RBC 2.78 (L) 3.80 - 5.20 M/uL    HGB 9.1 (L) 11.5 - 16.0 g/dL    HCT 31.4 (L) 35.0 - 47.0 %    .9 (H) 80.0 - 99.0 FL    MCH 32.7 26.0 - 34.0 PG    MCHC 29.0 (L) 30.0 - 36.5 g/dL    RDW 17.4 (H) 11.5 - 14.5 %    PLATELET 400 988 - 752 K/uL    MPV 10.2 8.9 - 12.9 FL    NRBC 0.0 0  WBC    ABSOLUTE NRBC 0.00 0.00 - 0.01 K/uL    NEUTROPHILS 59 32 - 75 %    LYMPHOCYTES 28 12 - 49 %    MONOCYTES 9 5 - 13 %    EOSINOPHILS 4 0 - 7 %    BASOPHILS 0 0 - 1 %    IMMATURE GRANULOCYTES 0 0.0 - 0.5 %    ABS. NEUTROPHILS 3.3 1.8 - 8.0 K/UL    ABS. LYMPHOCYTES 1.5 0.8 - 3.5 K/UL    ABS.  MONOCYTES 0.5 0.0 - 1.0 K/UL    ABS. EOSINOPHILS 0.2 0.0 - 0.4 K/UL    ABS. BASOPHILS 0.0 0.0 - 0.1 K/UL    ABS. IMM.  GRANS. 0.0 0.00 - 0.04 K/UL    DF SMEAR SCANNED      RBC COMMENTS POLYCHROMASIA  1+        RBC COMMENTS ANISOCYTOSIS  1+        RBC COMMENTS MACROCYTOSIS  2+       METABOLIC PANEL, BASIC    Collection Time: 03/26/19  3:45 AM   Result Value Ref Range    Sodium 139 136 - 145 mmol/L    Potassium 4.1 3.5 - 5.1 mmol/L    Chloride 107 97 - 108 mmol/L    CO2 29 21 - 32 mmol/L    Anion gap 3 (L) 5 - 15 mmol/L    Glucose 138 (H) 65 - 100 mg/dL    BUN 11 6 - 20 MG/DL    Creatinine 1.33 (H) 0.55 - 1.02 MG/DL    BUN/Creatinine ratio 8 (L) 12 - 20      GFR est AA 49 (L) >60 ml/min/1.73m2    GFR est non-AA 41 (L) >60 ml/min/1.73m2    Calcium 8.3 (L) 8.5 - 10.1 MG/DL   GLUCOSE, POC    Collection Time: 03/26/19  6:35 AM   Result Value Ref Range    Glucose (POC) 141 (H) 65 - 100 mg/dL    Performed by Michelle Leon           Radiology:        Current Facility-Administered Medications   Medication Dose Route Frequency    0.9% sodium chloride infusion  75 mL/hr IntraVENous CONTINUOUS    aspirin chewable tablet 81 mg  81 mg Oral DAILY    midodrine (PROAMITINE) tablet 2.5 mg  2.5 mg Oral Q8H    metoprolol tartrate (LOPRESSOR) tablet 25 mg  25 mg Oral Q12H    busPIRone (BUSPAR) tablet 10 mg  10 mg Oral TID    DULoxetine (CYMBALTA) capsule 30 mg  30 mg Oral BID    ferrous sulfate tablet 325 mg  1 Tab Oral BID WITH MEALS    melatonin tablet 3 mg  3 mg Oral QHS PRN    pantoprazole (PROTONIX) tablet 40 mg  40 mg Oral BID    polyethylene glycol (MIRALAX) packet 17 g  17 g Oral DAILY    rosuvastatin (CRESTOR) tablet 40 mg  40 mg Oral QHS    acetaminophen (TYLENOL) tablet 650 mg  650 mg Oral Q4H PRN    oxyCODONE-acetaminophen (PERCOCET) 5-325 mg per tablet 1 Tab  1 Tab Oral Q6H PRN    ondansetron (ZOFRAN) injection 4 mg  4 mg IntraVENous Q6H PRN    LORazepam (ATIVAN) tablet 0.5 mg  0.5 mg Oral Q6H PRN    dextrose (D50W) injection syrg 12.5-25 g  12.5-25 g IntraVENous PRN    sodium chloride (NS) flush 5-40 mL  5-40 mL IntraVENous Q8H    sodium chloride (NS) flush 5-40 mL  5-40 mL IntraVENous PRN    glucose chewable tablet 16 g  4 Tab Oral PRN    dextrose (D50W) injection syrg 12.5-25 g  25-50 mL IntraVENous PRN    glucagon (GLUCAGEN) injection 1 mg  1 mg IntraMUSCular PRN    insulin lispro (HUMALOG) injection   SubCUTAneous AC&HS          Matheus Ford.  FRANTZ Gomez     [facility] [address] [place] [address], [address] [place] [address] [address]   [address] [facility]   [phone]

## 2019-03-26 NOTE — PROGRESS NOTES
The CM met with patient at bedside- daughter-in-law will transport patient home today, patient denied having any concerns for transition home. The patient has follow-up PCP appointment already scheduled. The patient is up at rachell in the room, PT/OT stated no needs at this time. CM will continue to follow as discharge needs arise. The CM presented patient with IM letter- signed copy on chart, additional copy left at bedside with the patient.  KATHRINE Callaway

## 2021-12-13 ENCOUNTER — TRANSCRIBE ORDER (OUTPATIENT)
Dept: SCHEDULING | Age: 61
End: 2021-12-13

## 2021-12-13 DIAGNOSIS — I65.29 CAROTID ARTERY STENOSIS: Primary | ICD-10-CM

## 2022-01-18 ENCOUNTER — HOSPITAL ENCOUNTER (OUTPATIENT)
Dept: CT IMAGING | Age: 62
Discharge: HOME | End: 2022-01-18
Attending: SURGERY
Payer: MEDICARE

## 2022-01-18 DIAGNOSIS — I65.29 CAROTID ARTERY STENOSIS: ICD-10-CM

## 2022-01-18 PROCEDURE — 70498 CT ANGIOGRAPHY NECK: CPT

## 2022-01-18 PROCEDURE — 74011000636 HC RX REV CODE- 636: Performed by: SURGERY

## 2022-01-18 RX ADMIN — IOPAMIDOL 100 ML: 755 INJECTION, SOLUTION INTRAVENOUS at 12:37

## 2022-03-18 PROBLEM — I65.23 BILATERAL CAROTID ARTERY STENOSIS: Status: ACTIVE | Noted: 2019-03-21

## 2022-03-19 PROBLEM — R07.9 ACUTE CHEST PAIN: Status: ACTIVE | Noted: 2019-03-21

## 2022-03-19 PROBLEM — R06.02 SOB (SHORTNESS OF BREATH): Status: ACTIVE | Noted: 2019-03-21

## 2022-03-19 PROBLEM — R00.0 SINUS TACHYCARDIA: Status: ACTIVE | Noted: 2019-03-21

## 2022-08-03 ENCOUNTER — TRANSCRIBE ORDER (OUTPATIENT)
Dept: SCHEDULING | Age: 62
End: 2022-08-03

## 2022-08-03 ENCOUNTER — HOSPITAL ENCOUNTER (OUTPATIENT)
Dept: CT IMAGING | Age: 62
Discharge: HOME | End: 2022-08-03
Attending: NURSE PRACTITIONER
Payer: MEDICARE

## 2022-08-03 DIAGNOSIS — I25.5 ISCHEMIC CARDIOMYOPATHY: ICD-10-CM

## 2022-08-03 DIAGNOSIS — I25.5 ISCHEMIC CARDIOMYOPATHY: Primary | ICD-10-CM

## 2022-08-03 PROCEDURE — 74011000636 HC RX REV CODE- 636: Performed by: NURSE PRACTITIONER

## 2022-08-03 PROCEDURE — 74174 CTA ABD&PLVS W/CONTRAST: CPT

## 2022-08-03 PROCEDURE — 71275 CT ANGIOGRAPHY CHEST: CPT

## 2022-08-03 RX ADMIN — IOPAMIDOL 100 ML: 755 INJECTION, SOLUTION INTRAVENOUS at 13:02

## 2022-08-25 ENCOUNTER — HOSPITAL ENCOUNTER (OUTPATIENT)
Dept: PREADMISSION TESTING | Age: 62
Discharge: HOME | End: 2022-08-25
Attending: SURGERY
Payer: MEDICARE

## 2022-08-25 VITALS
DIASTOLIC BLOOD PRESSURE: 61 MMHG | RESPIRATION RATE: 20 BRPM | BODY MASS INDEX: 31.42 KG/M2 | TEMPERATURE: 98.1 F | SYSTOLIC BLOOD PRESSURE: 123 MMHG | OXYGEN SATURATION: 100 % | HEIGHT: 67 IN | WEIGHT: 200.18 LBS | HEART RATE: 88 BPM

## 2022-08-25 LAB
ABO + RH BLD: NORMAL
ALBUMIN SERPL-MCNC: 2.5 G/DL (ref 3.5–5)
ALBUMIN/GLOB SERPL: 0.6 {RATIO} (ref 1.1–2.2)
ALP SERPL-CCNC: 114 U/L (ref 45–117)
ALT SERPL-CCNC: 30 U/L (ref 12–78)
ANION GAP SERPL CALC-SCNC: 7 MMOL/L (ref 5–15)
APPEARANCE UR: ABNORMAL
APTT PPP: 32 SEC (ref 22.1–31)
AST SERPL-CCNC: 29 U/L (ref 15–37)
ATRIAL RATE: 82 BPM
BACTERIA URNS QL MICRO: NEGATIVE /HPF
BILIRUB SERPL-MCNC: 0.6 MG/DL (ref 0.2–1)
BILIRUB UR QL: NEGATIVE
BLOOD GROUP ANTIBODIES SERPL: NORMAL
BUN SERPL-MCNC: 16 MG/DL (ref 6–20)
BUN/CREAT SERPL: 8 (ref 12–20)
CALCIUM SERPL-MCNC: 8.2 MG/DL (ref 8.5–10.1)
CALCULATED P AXIS, ECG09: 2 DEGREES
CALCULATED R AXIS, ECG10: -33 DEGREES
CALCULATED T AXIS, ECG11: 17 DEGREES
CHLORIDE SERPL-SCNC: 108 MMOL/L (ref 97–108)
CO2 SERPL-SCNC: 23 MMOL/L (ref 21–32)
COLOR UR: ABNORMAL
CREAT SERPL-MCNC: 2.05 MG/DL (ref 0.55–1.02)
DIAGNOSIS, 93000: NORMAL
EPITH CASTS URNS QL MICRO: ABNORMAL /LPF
ERYTHROCYTE [DISTWIDTH] IN BLOOD BY AUTOMATED COUNT: 17.2 % (ref 11.5–14.5)
GLOBULIN SER CALC-MCNC: 4.5 G/DL (ref 2–4)
GLUCOSE SERPL-MCNC: 132 MG/DL (ref 65–100)
GLUCOSE UR STRIP.AUTO-MCNC: NEGATIVE MG/DL
HCT VFR BLD AUTO: 36.7 % (ref 35–47)
HGB BLD-MCNC: 11.6 G/DL (ref 11.5–16)
HGB UR QL STRIP: ABNORMAL
HYALINE CASTS URNS QL MICRO: ABNORMAL /LPF (ref 0–2)
INR PPP: 1.2 (ref 0.9–1.1)
KETONES UR QL STRIP.AUTO: NEGATIVE MG/DL
LEUKOCYTE ESTERASE UR QL STRIP.AUTO: ABNORMAL
MCH RBC QN AUTO: 30.8 PG (ref 26–34)
MCHC RBC AUTO-ENTMCNC: 31.6 G/DL (ref 30–36.5)
MCV RBC AUTO: 97.3 FL (ref 80–99)
NITRITE UR QL STRIP.AUTO: NEGATIVE
NRBC # BLD: 0 K/UL (ref 0–0.01)
NRBC BLD-RTO: 0 PER 100 WBC
P-R INTERVAL, ECG05: 150 MS
PH UR STRIP: 5.5 [PH] (ref 5–8)
PLATELET # BLD AUTO: 141 K/UL (ref 150–400)
PMV BLD AUTO: 9.9 FL (ref 8.9–12.9)
POTASSIUM SERPL-SCNC: 3.7 MMOL/L (ref 3.5–5.1)
PROT SERPL-MCNC: 7 G/DL (ref 6.4–8.2)
PROT UR STRIP-MCNC: ABNORMAL MG/DL
PROTHROMBIN TIME: 12.3 SEC (ref 9–11.1)
Q-T INTERVAL, ECG07: 416 MS
QRS DURATION, ECG06: 100 MS
QTC CALCULATION (BEZET), ECG08: 486 MS
RBC # BLD AUTO: 3.77 M/UL (ref 3.8–5.2)
RBC #/AREA URNS HPF: ABNORMAL /HPF (ref 0–5)
SODIUM SERPL-SCNC: 138 MMOL/L (ref 136–145)
SP GR UR REFRACTOMETRY: 1.01
SPECIMEN EXP DATE BLD: NORMAL
THERAPEUTIC RANGE,PTTT: ABNORMAL SECS (ref 58–77)
UA: UC IF INDICATED,UAUC: ABNORMAL
UROBILINOGEN UR QL STRIP.AUTO: 0.2 EU/DL (ref 0.2–1)
VENTRICULAR RATE, ECG03: 82 BPM
WBC # BLD AUTO: 6.4 K/UL (ref 3.6–11)
WBC URNS QL MICRO: ABNORMAL /HPF (ref 0–4)

## 2022-08-25 PROCEDURE — 85027 COMPLETE CBC AUTOMATED: CPT

## 2022-08-25 PROCEDURE — 87086 URINE CULTURE/COLONY COUNT: CPT

## 2022-08-25 PROCEDURE — 85730 THROMBOPLASTIN TIME PARTIAL: CPT

## 2022-08-25 PROCEDURE — 36415 COLL VENOUS BLD VENIPUNCTURE: CPT

## 2022-08-25 PROCEDURE — 80053 COMPREHEN METABOLIC PANEL: CPT

## 2022-08-25 PROCEDURE — 93005 ELECTROCARDIOGRAM TRACING: CPT

## 2022-08-25 PROCEDURE — 86900 BLOOD TYPING SEROLOGIC ABO: CPT

## 2022-08-25 PROCEDURE — 81001 URINALYSIS AUTO W/SCOPE: CPT

## 2022-08-25 PROCEDURE — 87186 SC STD MICRODIL/AGAR DIL: CPT

## 2022-08-25 PROCEDURE — 85610 PROTHROMBIN TIME: CPT

## 2022-08-25 PROCEDURE — 87077 CULTURE AEROBIC IDENTIFY: CPT

## 2022-08-25 RX ORDER — SODIUM CHLORIDE, SODIUM LACTATE, POTASSIUM CHLORIDE, CALCIUM CHLORIDE 600; 310; 30; 20 MG/100ML; MG/100ML; MG/100ML; MG/100ML
25 INJECTION, SOLUTION INTRAVENOUS CONTINUOUS
Status: CANCELLED | OUTPATIENT
Start: 2022-08-29

## 2022-08-25 RX ORDER — QUETIAPINE FUMARATE 200 MG/1
200 TABLET, FILM COATED ORAL
Status: ON HOLD | COMMUNITY
End: 2022-09-01

## 2022-08-25 RX ORDER — ASCORBIC ACID 250 MG
250 TABLET ORAL DAILY
Status: ON HOLD | COMMUNITY
End: 2022-09-01 | Stop reason: SDUPTHER

## 2022-08-25 RX ORDER — CEFAZOLIN SODIUM/WATER 2 G/20 ML
2 SYRINGE (ML) INTRAVENOUS ONCE
Status: CANCELLED | OUTPATIENT
Start: 2022-08-29 | End: 2022-08-29

## 2022-08-25 RX ORDER — QUETIAPINE FUMARATE 100 MG/1
100 TABLET, FILM COATED ORAL DAILY
Status: ON HOLD | COMMUNITY
End: 2022-08-30 | Stop reason: SDUPTHER

## 2022-08-25 NOTE — PERIOP NOTES
[facility]  Preoperative Instructions        Surgery Date 08/29/22       Time of Arrival to be called @  [phone]      1. On the day of your surgery, please report to the Surgical Services Registration Desk and sign in at your designated time. The Surgery Center is located to the right of the Emergency Room. 2. You must have someone with you to drive you home. You should not drive a car for 24 hours following surgery. Please make arrangements for a friend or family member to stay with you for the first 24 hours after your surgery. 3. Do not have anything to eat or drink (including water, gum, mints, coffee, juice) after midnight. ?This may not apply to medications prescribed by your physician. ?(Please note below the special instructions with medications to take the morning of your procedure.)    4. We recommend you do not drink any alcoholic beverages for 24 hours before and after your surgery. 5. Contact your surgeons office for instructions on the following medications: non-steroidal anti-inflammatory drugs (i.e. Advil, Aleve), vitamins, and supplements. (Some surgeons will want you to stop these medications prior to surgery and others may allow you to take them)  **If you are currently taking Plavix, Coumadin, Aspirin and/or other blood-thinning agents, contact your surgeon for instructions. ** Your surgeon will partner with the physician prescribing these medications to determine if it is safe to stop or if you need to continue taking. Please do not stop taking these medications without instructions from your surgeon    6. Wear comfortable clothes. Wear glasses instead of contacts. Do not bring any money or jewelry. Please bring picture ID, insurance card, and any prearranged co-payment or hospital payment. Do not wear make-up, particularly mascara the morning of your surgery. Do not wear nail polish, particularly if you are having foot /hand surgery.   Wear your hair loose or down, no ponytails, buns, sandrine pins or clips. All body piercings must be removed. Please shower with antibacterial soap for three consecutive days before and on the morning of surgery, but do not apply any lotions, powders or deodorants after the shower on the day of surgery. Please use a fresh towels after each shower. Please sleep in clean clothes and change bed linens the night before surgery. Please do not shave for 48 hours prior to surgery. Shaving of the face is acceptable. 7. You should understand that if you do not follow these instructions your surgery may be cancelled. If your physical condition changes (I.e. fever, cold or flu) please contact your surgeon as soon as possible. 8. It is important that you be on time. If a situation occurs where you may be late, please call [phone] (OR Holding Area). 9. If you have any questions and or problems, please call [phone] (Pre-admission Testing). 10. Your surgery time may be subject to change. You will receive a phone call the evening prior if your time changes. 11.  If having outpatient surgery, you must have someone to drive you here, stay with you during the duration of your stay, and to drive you home at time of discharge. Special Instructions: stop Eliquis two days prior to surgery   Stop all vitamins, supplements and NSAIDs 7 days prior to surgery     TAKE ALL OTHER  MEDICATIONS DAY OF SURGERY   I understand a pre-operative phone call will be made to verify my surgery time. In the event that I am not available, I give permission for a message to be left on my answering service and/or with another person?   yes         ___________________      __________   _________    (Signature of Patient)             (Witness)                (Date and Time)

## 2022-08-26 NOTE — PERIOP NOTES
Spoke to Author Cecelia NP in Dr. Eve Mcardle office regarding patient had a recent fall on 8/23/22. She went to the ED ([facility])) and CT was done. The patient also stated she was kept overnight and released the next day. She also has continued the Eliquis as directed. I have requested ED notes and CT results but have not received them. Author Rai states she will notify Dr. Varela Monday.

## 2022-08-26 NOTE — PERIOP NOTES
Late entry    Pt states she fell while at the store on 8/30 w/ a hypotensive episode, hitting head on floor sent to [facility] [address] [place] [address] CT done, d/c'd home in am

## 2022-08-26 NOTE — PERIOP NOTES
Spoke with Monica Saldaña in Dr. Franca Nye office this am.  Patient was seen yesterday in [facility] and urine has reflexed to a urine culture. I explained to her the culture results should be available over the weekend and someone will need to check and treat since the surgery is on Monday. Monica Saldaña states she will let Dr. Christine Pierre know.

## 2022-08-26 NOTE — PERIOP NOTES
Dr. Maci Thornton reviewed [facility], ekg 8/25/22, 3/21/19. Will proceed with planned procedure.

## 2022-08-27 LAB
BACTERIA SPEC CULT: ABNORMAL
CC UR VC: ABNORMAL
SERVICE CMNT-IMP: ABNORMAL

## 2022-08-29 ENCOUNTER — ANESTHESIA (OUTPATIENT)
Dept: SURGERY | Age: 62
DRG: 253 | End: 2022-08-29
Payer: MEDICARE

## 2022-08-29 ENCOUNTER — ANESTHESIA EVENT (OUTPATIENT)
Dept: SURGERY | Age: 62
DRG: 253 | End: 2022-08-29
Payer: MEDICARE

## 2022-08-29 ENCOUNTER — HOSPITAL ENCOUNTER (INPATIENT)
Age: 62
LOS: 3 days | Discharge: HOME HEALTH | DRG: 253 | End: 2022-09-01
Attending: SURGERY | Admitting: SURGERY
Payer: MEDICARE

## 2022-08-29 DIAGNOSIS — I73.9 SEVERE PERIPHERAL ARTERIAL DISEASE (HCC): Primary | ICD-10-CM

## 2022-08-29 PROBLEM — I70.229 CRITICAL LOWER LIMB ISCHEMIA (HCC): Status: ACTIVE | Noted: 2022-08-29

## 2022-08-29 LAB
APPEARANCE UR: ABNORMAL
BACTERIA URNS QL MICRO: NEGATIVE /HPF
BILIRUB UR QL: NEGATIVE
COLOR UR: ABNORMAL
EPITH CASTS URNS QL MICRO: ABNORMAL /LPF
GLUCOSE BLD STRIP.AUTO-MCNC: 163 MG/DL (ref 65–117)
GLUCOSE BLD STRIP.AUTO-MCNC: 88 MG/DL (ref 65–117)
GLUCOSE UR STRIP.AUTO-MCNC: NEGATIVE MG/DL
HGB UR QL STRIP: ABNORMAL
KETONES UR QL STRIP.AUTO: NEGATIVE MG/DL
LEUKOCYTE ESTERASE UR QL STRIP.AUTO: ABNORMAL
NITRITE UR QL STRIP.AUTO: NEGATIVE
PH UR STRIP: 5.5 [PH] (ref 5–8)
PROT UR STRIP-MCNC: 30 MG/DL
RBC #/AREA URNS HPF: ABNORMAL /HPF (ref 0–5)
SERVICE CMNT-IMP: ABNORMAL
SERVICE CMNT-IMP: NORMAL
SP GR UR REFRACTOMETRY: 1.01
UA: UC IF INDICATED,UAUC: ABNORMAL
UROBILINOGEN UR QL STRIP.AUTO: 0.2 EU/DL (ref 0.2–1)
WBC URNS QL MICRO: >100 /HPF (ref 0–4)

## 2022-08-29 PROCEDURE — 74011000250 HC RX REV CODE- 250: Performed by: SURGERY

## 2022-08-29 PROCEDURE — 77030031139 HC SUT VCRL2 J&J -A: Performed by: SURGERY

## 2022-08-29 PROCEDURE — 82962 GLUCOSE BLOOD TEST: CPT

## 2022-08-29 PROCEDURE — 87077 CULTURE AEROBIC IDENTIFY: CPT

## 2022-08-29 PROCEDURE — 2709999900 HC NON-CHARGEABLE SUPPLY: Performed by: SURGERY

## 2022-08-29 PROCEDURE — 77030008684 HC TU ET CUF COVD -B: Performed by: STUDENT IN AN ORGANIZED HEALTH CARE EDUCATION/TRAINING PROGRAM

## 2022-08-29 PROCEDURE — 041Q0JQ BYPASS LEFT ANTERIOR TIBIAL ARTERY TO LOWER EXTREMITY ARTERY WITH SYNTHETIC SUBSTITUTE, OPEN APPROACH: ICD-10-PCS | Performed by: SURGERY

## 2022-08-29 PROCEDURE — 74011250636 HC RX REV CODE- 250/636: Performed by: SURGERY

## 2022-08-29 PROCEDURE — 77030014008 HC SPNG HEMSTAT J&J -C: Performed by: SURGERY

## 2022-08-29 PROCEDURE — 06BP0ZZ EXCISION OF RIGHT SAPHENOUS VEIN, OPEN APPROACH: ICD-10-PCS | Performed by: SURGERY

## 2022-08-29 PROCEDURE — 77030038692 HC WND DEB SYS IRMX -B: Performed by: SURGERY

## 2022-08-29 PROCEDURE — 74011000258 HC RX REV CODE- 258: Performed by: SURGERY

## 2022-08-29 PROCEDURE — 76010000176 HC OR TIME 4.5 TO 5 HR INTENSV-TIER 1: Performed by: SURGERY

## 2022-08-29 PROCEDURE — 87086 URINE CULTURE/COLONY COUNT: CPT

## 2022-08-29 PROCEDURE — 74011250636 HC RX REV CODE- 250/636: Performed by: STUDENT IN AN ORGANIZED HEALTH CARE EDUCATION/TRAINING PROGRAM

## 2022-08-29 PROCEDURE — 77030026438 HC STYL ET INTUB CARD -A: Performed by: STUDENT IN AN ORGANIZED HEALTH CARE EDUCATION/TRAINING PROGRAM

## 2022-08-29 PROCEDURE — 74011000250 HC RX REV CODE- 250: Performed by: NURSE ANESTHETIST, CERTIFIED REGISTERED

## 2022-08-29 PROCEDURE — 77030002996 HC SUT SLK J&J -A: Performed by: SURGERY

## 2022-08-29 PROCEDURE — 77030002987 HC SUT PROL J&J -B: Performed by: SURGERY

## 2022-08-29 PROCEDURE — 74011000250 HC RX REV CODE- 250: Performed by: STUDENT IN AN ORGANIZED HEALTH CARE EDUCATION/TRAINING PROGRAM

## 2022-08-29 PROCEDURE — 76210000006 HC OR PH I REC 0.5 TO 1 HR: Performed by: SURGERY

## 2022-08-29 PROCEDURE — 65270000046 HC RM TELEMETRY

## 2022-08-29 PROCEDURE — 74011250637 HC RX REV CODE- 250/637: Performed by: SURGERY

## 2022-08-29 PROCEDURE — 77030013838 HC OCCL INT FLRST BAXT -B: Performed by: SURGERY

## 2022-08-29 PROCEDURE — 87186 SC STD MICRODIL/AGAR DIL: CPT

## 2022-08-29 PROCEDURE — 77030002986 HC SUT PROL J&J -A: Performed by: SURGERY

## 2022-08-29 PROCEDURE — 76060000040 HC ANESTHESIA 4.5 TO 5 HR: Performed by: SURGERY

## 2022-08-29 PROCEDURE — 81001 URINALYSIS AUTO W/SCOPE: CPT

## 2022-08-29 PROCEDURE — 04CW0ZZ EXTIRPATION OF MATTER FROM LEFT FOOT ARTERY, OPEN APPROACH: ICD-10-PCS | Performed by: SURGERY

## 2022-08-29 PROCEDURE — 77030005513 HC CATH URETH FOL11 MDII -B: Performed by: SURGERY

## 2022-08-29 PROCEDURE — 74011250636 HC RX REV CODE- 250/636: Performed by: NURSE ANESTHETIST, CERTIFIED REGISTERED

## 2022-08-29 PROCEDURE — 77030008463 HC STPLR SKN PROX J&J -B: Performed by: SURGERY

## 2022-08-29 PROCEDURE — 77030010938 HC CLP LIG TELE -A: Performed by: SURGERY

## 2022-08-29 PROCEDURE — C1757 CATH, THROMBECTOMY/EMBOLECT: HCPCS | Performed by: SURGERY

## 2022-08-29 RX ORDER — ASPIRIN 81 MG/1
81 TABLET ORAL DAILY
Status: DISCONTINUED | OUTPATIENT
Start: 2022-08-30 | End: 2022-09-01 | Stop reason: HOSPADM

## 2022-08-29 RX ORDER — HEPARIN SODIUM 1000 [USP'U]/ML
INJECTION, SOLUTION INTRAVENOUS; SUBCUTANEOUS AS NEEDED
Status: DISCONTINUED | OUTPATIENT
Start: 2022-08-29 | End: 2022-08-29 | Stop reason: HOSPADM

## 2022-08-29 RX ORDER — OXYCODONE HYDROCHLORIDE 5 MG/1
5 TABLET ORAL AS NEEDED
Status: DISCONTINUED | OUTPATIENT
Start: 2022-08-29 | End: 2022-08-29 | Stop reason: HOSPADM

## 2022-08-29 RX ORDER — HYDROMORPHONE HYDROCHLORIDE 1 MG/ML
0.2 INJECTION, SOLUTION INTRAMUSCULAR; INTRAVENOUS; SUBCUTANEOUS
Status: DISCONTINUED | OUTPATIENT
Start: 2022-08-29 | End: 2022-08-29 | Stop reason: HOSPADM

## 2022-08-29 RX ORDER — PANTOPRAZOLE SODIUM 40 MG/1
40 TABLET, DELAYED RELEASE ORAL 2 TIMES DAILY
Status: DISCONTINUED | OUTPATIENT
Start: 2022-08-29 | End: 2022-09-01 | Stop reason: HOSPADM

## 2022-08-29 RX ORDER — SODIUM CHLORIDE, SODIUM LACTATE, POTASSIUM CHLORIDE, CALCIUM CHLORIDE 600; 310; 30; 20 MG/100ML; MG/100ML; MG/100ML; MG/100ML
25 INJECTION, SOLUTION INTRAVENOUS CONTINUOUS
Status: DISCONTINUED | OUTPATIENT
Start: 2022-08-29 | End: 2022-08-29 | Stop reason: HOSPADM

## 2022-08-29 RX ORDER — NEOSTIGMINE METHYLSULFATE 1 MG/ML
INJECTION, SOLUTION INTRAVENOUS AS NEEDED
Status: DISCONTINUED | OUTPATIENT
Start: 2022-08-29 | End: 2022-08-29 | Stop reason: HOSPADM

## 2022-08-29 RX ORDER — ADHESIVE BANDAGE
30 BANDAGE TOPICAL DAILY PRN
Status: DISCONTINUED | OUTPATIENT
Start: 2022-08-29 | End: 2022-09-01 | Stop reason: HOSPADM

## 2022-08-29 RX ORDER — LANOLIN ALCOHOL/MO/W.PET/CERES
400 CREAM (GRAM) TOPICAL DAILY
Status: DISCONTINUED | OUTPATIENT
Start: 2022-08-30 | End: 2022-09-01 | Stop reason: HOSPADM

## 2022-08-29 RX ORDER — FENTANYL CITRATE 50 UG/ML
INJECTION, SOLUTION INTRAMUSCULAR; INTRAVENOUS AS NEEDED
Status: DISCONTINUED | OUTPATIENT
Start: 2022-08-29 | End: 2022-08-29 | Stop reason: HOSPADM

## 2022-08-29 RX ORDER — FENTANYL CITRATE 50 UG/ML
25 INJECTION, SOLUTION INTRAMUSCULAR; INTRAVENOUS
Status: DISCONTINUED | OUTPATIENT
Start: 2022-08-29 | End: 2022-08-29 | Stop reason: HOSPADM

## 2022-08-29 RX ORDER — EPHEDRINE SULFATE/0.9% NACL/PF 50 MG/5 ML
SYRINGE (ML) INTRAVENOUS AS NEEDED
Status: DISCONTINUED | OUTPATIENT
Start: 2022-08-29 | End: 2022-08-29 | Stop reason: HOSPADM

## 2022-08-29 RX ORDER — PROTAMINE SULFATE 10 MG/ML
INJECTION, SOLUTION INTRAVENOUS AS NEEDED
Status: DISCONTINUED | OUTPATIENT
Start: 2022-08-29 | End: 2022-08-29 | Stop reason: HOSPADM

## 2022-08-29 RX ORDER — ROSUVASTATIN CALCIUM 40 MG/1
40 TABLET, COATED ORAL
Status: DISCONTINUED | OUTPATIENT
Start: 2022-08-29 | End: 2022-09-01 | Stop reason: HOSPADM

## 2022-08-29 RX ORDER — QUETIAPINE FUMARATE 100 MG/1
200 TABLET, FILM COATED ORAL
Status: DISCONTINUED | OUTPATIENT
Start: 2022-08-29 | End: 2022-09-01 | Stop reason: HOSPADM

## 2022-08-29 RX ORDER — FACIAL-BODY WIPES
10 EACH TOPICAL DAILY PRN
Status: DISCONTINUED | OUTPATIENT
Start: 2022-08-29 | End: 2022-09-01 | Stop reason: HOSPADM

## 2022-08-29 RX ORDER — ROCURONIUM BROMIDE 10 MG/ML
INJECTION, SOLUTION INTRAVENOUS AS NEEDED
Status: DISCONTINUED | OUTPATIENT
Start: 2022-08-29 | End: 2022-08-29 | Stop reason: HOSPADM

## 2022-08-29 RX ORDER — SODIUM CHLORIDE 9 MG/ML
25 INJECTION, SOLUTION INTRAVENOUS CONTINUOUS
Status: DISCONTINUED | OUTPATIENT
Start: 2022-08-29 | End: 2022-09-01 | Stop reason: HOSPADM

## 2022-08-29 RX ORDER — ONDANSETRON 2 MG/ML
INJECTION INTRAMUSCULAR; INTRAVENOUS AS NEEDED
Status: DISCONTINUED | OUTPATIENT
Start: 2022-08-29 | End: 2022-08-29 | Stop reason: HOSPADM

## 2022-08-29 RX ORDER — BUSPIRONE HYDROCHLORIDE 5 MG/1
10 TABLET ORAL 3 TIMES DAILY
Status: DISCONTINUED | OUTPATIENT
Start: 2022-08-29 | End: 2022-08-30

## 2022-08-29 RX ORDER — QUETIAPINE FUMARATE 100 MG/1
100 TABLET, FILM COATED ORAL DAILY
Status: DISCONTINUED | OUTPATIENT
Start: 2022-08-30 | End: 2022-08-30

## 2022-08-29 RX ORDER — MORPHINE SULFATE 2 MG/ML
2 INJECTION, SOLUTION INTRAMUSCULAR; INTRAVENOUS
Status: DISCONTINUED | OUTPATIENT
Start: 2022-08-29 | End: 2022-09-01 | Stop reason: HOSPADM

## 2022-08-29 RX ORDER — DEXAMETHASONE SODIUM PHOSPHATE 4 MG/ML
INJECTION, SOLUTION INTRA-ARTICULAR; INTRALESIONAL; INTRAMUSCULAR; INTRAVENOUS; SOFT TISSUE AS NEEDED
Status: DISCONTINUED | OUTPATIENT
Start: 2022-08-29 | End: 2022-08-29 | Stop reason: HOSPADM

## 2022-08-29 RX ORDER — OXYCODONE AND ACETAMINOPHEN 5; 325 MG/1; MG/1
1 TABLET ORAL
Status: DISCONTINUED | OUTPATIENT
Start: 2022-08-29 | End: 2022-09-01 | Stop reason: HOSPADM

## 2022-08-29 RX ORDER — OXYCODONE AND ACETAMINOPHEN 5; 325 MG/1; MG/1
2 TABLET ORAL
Status: DISCONTINUED | OUTPATIENT
Start: 2022-08-29 | End: 2022-09-01 | Stop reason: HOSPADM

## 2022-08-29 RX ORDER — ACETAMINOPHEN 325 MG/1
650 TABLET ORAL
Status: DISCONTINUED | OUTPATIENT
Start: 2022-08-29 | End: 2022-09-01 | Stop reason: HOSPADM

## 2022-08-29 RX ORDER — GLYCOPYRROLATE 0.2 MG/ML
INJECTION INTRAMUSCULAR; INTRAVENOUS AS NEEDED
Status: DISCONTINUED | OUTPATIENT
Start: 2022-08-29 | End: 2022-08-29 | Stop reason: HOSPADM

## 2022-08-29 RX ORDER — MIDAZOLAM HYDROCHLORIDE 1 MG/ML
INJECTION, SOLUTION INTRAMUSCULAR; INTRAVENOUS AS NEEDED
Status: DISCONTINUED | OUTPATIENT
Start: 2022-08-29 | End: 2022-08-29 | Stop reason: HOSPADM

## 2022-08-29 RX ORDER — METOPROLOL TARTRATE 25 MG/1
25 TABLET, FILM COATED ORAL EVERY 12 HOURS
Status: DISCONTINUED | OUTPATIENT
Start: 2022-08-29 | End: 2022-09-01 | Stop reason: HOSPADM

## 2022-08-29 RX ORDER — HYDROMORPHONE HYDROCHLORIDE 2 MG/ML
INJECTION, SOLUTION INTRAMUSCULAR; INTRAVENOUS; SUBCUTANEOUS AS NEEDED
Status: DISCONTINUED | OUTPATIENT
Start: 2022-08-29 | End: 2022-08-29 | Stop reason: HOSPADM

## 2022-08-29 RX ORDER — ONDANSETRON 2 MG/ML
4 INJECTION INTRAMUSCULAR; INTRAVENOUS AS NEEDED
Status: DISCONTINUED | OUTPATIENT
Start: 2022-08-29 | End: 2022-08-29 | Stop reason: HOSPADM

## 2022-08-29 RX ORDER — PROPOFOL 10 MG/ML
INJECTION, EMULSION INTRAVENOUS AS NEEDED
Status: DISCONTINUED | OUTPATIENT
Start: 2022-08-29 | End: 2022-08-29 | Stop reason: HOSPADM

## 2022-08-29 RX ORDER — DULOXETIN HYDROCHLORIDE 30 MG/1
30 CAPSULE, DELAYED RELEASE ORAL 2 TIMES DAILY
Status: DISCONTINUED | OUTPATIENT
Start: 2022-08-29 | End: 2022-09-01 | Stop reason: HOSPADM

## 2022-08-29 RX ORDER — LIDOCAINE HYDROCHLORIDE 20 MG/ML
INJECTION, SOLUTION EPIDURAL; INFILTRATION; INTRACAUDAL; PERINEURAL AS NEEDED
Status: DISCONTINUED | OUTPATIENT
Start: 2022-08-29 | End: 2022-08-29 | Stop reason: HOSPADM

## 2022-08-29 RX ORDER — LANOLIN ALCOHOL/MO/W.PET/CERES
325 CREAM (GRAM) TOPICAL 2 TIMES DAILY WITH MEALS
Status: DISCONTINUED | OUTPATIENT
Start: 2022-08-30 | End: 2022-09-01 | Stop reason: HOSPADM

## 2022-08-29 RX ORDER — MORPHINE SULFATE 4 MG/ML
4 INJECTION INTRAVENOUS
Status: DISCONTINUED | OUTPATIENT
Start: 2022-08-29 | End: 2022-08-30

## 2022-08-29 RX ADMIN — SODIUM CHLORIDE 50 ML/HR: 9 INJECTION, SOLUTION INTRAVENOUS at 20:49

## 2022-08-29 RX ADMIN — OXYCODONE AND ACETAMINOPHEN 1 TABLET: 5; 325 TABLET ORAL at 22:06

## 2022-08-29 RX ADMIN — PROTAMINE SULFATE 25 MG: 10 INJECTION, SOLUTION INTRAVENOUS at 19:42

## 2022-08-29 RX ADMIN — FENTANYL CITRATE 25 MCG: 50 INJECTION, SOLUTION INTRAMUSCULAR; INTRAVENOUS at 15:29

## 2022-08-29 RX ADMIN — FENTANYL CITRATE 25 MCG: 50 INJECTION, SOLUTION INTRAMUSCULAR; INTRAVENOUS at 16:17

## 2022-08-29 RX ADMIN — QUETIAPINE FUMARATE 200 MG: 100 TABLET ORAL at 21:40

## 2022-08-29 RX ADMIN — Medication 10 MG: at 18:19

## 2022-08-29 RX ADMIN — DULOXETINE HYDROCHLORIDE 30 MG: 30 CAPSULE, DELAYED RELEASE ORAL at 21:39

## 2022-08-29 RX ADMIN — LIDOCAINE HYDROCHLORIDE 80 MG: 20 INJECTION, SOLUTION EPIDURAL; INFILTRATION; INTRACAUDAL; PERINEURAL at 15:42

## 2022-08-29 RX ADMIN — HYDROMORPHONE HYDROCHLORIDE 0.2 MG: 2 INJECTION, SOLUTION INTRAMUSCULAR; INTRAVENOUS; SUBCUTANEOUS at 18:11

## 2022-08-29 RX ADMIN — PROPOFOL 70 MG: 10 INJECTION, EMULSION INTRAVENOUS at 15:42

## 2022-08-29 RX ADMIN — ROCURONIUM BROMIDE 20 MG: 10 INJECTION INTRAVENOUS at 18:34

## 2022-08-29 RX ADMIN — FENTANYL CITRATE 25 MCG: 50 INJECTION, SOLUTION INTRAMUSCULAR; INTRAVENOUS at 17:46

## 2022-08-29 RX ADMIN — ROCURONIUM BROMIDE 10 MG: 10 INJECTION INTRAVENOUS at 17:07

## 2022-08-29 RX ADMIN — WATER 2 G: 1 INJECTION INTRAMUSCULAR; INTRAVENOUS; SUBCUTANEOUS at 15:52

## 2022-08-29 RX ADMIN — SODIUM CHLORIDE, POTASSIUM CHLORIDE, SODIUM LACTATE AND CALCIUM CHLORIDE 25 ML/HR: 600; 310; 30; 20 INJECTION, SOLUTION INTRAVENOUS at 14:55

## 2022-08-29 RX ADMIN — BUSPIRONE HYDROCHLORIDE 10 MG: 5 TABLET ORAL at 21:39

## 2022-08-29 RX ADMIN — HYDROMORPHONE HYDROCHLORIDE 0.2 MG: 2 INJECTION, SOLUTION INTRAMUSCULAR; INTRAVENOUS; SUBCUTANEOUS at 18:34

## 2022-08-29 RX ADMIN — Medication 4 MG: at 19:41

## 2022-08-29 RX ADMIN — Medication 5 MG: at 18:14

## 2022-08-29 RX ADMIN — ROCURONIUM BROMIDE 10 MG: 10 INJECTION INTRAVENOUS at 19:24

## 2022-08-29 RX ADMIN — GLYCOPYRROLATE 0.7 MG: 0.2 INJECTION, SOLUTION INTRAMUSCULAR; INTRAVENOUS at 19:41

## 2022-08-29 RX ADMIN — DEXAMETHASONE SODIUM PHOSPHATE 4 MG: 4 INJECTION, SOLUTION INTRAMUSCULAR; INTRAVENOUS at 15:50

## 2022-08-29 RX ADMIN — ONDANSETRON HYDROCHLORIDE 4 MG: 2 INJECTION, SOLUTION INTRAMUSCULAR; INTRAVENOUS at 15:29

## 2022-08-29 RX ADMIN — SODIUM CHLORIDE 20 MCG/MIN: 900 INJECTION, SOLUTION INTRAVENOUS at 18:19

## 2022-08-29 RX ADMIN — PANTOPRAZOLE SODIUM 40 MG: 40 TABLET, DELAYED RELEASE ORAL at 21:39

## 2022-08-29 RX ADMIN — ROCURONIUM BROMIDE 40 MG: 10 INJECTION INTRAVENOUS at 15:42

## 2022-08-29 RX ADMIN — Medication 3 AMPULE: at 14:54

## 2022-08-29 RX ADMIN — FENTANYL CITRATE 25 MCG: 50 INJECTION, SOLUTION INTRAMUSCULAR; INTRAVENOUS at 17:07

## 2022-08-29 RX ADMIN — SODIUM CHLORIDE, POTASSIUM CHLORIDE, SODIUM LACTATE AND CALCIUM CHLORIDE: 600; 310; 30; 20 INJECTION, SOLUTION INTRAVENOUS at 18:12

## 2022-08-29 RX ADMIN — Medication 5 MG: at 18:18

## 2022-08-29 RX ADMIN — ROSUVASTATIN CALCIUM 40 MG: 40 TABLET, FILM COATED ORAL at 21:58

## 2022-08-29 RX ADMIN — MEROPENEM 1 G: 1 INJECTION, POWDER, FOR SOLUTION INTRAVENOUS at 16:01

## 2022-08-29 RX ADMIN — HEPARIN SODIUM 2000 UNITS: 1000 INJECTION, SOLUTION INTRAVENOUS; SUBCUTANEOUS at 19:05

## 2022-08-29 RX ADMIN — Medication 10 MG: at 18:16

## 2022-08-29 RX ADMIN — PROTAMINE SULFATE 25 MG: 10 INJECTION, SOLUTION INTRAVENOUS at 19:48

## 2022-08-29 RX ADMIN — MIDAZOLAM HYDROCHLORIDE 2 MG: 1 INJECTION, SOLUTION INTRAMUSCULAR; INTRAVENOUS at 15:29

## 2022-08-29 RX ADMIN — Medication 5 MG: at 18:34

## 2022-08-29 RX ADMIN — HEPARIN SODIUM 7500 UNITS: 1000 INJECTION, SOLUTION INTRAVENOUS; SUBCUTANEOUS at 18:09

## 2022-08-29 NOTE — ANESTHESIA PROCEDURE NOTES
Arterial Line Placement    Start time: 8/29/2022 3:00 PM  End time: 8/29/2022 3:23 PM  Performed by: Jenny Colvin MD  Authorized by: Jenny Colvin MD     Pre-Procedure  Indications:  Arterial pressure monitoring  Preanesthetic Checklist: patient identified, risks and benefits discussed, anesthesia consent, site marked, patient being monitored, timeout performed and patient being monitored    Timeout Time: 14:59 EDT      Procedure:   Prep:  Chlorhexidine  Seldinger Technique?: Yes    Orientation:  Right  Location:  Brachical artery  Catheter size:  20 G  Number of attempts:  3    Assessment:   Post-procedure:  Line secured and sterile dressing applied  Patient Tolerance:  Patient tolerated the procedure well with no immediate complications

## 2022-08-29 NOTE — ANESTHESIA PREPROCEDURE EVALUATION
Relevant Problems   No relevant active problems       Anesthetic History   No history of anesthetic complications            Review of Systems / Medical History  Patient summary reviewed and pertinent labs reviewed    Pulmonary                Comments: Former smoker. Quit 1982   Neuro/Psych       CVA  TIA and psychiatric history (anxiety)    Comments: Hx of stable brain aneurysm. No intervention. Cardiovascular    Hypertension          CAD and hyperlipidemia      Comments: Hx of DVT    EKG (08/2022): Sinus rhythm with fusion complexes   Left axis deviation    Carotid doppler: Consistent with occlusion of the right internal carotid and 50-69% stenosis of the left internal carotid. GI/Hepatic/Renal     GERD    Renal disease: CRI  PUD     Endo/Other    Diabetes: well controlled    Obesity     Other Findings   Comments: Thrombocytopenia          Physical Exam    Airway  Mallampati: II  TM Distance: > 6 cm  Neck ROM: normal range of motion   Mouth opening: Normal     Cardiovascular  Regular rate and rhythm,  S1 and S2 normal,  no murmur, click, rub, or gallop  Rhythm: regular  Rate: normal         Dental    Dentition: Full upper dentures and Full lower dentures     Pulmonary  Breath sounds clear to auscultation               Abdominal  GI exam deferred       Other Findings            Anesthetic Plan    ASA: 3  Anesthesia type: general    Monitoring Plan: BIS      Induction: Intravenous  Anesthetic plan and risks discussed with: Patient      Bilateral carotid stenosis and brain aneurysm. Avoid severe hypo/hypertension.  MAP goal 70-80

## 2022-08-29 NOTE — PERIOP NOTES
Irrisept Wound Debridement and Cleansing System  Ref: Gwyn Anchors: [id number] LOT: [id number] Expiration Date: 05/31/2024

## 2022-08-30 LAB
ANION GAP SERPL CALC-SCNC: 8 MMOL/L (ref 5–15)
BACTERIA SPEC CULT: NORMAL
BACTERIA SPEC CULT: NORMAL
BUN SERPL-MCNC: 14 MG/DL (ref 6–20)
BUN/CREAT SERPL: 7 (ref 12–20)
CALCIUM SERPL-MCNC: 7.6 MG/DL (ref 8.5–10.1)
CHLORIDE SERPL-SCNC: 108 MMOL/L (ref 97–108)
CO2 SERPL-SCNC: 21 MMOL/L (ref 21–32)
CREAT SERPL-MCNC: 1.87 MG/DL (ref 0.55–1.02)
ERYTHROCYTE [DISTWIDTH] IN BLOOD BY AUTOMATED COUNT: 16.9 % (ref 11.5–14.5)
EST. AVERAGE GLUCOSE BLD GHB EST-MCNC: 137 MG/DL
GLUCOSE BLD STRIP.AUTO-MCNC: 163 MG/DL (ref 65–117)
GLUCOSE BLD STRIP.AUTO-MCNC: 164 MG/DL (ref 65–117)
GLUCOSE BLD STRIP.AUTO-MCNC: 226 MG/DL (ref 65–117)
GLUCOSE SERPL-MCNC: 250 MG/DL (ref 65–100)
HBA1C MFR BLD: 6.4 % (ref 4–5.6)
HCT VFR BLD AUTO: 33.6 % (ref 35–47)
HGB BLD-MCNC: 10.5 G/DL (ref 11.5–16)
MAGNESIUM SERPL-MCNC: 2 MG/DL (ref 1.6–2.4)
MCH RBC QN AUTO: 30.9 PG (ref 26–34)
MCHC RBC AUTO-ENTMCNC: 31.3 G/DL (ref 30–36.5)
MCV RBC AUTO: 98.8 FL (ref 80–99)
NRBC # BLD: 0 K/UL (ref 0–0.01)
NRBC BLD-RTO: 0 PER 100 WBC
PLATELET # BLD AUTO: 131 K/UL (ref 150–400)
PMV BLD AUTO: 10.6 FL (ref 8.9–12.9)
POTASSIUM SERPL-SCNC: 4.2 MMOL/L (ref 3.5–5.1)
RBC # BLD AUTO: 3.4 M/UL (ref 3.8–5.2)
SERVICE CMNT-IMP: ABNORMAL
SERVICE CMNT-IMP: NORMAL
SODIUM SERPL-SCNC: 137 MMOL/L (ref 136–145)
WBC # BLD AUTO: 11.2 K/UL (ref 3.6–11)

## 2022-08-30 PROCEDURE — 85027 COMPLETE CBC AUTOMATED: CPT

## 2022-08-30 PROCEDURE — 97530 THERAPEUTIC ACTIVITIES: CPT

## 2022-08-30 PROCEDURE — 97535 SELF CARE MNGMENT TRAINING: CPT

## 2022-08-30 PROCEDURE — 83735 ASSAY OF MAGNESIUM: CPT

## 2022-08-30 PROCEDURE — 83036 HEMOGLOBIN GLYCOSYLATED A1C: CPT

## 2022-08-30 PROCEDURE — 74011250637 HC RX REV CODE- 250/637: Performed by: SURGERY

## 2022-08-30 PROCEDURE — 77010033678 HC OXYGEN DAILY

## 2022-08-30 PROCEDURE — 97165 OT EVAL LOW COMPLEX 30 MIN: CPT

## 2022-08-30 PROCEDURE — 97162 PT EVAL MOD COMPLEX 30 MIN: CPT

## 2022-08-30 PROCEDURE — 36415 COLL VENOUS BLD VENIPUNCTURE: CPT

## 2022-08-30 PROCEDURE — 74011250636 HC RX REV CODE- 250/636: Performed by: SURGERY

## 2022-08-30 PROCEDURE — 65270000046 HC RM TELEMETRY

## 2022-08-30 PROCEDURE — 74011000258 HC RX REV CODE- 258: Performed by: SURGERY

## 2022-08-30 PROCEDURE — 82962 GLUCOSE BLOOD TEST: CPT

## 2022-08-30 PROCEDURE — 80048 BASIC METABOLIC PNL TOTAL CA: CPT

## 2022-08-30 PROCEDURE — 51798 US URINE CAPACITY MEASURE: CPT

## 2022-08-30 PROCEDURE — 74011636637 HC RX REV CODE- 636/637: Performed by: NURSE PRACTITIONER

## 2022-08-30 PROCEDURE — 74011250637 HC RX REV CODE- 250/637: Performed by: NURSE PRACTITIONER

## 2022-08-30 RX ORDER — QUETIAPINE FUMARATE 25 MG/1
50 TABLET, FILM COATED ORAL DAILY
Status: DISCONTINUED | OUTPATIENT
Start: 2022-08-31 | End: 2022-09-01 | Stop reason: HOSPADM

## 2022-08-30 RX ORDER — BUSPIRONE HYDROCHLORIDE 5 MG/1
10 TABLET ORAL 2 TIMES DAILY
Status: DISCONTINUED | OUTPATIENT
Start: 2022-08-30 | End: 2022-09-01 | Stop reason: HOSPADM

## 2022-08-30 RX ORDER — INSULIN GLARGINE 100 [IU]/ML
6 INJECTION, SOLUTION SUBCUTANEOUS
Status: DISCONTINUED | OUTPATIENT
Start: 2022-08-30 | End: 2022-09-01 | Stop reason: HOSPADM

## 2022-08-30 RX ORDER — BUSPIRONE HYDROCHLORIDE 10 MG/1
10 TABLET ORAL 2 TIMES DAILY
Qty: 60 TABLET | Refills: 0 | Status: SHIPPED
Start: 2022-08-30 | End: 2022-09-01 | Stop reason: SDUPTHER

## 2022-08-30 RX ORDER — AMLODIPINE BESYLATE 5 MG/1
5 TABLET ORAL DAILY
COMMUNITY
End: 2022-09-01

## 2022-08-30 RX ORDER — MAGNESIUM SULFATE 100 %
4 CRYSTALS MISCELLANEOUS AS NEEDED
Status: DISCONTINUED | OUTPATIENT
Start: 2022-08-30 | End: 2022-09-01 | Stop reason: HOSPADM

## 2022-08-30 RX ORDER — DEXTROSE MONOHYDRATE 100 MG/ML
0-250 INJECTION, SOLUTION INTRAVENOUS AS NEEDED
Status: DISCONTINUED | OUTPATIENT
Start: 2022-08-30 | End: 2022-09-01 | Stop reason: HOSPADM

## 2022-08-30 RX ORDER — QUETIAPINE FUMARATE 50 MG/1
50 TABLET, FILM COATED ORAL DAILY
Qty: 30 TABLET | Refills: 0 | Status: SHIPPED
Start: 2022-08-30 | End: 2022-09-01

## 2022-08-30 RX ORDER — MIDODRINE HYDROCHLORIDE 5 MG/1
5 TABLET ORAL
Status: DISCONTINUED | OUTPATIENT
Start: 2022-08-30 | End: 2022-09-01 | Stop reason: HOSPADM

## 2022-08-30 RX ORDER — INSULIN LISPRO 100 [IU]/ML
INJECTION, SOLUTION INTRAVENOUS; SUBCUTANEOUS
Status: DISCONTINUED | OUTPATIENT
Start: 2022-08-30 | End: 2022-09-01 | Stop reason: HOSPADM

## 2022-08-30 RX ADMIN — MIDODRINE HYDROCHLORIDE 5 MG: 5 TABLET ORAL at 12:47

## 2022-08-30 RX ADMIN — BUSPIRONE HYDROCHLORIDE 10 MG: 5 TABLET ORAL at 17:11

## 2022-08-30 RX ADMIN — PANTOPRAZOLE SODIUM 40 MG: 40 TABLET, DELAYED RELEASE ORAL at 17:11

## 2022-08-30 RX ADMIN — DULOXETINE HYDROCHLORIDE 30 MG: 30 CAPSULE, DELAYED RELEASE ORAL at 08:47

## 2022-08-30 RX ADMIN — Medication 400 MG: at 08:47

## 2022-08-30 RX ADMIN — OXYCODONE AND ACETAMINOPHEN 2 TABLET: 5; 325 TABLET ORAL at 19:19

## 2022-08-30 RX ADMIN — ROSUVASTATIN CALCIUM 40 MG: 40 TABLET, FILM COATED ORAL at 21:34

## 2022-08-30 RX ADMIN — OXYCODONE AND ACETAMINOPHEN 2 TABLET: 5; 325 TABLET ORAL at 02:15

## 2022-08-30 RX ADMIN — Medication 3 UNITS: at 12:47

## 2022-08-30 RX ADMIN — MEROPENEM 1 G: 1 INJECTION, POWDER, FOR SOLUTION INTRAVENOUS at 17:11

## 2022-08-30 RX ADMIN — QUETIAPINE FUMARATE 100 MG: 100 TABLET ORAL at 08:47

## 2022-08-30 RX ADMIN — PHENYLEPHRINE HYDROCHLORIDE 20 MCG/MIN: 10 INJECTION INTRAVENOUS at 23:04

## 2022-08-30 RX ADMIN — OXYCODONE AND ACETAMINOPHEN 2 TABLET: 5; 325 TABLET ORAL at 07:25

## 2022-08-30 RX ADMIN — OXYCODONE AND ACETAMINOPHEN 2 TABLET: 5; 325 TABLET ORAL at 12:49

## 2022-08-30 RX ADMIN — Medication 2 UNITS: at 18:15

## 2022-08-30 RX ADMIN — FERROUS SULFATE TAB 325 MG (65 MG ELEMENTAL FE) 325 MG: 325 (65 FE) TAB at 08:47

## 2022-08-30 RX ADMIN — INSULIN GLARGINE 6 UNITS: 100 INJECTION, SOLUTION SUBCUTANEOUS at 21:30

## 2022-08-30 RX ADMIN — MEROPENEM 1 G: 1 INJECTION, POWDER, FOR SOLUTION INTRAVENOUS at 03:46

## 2022-08-30 RX ADMIN — DULOXETINE HYDROCHLORIDE 30 MG: 30 CAPSULE, DELAYED RELEASE ORAL at 17:11

## 2022-08-30 RX ADMIN — PHENYLEPHRINE HYDROCHLORIDE 10 MCG/MIN: 10 INJECTION INTRAVENOUS at 00:33

## 2022-08-30 RX ADMIN — QUETIAPINE FUMARATE 200 MG: 100 TABLET ORAL at 21:30

## 2022-08-30 RX ADMIN — MIDODRINE HYDROCHLORIDE 5 MG: 5 TABLET ORAL at 08:47

## 2022-08-30 RX ADMIN — FERROUS SULFATE TAB 325 MG (65 MG ELEMENTAL FE) 325 MG: 325 (65 FE) TAB at 17:11

## 2022-08-30 RX ADMIN — MIDODRINE HYDROCHLORIDE 5 MG: 5 TABLET ORAL at 17:11

## 2022-08-30 RX ADMIN — ASPIRIN 81 MG: 81 TABLET, COATED ORAL at 08:47

## 2022-08-30 RX ADMIN — PANTOPRAZOLE SODIUM 40 MG: 40 TABLET, DELAYED RELEASE ORAL at 08:47

## 2022-08-30 RX ADMIN — BUSPIRONE HYDROCHLORIDE 10 MG: 5 TABLET ORAL at 08:47

## 2022-08-30 NOTE — PROGRESS NOTES
Problem: Mobility Impaired (Adult and Pediatric)  Goal: *Acute Goals and Plan of Care (Insert Text)  Description: FUNCTIONAL STATUS PRIOR TO ADMISSION: Most recently, no device use per pt, and in the past, RW and/or SPC use. Pt reports recent falls however secondary to syncopal episodes and \"furniture walking. \"      HOME SUPPORT PRIOR TO ADMISSION: The patient lived with her son but did not require assist per pt report. Physical Therapy Goals  Initiated 8/30/2022  1. Patient will move from supine to sit and sit to supine , scoot up and down, and roll side to side in bed with modified independence within 7 day(s). 2.  Patient will transfer from bed to chair and chair to bed with supervision/set-up using the least restrictive device within 7 day(s). 3.  Patient will perform sit to stand with supervision/set-up within 7 day(s). 4.  Patient will ambulate with supervision/set-up for 200 feet with the least restrictive device within 7 day(s). 5.  Patient will ascend/descend 4 stairs with handrail(s) with minimal assistance/contact guard assist within 7 day(s). Outcome: Not Met        PHYSICAL THERAPY EVALUATION  Patient: Brendon Smith (82 y.o. female)  Date: 8/30/2022  Primary Diagnosis: Critical lower limb ischemia ([facility] [address] [I70.229]  Procedure(s) (LRB):  LEFT ANTERIOR TIBIAL TO DORSAL PEDAL BYPASS WITH VEIN (Left) 1 Day Post-Op   Precautions:   Fall, Contact ; goal is MAP of 70-80      ASSESSMENT  Nurse clears pt for mobility. Based on the objective data described below, the patient presents with limited mobility assessment this date as pt is with orthostatic hypotension and symptomatic once standing at bedside. She begins to feel more faint as standing time increases. Pt unable to amb. Further due to above and HOB raised with pt educated on sitting EOB (with nursing) throughout the day and sitting upright in bed to assist with BP stabilization.   Will progress mobility once BP more stabilized and pt is asymptomatic. Continue to follow. Patient Vitals for the past 4 hrs:   Temp Pulse Resp BP SpO2                   08/30/22 1040 -- -- -- 94/64 --   08/30/22 1037 -- -- -- (!) 63/50 --   08/30/22 1029 -- -- -- (!) 88/60 --   08/30/22 1026 -- -- -- (!) 79/63 --   08/30/22 1024 -- 83 -- (!) 104/59 100 %     Current Level of Function Impacting Discharge (mobility/balance): bed mob. S ; transfers     Functional Outcome Measure: The patient scored Total: 70/100 on the Barthel Index outcome measure which is indicative of being minimally independent in basic self-care. Other factors to consider for discharge: pt has son that works during the day ; recent falls due to syncope     Patient will benefit from skilled therapy intervention to address the above noted impairments. PLAN :  Recommendations and Planned Interventions: bed mobility training, transfer training, gait training, therapeutic exercises, patient and family training/education, and therapeutic activities      Frequency/Duration: Patient will be followed by physical therapy:  4 times a week to address goals. Recommendation for discharge: (in order for the patient to meet his/her long term goals)  Physical therapy at least 2 days/week in the home AND ensure assist and/or supervision for safety with mobility    This discharge recommendation:  Has been made in collaboration with the attending provider and/or case management    IF patient discharges home will need the following DME: patient owns DME required for discharge         SUBJECTIVE:   Patient stated I fell twice recently.     OBJECTIVE DATA SUMMARY:   HISTORY:    Past Medical History:   Diagnosis Date    Aneurysm ([facility] [address]     Anxiety 01/28/2010    Brain aneurysm     2.5 mm    CAD (coronary artery disease)     carotid stenosis    Chronic kidney disease     CVA (cerebral vascular accident) ([facility] [address] 03/2021    right hemispheric    DM (diabetes mellitus) ([facility] [address] 01/28/2010    GERD (gastroesophageal reflux disease)     HTN (hypertension) 01/28/2010    Hyperlipidemia 01/28/2010    IBS (irritable bowel syndrome)     POTS (postural orthostatic tachycardia syndrome)     Psychiatric disorder     PUD (peptic ulcer disease)     Thromboembolus (HCC)      Past Surgical History:   Procedure Laterality Date    HX AMPUTATION TOE Right 2019    HX AMPUTATION TOE Left 06/2022    partial 2nd toe    HX COLONOSCOPY      IR CHOLECYSTOSTOMY PERCUTANEOUS  2016       Personal factors and/or comorbidities impacting plan of care:     Home Situation  Home Environment: Private residence  # Steps to Enter: 3  Rails to Enter: Yes  One/Two Story Residence: One story  Living Alone: No  Support Systems: Child(dion), Other Family Member(s)  Patient Expects to be Discharged to[de-identified] Home  Current DME Used/Available at Home: Maryfrances Lamprey, rolling, Cane, straight, Grab bars, Raised toilet seat, Shower chair  Tub or Shower Type: Tub/Shower combination    EXAMINATION/PRESENTATION/DECISION MAKING:   Critical Behavior:  Neurologic State: Alert  Orientation Level: Appropriate for age, Oriented X4  Cognition: Follows commands     Hearing:   Auditory  Auditory Impairment: None  Hearing Aids/Status: Does not own  Skin:  breakthrough drainage on bandage, dorsum L foot ; bypass incisional site bandaged  Edema: none  Range Of Motion:  AROM: Generally decreased, functional           PROM: Generally decreased, functional           Strength:    Strength: Generally decreased, functional                    Tone & Sensation:   Tone: Normal              Sensation: Impaired (neuropathy B feet)               Coordination:  Coordination: Within functional limits    Functional Mobility:  Bed Mobility:  Rolling: Modified independent  Supine to Sit: Supervision  Sit to Supine: Supervision  Scooting: Supervision  Transfers:  Sit to Stand: Stand-by assistance  Stand to Sit: Stand-by assistance                       Balance:   Sitting: Intact  Standing: Impaired  Standing - Static: Good  Standing - Dynamic : Fair  Ambulation/Gait Training:              Gait Description (WDL):  (deferred due to orthostatic)         Functional Measure:  Barthel Index:    Bathin  Bladder: 10  Bowels: 10  Groomin  Dressin  Feeding: 10  Mobility: 10  Stairs: 5  Toilet Use: 5  Transfer (Bed to Chair and Back): 10  Total: 70/100       The Barthel ADL Index: Guidelines  1. The index should be used as a record of what a patient does, not as a record of what a patient could do. 2. The main aim is to establish degree of independence from any help, physical or verbal, however minor and for whatever reason. 3. The need for supervision renders the patient not independent. 4. A patient's performance should be established using the best available evidence. Asking the patient, friends/relatives and nurses are the usual sources, but direct observation and common sense are also important. However direct testing is not needed. 5. Usually the patient's performance over the preceding 24-48 hours is important, but occasionally longer periods will be relevant. 6. Middle categories imply that the patient supplies over 50 per cent of the effort. 7. Use of aids to be independent is allowed. Score Interpretation (from [address] [address])    Independent   60-79 Minimally independent   40-59 Partially dependent   20-39 Very dependent   <20 Totally dependent     -Julee Bae., Barthel, D.W. (1965). Functional evaluation: the Barthel Index. [facility] ([address] (1997). The Barthel activities of daily living index: self-reporting versus actual performance in the old (> or = 75 years). Journal of [address] [place] [address] 45(7), 14 [facility], JKATT, Leidy Sanchez, Chava De La O. (1999). Measuring the change in disability after inpatient rehabilitation; comparison of the responsiveness of the Barthel Index and Functional [place] Measure.  Journal of Neurology, Neurosurgery, and Psychiatry, 664), 712-040. THADDEUS Flores, ROMAN Brown, & Mei Mendoza M.A. (2004) Assessment of post-stroke quality of life in cost-effectiveness studies: The usefulness of the Barthel Index and the EuroQoL-5D. Quality of Life Research, 15, 690-08        Physical Therapy Evaluation Charge Determination   History Examination Presentation Decision-Making   HIGH Complexity :3+ comorbidities / personal factors will impact the outcome/ POC  MEDIUM Complexity : 3 Standardized tests and measures addressing body structure, function, activity limitation and / or participation in recreation  MEDIUM Complexity : Evolving with changing characteristics  Other outcome measures Barthel  MEDIUM      Based on the above components, the patient evaluation is determined to be of the following complexity level: MEDIUM    Pain Rating:  No c/o pain, but c/o \"head feels like marshmallows\" with standing at side of bed    Activity Tolerance:   Fair and signs and symptoms of orthostatic hypotension    After treatment patient left in no apparent distress:   Call bell within reach, Side rails x 3, and HOB raised    COMMUNICATION/EDUCATION:   The patients plan of care was discussed with: Occupational therapist and Registered nurse. Fall prevention education was provided and the patient/caregiver indicated understanding., Patient/family have participated as able in goal setting and plan of care. , and Patient/family agree to work toward stated goals and plan of care.     Thank you for this referral.  Alexandre Bucio, PT, DPT   Time Calculation: 28 mins

## 2022-08-30 NOTE — ANESTHESIA POSTPROCEDURE EVALUATION
Procedure(s):  LEFT ANTERIOR TIBIAL TO DORSAL PEDAL BYPASS WITH VEIN. general    Anesthesia Post Evaluation        Patient location during evaluation: PACU  Note status: Adequate. Level of consciousness: responsive to verbal stimuli and sleepy but conscious  Pain management: satisfactory to patient  Airway patency: patent  Anesthetic complications: no  Cardiovascular status: acceptable  Respiratory status: acceptable  Hydration status: acceptable  Comments: +Post-Anesthesia Evaluation and Assessment    Patient: Alejandro Espino MRN: [id number]  SSN: [id number]   YOB: 1960  Age: 60 y.o. Sex: female      Cardiovascular Function/Vital Signs    BP (!) 121/59   Pulse 73   Temp 37.2 °C (98.9 °F)   Resp 22   Ht 5' 7\" (1.702 m)   Wt 89.2 kg (196 lb 10.4 oz)   SpO2 99%   BMI 30.80 kg/m²     Patient is status post Procedure(s):  LEFT ANTERIOR TIBIAL TO DORSAL PEDAL BYPASS WITH VEIN. Nausea/Vomiting: Controlled. Postoperative hydration reviewed and adequate. Pain:  Pain Scale 1: Numeric (0 - 10) (08/29/22 2045)  Pain Intensity 1: 0 (08/29/22 2045)   Managed. Neurological Status:   Neuro (WDL): Within Defined Limits (08/29/22 2020)   At baseline. Mental Status and Level of Consciousness: Arousable. Pulmonary Status:   O2 Device: Nasal cannula (08/29/22 2045)   Adequate oxygenation and airway patent. Complications related to anesthesia: None    Post-anesthesia assessment completed. No concerns. Signed By: Andrea Mistry MD    8/29/2022  Post anesthesia nausea and vomiting:  controlled      INITIAL Post-op Vital signs:   Vitals Value Taken Time   /59 08/29/22 2045   Temp 37.2 °C (98.9 °F) 08/29/22 2045   Pulse 74 08/29/22 2048   Resp 15 08/29/22 2048   SpO2 100 % 08/29/22 2048   Vitals shown include unvalidated device data.

## 2022-08-30 NOTE — PROGRESS NOTES
End of Shift Note    Bedside shift change report given to Yoryd Hager RN (oncoming nurse) by Tahmina Arreola RN (offgoing nurse). Report included the following information SBAR, Kardex, Procedure Summary, and Quality Measures    Shift worked:  [id number]     Shift summary and any significant changes:    Bladder scan reads 792 mL  Straight Cath performed; 820 mL  PRN Percocet for pain given x 3 during day shift. Concerns for physician to address:  Orthostatic hypotension       Activity:  Activity Level: Up with Assistance  Number times ambulated in hallways past shift: 0  Number of times OOB to chair past shift: 1    Cardiac:   Cardiac Monitoring: Yes      Cardiac Rhythm: Sinus Rhythm    Access:  Current line(s): PIV     Genitourinary:   Urinary status: due to void    Respiratory:   O2 Device: None (Room air)  Chronic home O2 use?: NO  Incentive spirometer at bedside: NO       GI:     Current diet:  ADULT DIET Regular; 4 carb choices (60 gm/meal)  Passing flatus: YES  Tolerating current diet: YES       Pain Management:   Patient states pain is manageable on current regimen: YES    Skin:  Torrey Score: 20  Interventions: increase time out of bed and PT/OT consult    Patient Safety:  Fall Score:  Total Score: 3  Interventions: gripper socks and pt to call before getting OOB  High Fall Risk: Yes    Length of Stay:  Expected LOS: 3d 2h  Actual LOS: [address], RN

## 2022-08-30 NOTE — PERIOP NOTES
Handoff Report from Operating Room to PACU    Report received from [address]  and [address] regarding Brendon Smith. Surgeon(s):  Margaux Armenta MD  And Procedure(s) (LRB):  LEFT ANTERIOR TIBIAL TO DORSAL PEDAL BYPASS WITH VEIN (Left)  confirmed   with allergies, drains, and dressings discussed. Anesthesia type, drugs, patient history, complications, estimated blood loss, vital signs, intake and output, and last pain medication and reversal medications were reviewed.

## 2022-08-30 NOTE — PROGRESS NOTES
Problem: Self Care Deficits Care Plan (Adult)  Goal: *Acute Goals and Plan of Care (Insert Text)  Description: FUNCTIONAL STATUS PRIOR TO ADMISSION: Patient was independent and active without use of DME. Patient with history of many falls, suspect 2/2 blood pressure. HOME SUPPORT: The patient lived with family but did not require assist.    Occupational Therapy Goals  Initiated 8/30/2022  1. Patient will perform standing grooming with modified independence within 7 day(s). 2.  Patient will perform lower body dressing with modified independence within 7 day(s). 3.  Patient will perform light household management task with supervision/set-up within 7 day(s). 4.  Patient will perform toilet transfers with modified independence within 7 day(s). 5.  Patient will perform all aspects of toileting with modified independence within 7 day(s). 6.  Patient will participate in upper extremity therapeutic exercise/activities with independence for 15 minutes within 7 day(s). 7.  Patient will utilize energy conservation and fall prevention techniques during functional activities with verbal cues within 7 day(s). Outcome: Progressing Towards Goal   OCCUPATIONAL THERAPY EVALUATION  Patient: Alejandro Espino (37 y.o. female)  Date: 8/30/2022  Primary Diagnosis: Critical lower limb ischemia (HCC) [I70.229]  Procedure(s) (LRB):  LEFT ANTERIOR TIBIAL TO DORSAL PEDAL BYPASS WITH VEIN (Left) 1 Day Post-Op   Precautions:   Fall, Contact WBAT    ASSESSMENT  Based on the objective data described below, the patient presents with impaired higher level balance, decreased activity tolerance, and orthostatic hypotension w/ symptoms s/p admission for critical lower limb ischemia with subsequent L anterior tibial to dorsal pedal bypass, POD 1. Patient is received resting in bed, agreeable to session and mobilizing with supervision to [address]. Patient dons socks in long-sitting, managing sock over L foot bandage.  Patient transitions to seated with minimal symptoms, improving with seated exercises. In standing, she is quickly symptomatic with orthostatic drop in BP noted. Patient is returned to supine, educated on increasing [facility] elevation to improve tolerance for upright positioning. Patient also receptive to education and discussion regarding home set-up, fall prevention, compensatory ADL strategies, and monitoring vitals to appropriately grade activity and return to roles/routines. Once medically stable, anticipate patient will be able to discharge home. Supine: /59  Sitting EOB: BP 79/63  Sitting EOB: BP 88/60  Standing: BP 63/50  Semi-fowlers: BP 94/64    Current Level of Function Impacting Discharge (ADLs/self-care): up to CGA    Functional Outcome Measure: The patient scored 65/100 on the Barthel Index. Other factors to consider for discharge: frequent falls; orthostatic hypotension     Patient will benefit from skilled therapy intervention to address the above noted impairments. PLAN :  Recommendations and Planned Interventions: self care training, functional mobility training, therapeutic exercise, balance training, therapeutic activities, endurance activities, patient education, and home safety training    Frequency/Duration: Patient will be followed by occupational therapy 4 times a week to address goals. Recommendation for discharge: (in order for the patient to meet his/her long term goals)  Occupational therapy at least 2 days/week in the home AND ensure assist and/or supervision for safety with IADLs and dynamic ADLs    This discharge recommendation:  Has not yet been discussed the attending provider and/or case management    IF patient discharges home will need the following DME:        SUBJECTIVE:   Patient stated My head feels like a marshmallow.     OBJECTIVE DATA SUMMARY:   HISTORY:   Past Medical History:   Diagnosis Date    Aneurysm ([address]     Anxiety 01/28/2010    Brain aneurysm     2.5 mm    CAD (coronary artery disease)     carotid stenosis    Chronic kidney disease     CVA (cerebral vascular accident) ([facility] [address] 03/2021    right hemispheric    DM (diabetes mellitus) ([facility] [address] 01/28/2010    GERD (gastroesophageal reflux disease)     HTN (hypertension) 01/28/2010    Hyperlipidemia 01/28/2010    IBS (irritable bowel syndrome)     POTS (postural orthostatic tachycardia syndrome)     Psychiatric disorder     PUD (peptic ulcer disease)     Thromboembolus ([facility] [address]      Past Surgical History:   Procedure Laterality Date    HX AMPUTATION TOE Right 2019    HX AMPUTATION TOE Left 06/2022    partial 2nd toe    HX COLONOSCOPY      IR CHOLECYSTOSTOMY PERCUTANEOUS  2016       Expanded or extensive additional review of patient history:     Home Situation  Home Environment: Private residence  # Steps to Enter: 3  Rails to Enter: Yes  One/Two Story Residence: One story  Living Alone: No  Support Systems: Child(dion), Other Family Member(s)  Patient Expects to be Discharged to[de-identified] Home  Current DME Used/Available at Home: [address], rolling, Cane, straight, Grab bars, Raised toilet seat, Shower chair  Tub or Shower Type: Tub/Shower combination    Hand dominance: Right    EXAMINATION OF PERFORMANCE DEFICITS:  Cognitive/Behavioral Status:  Neurologic State: Alert  Orientation Level: Appropriate for age;Oriented X4  Cognition: Follows commands  Perception: Appears intact  Perseveration: Perseverates during conversation  Safety/Judgement: Awareness of environment; Fall prevention;Home safety    Skin:     Edema:     Hearing: Auditory  Auditory Impairment: None  Hearing Aids/Status: Does not own    Vision/Perceptual:    Acuity: Within Defined Limits    Corrective Lenses: Reading glasses    Range of Motion:  AROM: Generally decreased, functional  PROM: Generally decreased, functional    Strength:  Strength: Generally decreased, functional    Coordination:  Coordination: Within functional limits  Fine Motor Skills-Upper: Left Intact; Right Intact Gross Motor Skills-Upper: Left Intact; Right Intact    Tone & Sensation:  Tone: Normal  Sensation: Impaired (neuropathy B feet)    Balance:  Sitting: Intact  Standing: Impaired  Standing - Static: Good  Standing - Dynamic : Fair    Functional Mobility and Transfers for ADLs:  Bed Mobility:  Rolling: Modified independent  Supine to Sit: Supervision  Sit to Supine: Supervision  Scooting: Supervision    Transfers:  Sit to Stand: Stand-by assistance  Stand to Sit: Stand-by assistance  Toilet Transfer : Contact guard assistance (infer to/from [facility] per BP)    ADL Assessment:  Feeding: Independent    Oral Facial Hygiene/Grooming: Setup;Contact guard assistance    Bathing: Contact guard assistance    Type of Bath: Chlorhexidine (CHG)    Upper Body Dressing: Independent    Lower Body Dressing: Contact guard assistance    Toileting: Contact guard assistance    ADL Intervention and task modifications:  Lower Body Dressing Assistance  Dressing Assistance: Contact guard assistance  Socks: Contact guard assistance  Position Performed: Long sitting on bed  Cues: Verbal cues provided;Visual cues provided    Cognitive Retraining  Safety/Judgement: Awareness of environment; Fall prevention;Home safety    Educated patient/caregiver on home set-up / safety and fall prevention.  The following are various strategies to be used as appropriate per home setting, patient's financial means, etc:  Have readily accessible lights or night lights between bed and bathroom (may consider glow-in-the dark / illuminated switches)   Store items within easy waist-height reach (ie dishes, clothing, etc)  Place non-slip mats inside of and outside of the tub/shower  Use removable bed rails or position body pillow at your side if having falls out of bed  Use a reacher or ask for help to  an item from the floor  Sit for lower body dressing and bathing tasks  Wear slipper-socks or secure shoe with rubber treads and enclosed heel in the home   Use bedside commode at night    Patient participating in education and discussion regarding home set-up, fall prevention, compensatory ADL strategies, and monitoring vitals to appropriately grade activity and return to roles/routines. Patient does own BP cuff as well as 2 BSC (one over toilet and one at bedside). Functional Measure:    Barthel Index:  Bathin  Bladder: 10  Bowels: 10  Groomin  Dressin  Feeding: 10  Mobility: 10  Stairs: 5  Toilet Use: 5  Transfer (Bed to Chair and Back): 10  Total: 70/100      The Barthel ADL Index: Guidelines  1. The index should be used as a record of what a patient does, not as a record of what a patient could do. 2. The main aim is to establish degree of independence from any help, physical or verbal, however minor and for whatever reason. 3. The need for supervision renders the patient not independent. 4. A patient's performance should be established using the best available evidence. Asking the patient, friends/relatives and nurses are the usual sources, but direct observation and common sense are also important. However direct testing is not needed. 5. Usually the patient's performance over the preceding 24-48 hours is important, but occasionally longer periods will be relevant. 6. Middle categories imply that the patient supplies over 50 per cent of the effort. 7. Use of aids to be independent is allowed. Score Interpretation (from [address] [address])    Independent   60-79 Minimally independent   40-59 Partially dependent   20-39 Very dependent   <20 Totally dependent     -Julee Bae., Barthel, D.W. (1965). Functional evaluation: the Barthel Index. [facility] ([address] (1997). The Barthel activities of daily living index: self-reporting versus actual performance in the old (> or = 75 years). Journal of [address] [place] [address] 45(7), [facility], J.JROBERTF, Nila Freitas., Rao Philip. (1999).  Measuring the change in disability after inpatient rehabilitation; comparison of the responsiveness of the Barthel Index and Functional Fisher Measure. Journal of Neurology, Neurosurgery, and Psychiatry, 66(4), 312-216. THADDEUS Dong, ROMAN Brown, & Steffanie Alfredo M.A. (2004) Assessment of post-stroke quality of life in cost-effectiveness studies: The usefulness of the Barthel Index and the EuroQoL-5D. Quality of Life Research, 15, 293-77         Occupational Therapy Evaluation Charge Determination   History Examination Decision-Making   LOW Complexity : Brief history review  LOW Complexity : 1-3 performance deficits relating to physical, cognitive , or psychosocial skils that result in activity limitations and / or participation restrictions  LOW Complexity : No comorbidities that affect functional and no verbal or physical assistance needed to complete eval tasks       Based on the above components, the patient evaluation is determined to be of the following complexity level: LOW   Pain Rating:  Mild reports. Activity Tolerance:   Fair and signs and symptoms of orthostatic hypotension    After treatment patient left in no apparent distress:    Supine in bed, Call bell within reach, Bed / chair alarm activated, Side rails x 3, and bed in modified chair position w/ VSS    COMMUNICATION/EDUCATION:   The patients plan of care was discussed with: Physical therapist and Registered nurse. Home safety education was provided and the patient/caregiver indicated understanding., Patient/family have participated as able in goal setting and plan of care. , and Patient/family agree to work toward stated goals and plan of care.     Thank you for this referral.  Orlando Aldana OT  Time Calculation: 32 mins

## 2022-08-30 NOTE — PROGRESS NOTES
Vascular Surgery Progress Note  Sofya Dykes ACNP-BC  8/30/2022       Subjective:     Ms. Moiz Abad has a pmhx significant for DM, HTN, HLD, ASHD, CHF, CKD, GERD, IBS, and anxiety/depression. She is a former smoker. She has a known 2.5 mm brain aneurysm. She has chronic dizziness attributed to POTS disease. She has a hx of a right hemispheric stroke in March of 2021. Her Plavix was discontinued for recurrent PUD per the  EMR. he is s/p a left partial second toe amputation in early June 2022. In early August she developed acute discoloration of multiple toes of the bilateral feet w/ open ulcerations. She is s/p left leg arteriogram 8/16 and right leg arteriogram 8/22. She is now admitted to the hospital s/p a left TONY => DP bypass with vein (8/29/22). Her postprocedure course is complicated by hypotension. After discussion with the patient she is a longstanding history of orthostatic hypotension and took midodrine for many years. She reports that it was stopped 1 year ago. Since her visit to the vascular lab last week she actually had syncopal episodes x2. She was admitted to [address] [place] [address] overnight. After review of the EMR her metoprolol has not been refilled recently and Norvasc was added to her regimen in July. This a.m. she remains on phenylephrine. She denies pain. Her feet are warm and well-perfused.     Nursing Data:     Patient Vitals for the past 24 hrs:   BP Temp Pulse Resp SpO2 Height Weight   08/30/22 0847 102/60 -- -- -- 99 % -- --   08/30/22 0720 119/67 97.7 °F (36.5 °C) 79 18 100 % -- --   08/30/22 0600 (!) 140/67 -- 77 15 100 % -- --   08/30/22 0500 (!) 104/57 -- 71 13 98 % -- --   08/30/22 0400 119/61 -- 74 15 99 % -- --   08/30/22 0350 -- -- 74 -- -- -- --   08/30/22 0300 128/71 98.5 °F (36.9 °C) 76 16 100 % -- --   08/30/22 0200 127/69 -- 76 11 100 % -- --   08/30/22 0100 134/70 -- 75 13 100 % -- --   08/30/22 0000 -- -- 74 -- -- -- --   08/29/22 2330 (!) 104/56 -- 72 18 100 % -- --   08/29/22 2317 (!) 89/54 -- 72 12 100 % -- --   08/29/22 2315 (!) 82/56 -- 72 13 100 % -- --   08/29/22 2230 125/64 -- 74 14 100 % -- --   08/29/22 2215 135/68 -- 74 13 100 % -- --   08/29/22 2200 (!) 116/58 98.7 °F (37.1 °C) 76 19 100 % -- --   08/29/22 2145 114/62 -- 73 18 99 % -- --   08/29/22 2130 137/60 -- 73 15 100 % -- --   08/29/22 2100 112/65 98.8 °F (37.1 °C) 72 18 100 % -- --   08/29/22 2045 (!) 121/59 98.9 °F (37.2 °C) 73 22 99 % -- --   08/29/22 2030 112/60 -- 74 16 100 % -- --   08/29/22 2026 (!) 120/56 98.5 °F (36.9 °C) 75 19 100 % -- --   08/29/22 2025 (!) 105/54 -- 74 15 100 % -- --   08/29/22 2020 (!) 102/51 -- 75 19 100 % -- --   08/29/22 2000 -- -- 73 -- -- -- --   08/29/22 1420 118/73 98.2 °F (36.8 °C) 69 17 99 % 5' 7\" (1.702 m) 89.2 kg (196 lb 10.4 oz)         Intake/Output Summary (Last 24 hours) at 8/30/2022 0951  Last data filed at 8/30/2022 0500  Gross per 24 hour   Intake 1470 ml   Output 960 ml   Net 510 ml       Exam:     Physical Exam  Constitutional:       Appearance: She is obese. Comments: Very pleasant   HENT:      Head: Normocephalic. Nose: Nose normal.      Mouth/Throat:      Mouth: Mucous membranes are moist.   Eyes:      Pupils: Pupils are equal, round, and reactive to light. Cardiovascular:      Rate and Rhythm: Normal rate and regular rhythm. Pulmonary:      Effort: Pulmonary effort is normal. No respiratory distress. Abdominal:      General: There is no distension. Palpations: Abdomen is soft. Musculoskeletal:         General: Normal range of motion. Cervical back: Normal range of motion. Skin:     General: Skin is warm. Comments: Distal surgical incision with moderate amount of dried drainage. Proximal dressing is dry and intact. Neurological:      General: No focal deficit present. Mental Status: She is alert. Mental status is at baseline. Lab Review:     .   Recent Results (from the past 24 hour(s))   URINALYSIS W/ REFLEX CULTURE    Collection Time: 08/29/22  1:27 PM    Specimen: Urine   Result Value Ref Range    Color YELLOW/STRAW      Appearance CLOUDY (A) CLEAR      Specific gravity 1.014      pH (UA) 5.5 5.0 - 8.0      Protein 30 (A) NEG mg/dL    Glucose Negative NEG mg/dL    Ketone Negative NEG mg/dL    Bilirubin Negative NEG      Blood SMALL (A) NEG      Urobilinogen 0.2 0.2 - 1.0 EU/dL    Nitrites Negative NEG      Leukocyte Esterase LARGE (A) NEG      WBC >100 (H) 0 - 4 /hpf    RBC 0-5 0 - 5 /hpf    Epithelial cells MANY (A) FEW /lpf    Bacteria Negative NEG /hpf    UA:UC IF INDICATED URINE CULTURE ORDERED (A) CNI     GLUCOSE, POC    Collection Time: 08/29/22  2:47 PM   Result Value Ref Range    Glucose (POC) 88 65 - 117 mg/dL    Performed by Milton Durham, POC    Collection Time: 08/29/22  8:28 PM   Result Value Ref Range    Glucose (POC) 163 (H) 65 - 117 mg/dL    Performed by Arjun Ferrell    CBC W/O DIFF    Collection Time: 08/30/22  2:16 AM   Result Value Ref Range    WBC 11.2 (H) 3.6 - 11.0 K/uL    RBC 3.40 (L) 3.80 - 5.20 M/uL    HGB 10.5 (L) 11.5 - 16.0 g/dL    HCT 33.6 (L) 35.0 - 47.0 %    MCV 98.8 80.0 - 99.0 FL    MCH 30.9 26.0 - 34.0 PG    MCHC 31.3 30.0 - 36.5 g/dL    RDW 16.9 (H) 11.5 - 14.5 %    PLATELET 038 (L) 174 - 400 K/uL    MPV 10.6 8.9 - 12.9 FL    NRBC 0.0 0  WBC    ABSOLUTE NRBC 0.00 0.00 - 5.89 K/uL   METABOLIC PANEL, BASIC    Collection Time: 08/30/22  2:16 AM   Result Value Ref Range    Sodium 137 136 - 145 mmol/L    Potassium 4.2 3.5 - 5.1 mmol/L    Chloride 108 97 - 108 mmol/L    CO2 21 21 - 32 mmol/L    Anion gap 8 5 - 15 mmol/L    Glucose 250 (H) 65 - 100 mg/dL    BUN 14 6 - 20 MG/DL    Creatinine 1.87 (H) 0.55 - 1.02 MG/DL    BUN/Creatinine ratio 7 (L) 12 - 20      GFR est AA 33 (L) >60 ml/min/1.73m2    GFR est non-AA 27 (L) >60 ml/min/1.73m2    Calcium 7.6 (L) 8.5 - 10.1 MG/DL          Assessment/Plan:     Left leg critical limb ischemia  -Status post left TONY => DP bypass w/ vein 8/29/22  Peripheral arterial disease  Right leg PAD with ulceration  -Status post right arteriogram 8/22/2022  -ASA, Xarelto, and statin   Normalized today. Encourage OOB with PT. WBAT. Encourage I-S. Continue current pain regimen. Xarelto at discharge. (Patient's insurance will not cover Eliquis)  Empiric antibxs    Postprocedural hypotension  History of hypertension   Orthostatic hypotension  POTS  Coronary artery disease  Hyperlipidemia   -ASA & statin   Initiate midodrine. Wean phenylephrine. Discontinue metoprolol. Hold Norvasc. Chronic hypomagnesemia   Add on level check     Diabetes mellitus w/ hyperglycemia   Add on HA1c.  SSS/POCT. Basal insulin. Outpatient follow up with PCP. Chronic renal disease stage IV  -near baseline of 2.0  Anemia of chronic disease/iron deficiency  -ferrous sulfate   -stable   Chronic thrombocytopenia   -unchanged     GERD  -Protonix   IBS    Anxiety and depression   -Seroqul, Cymbalta, & Buspar    VTE prophylaxis:  Xarelto     Disposition:   TBD after PT evaluation. Likely home in 1-2 days.

## 2022-08-30 NOTE — PROGRESS NOTES
Reason for Admission:  Critical Lower Limb Ischemia                     RUR Score:  12%                   Plan for utilizing home health:   Agreeable if recommended       PCP: First and Last name:  Winnie Garcia NP     Name of Practice:    Are you a current patient: Yes/No:    Approximate date of last visit: 1.5 months   Can you participate in a virtual visit with your PCP:                     Current Advanced Directive/Advance Care Plan: Full Code  CM confirmed with patient's son that she is a Full code    Healthcare Decision Maker:   Click here to complete [address] including selection of the Healthcare Decision Maker Relationship (ie \"Primary\")           Alexgabriela Luis, [phone]                  Transition of Care Plan:                    CM spoke with patient's son, [facility]. Patient lives at home with her son. There are 4 steps to enter the home. Patient has a shower chair and raised toilet seat. Patient is independent in care. [facility] and son is agreeable to recommendations if therapy is needed. Patient's family will be her transport. Patient uses the [facility] [facility] on [place], [place]. Current Dispo: Home/self.

## 2022-08-30 NOTE — BRIEF OP NOTE
Brief Postoperative Note    Patient: Arnaldo Vega  YOB: 1960  MRN: [id number]    Date of Procedure: 8/29/2022     Pre-Op Diagnosis: Peripheral Arterial Disease with left foot ulceration and osteomyelitis    Post-Op Diagnosis: Same as preoperative diagnosis. Procedure(s):  LEFT ANTERIOR TIBIAL TO DORSAL PEDAL BYPASS WITH VEIN    Surgeon(s):  Bianca Freeman MD    Surgical Assistant: Surg Asst-1: Jesus Wilkins    Anesthesia: General     Estimated Blood Loss (mL): 672     Complications: None    Specimens:   ID Type Source Tests Collected by Time Destination   1 : villegas urine Urine Villegas Specimen URINE CULTURE, ROUTINE Bianca Freeman MD 8/29/2022 1542 Microbiology        Implants: * No implants in log *    Drains: * No LDAs found *    Findings: Left distal AT to DP bypass w/ RSVG. Thrombus in DP. Good flow on completion.     Electronically Signed by Jaylin Nicole MD on 8/29/2022 at 8:19 PM

## 2022-08-30 NOTE — PERIOP NOTES
TRANSFER - OUT REPORT:    Verbal report given to 911 Natalie Chaudhari RN (name) on Arnaldo Vega  being transferred to 2243(unit) for routine post - op       Report consisted of patients Situation, Background, Assessment and   Recommendations(SBAR). Information from the following report(s) SBAR, Kardex, OR Summary, Intake/Output, MAR, Recent Results, and Cardiac Rhythm NSR  was reviewed with the receiving nurse. Opportunity for questions and clarification was provided.       Patient transported with:   Monitor  O2 @ 2 liters  Registered Nurse

## 2022-08-30 NOTE — PROGRESS NOTES
Problem: Risk for Spread of Infection  Goal: Prevent transmission of infectious organism to others  Description: Prevent the transmission of infectious organisms to other patients, staff members, and visitors. Outcome: Progressing Towards Goal     Problem: Patient Education:  Go to Education Activity  Goal: Patient/Family Education  Outcome: Progressing Towards Goal     Problem: Falls - Risk of  Goal: *Absence of Falls  Description: Document Mau Ivey Fall Risk and appropriate interventions in the flowsheet.   Outcome: Progressing Towards Goal  Note: Fall Risk Interventions:  Mobility Interventions: Assess mobility with egress test              Elimination Interventions: Bed/chair exit alarm    History of Falls Interventions: Bed/chair exit alarm         Problem: Patient Education: Go to Patient Education Activity  Goal: Patient/Family Education  Outcome: Progressing Towards Goal

## 2022-08-31 LAB
ANION GAP SERPL CALC-SCNC: 4 MMOL/L (ref 5–15)
BACTERIA SPEC CULT: ABNORMAL
BACTERIA SPEC CULT: ABNORMAL
BUN SERPL-MCNC: 18 MG/DL (ref 6–20)
BUN/CREAT SERPL: 9 (ref 12–20)
CALCIUM SERPL-MCNC: 7.9 MG/DL (ref 8.5–10.1)
CC UR VC: ABNORMAL
CHLORIDE SERPL-SCNC: 108 MMOL/L (ref 97–108)
CO2 SERPL-SCNC: 26 MMOL/L (ref 21–32)
CREAT SERPL-MCNC: 1.97 MG/DL (ref 0.55–1.02)
ERYTHROCYTE [DISTWIDTH] IN BLOOD BY AUTOMATED COUNT: 17.4 % (ref 11.5–14.5)
GLUCOSE BLD STRIP.AUTO-MCNC: 132 MG/DL (ref 65–117)
GLUCOSE BLD STRIP.AUTO-MCNC: 142 MG/DL (ref 65–117)
GLUCOSE BLD STRIP.AUTO-MCNC: 143 MG/DL (ref 65–117)
GLUCOSE BLD STRIP.AUTO-MCNC: 168 MG/DL (ref 65–117)
GLUCOSE SERPL-MCNC: 175 MG/DL (ref 65–100)
HCT VFR BLD AUTO: 31.6 % (ref 35–47)
HGB BLD-MCNC: 9.7 G/DL (ref 11.5–16)
MCH RBC QN AUTO: 30.8 PG (ref 26–34)
MCHC RBC AUTO-ENTMCNC: 30.7 G/DL (ref 30–36.5)
MCV RBC AUTO: 100.3 FL (ref 80–99)
NRBC # BLD: 0 K/UL (ref 0–0.01)
NRBC BLD-RTO: 0 PER 100 WBC
PLATELET # BLD AUTO: 159 K/UL (ref 150–400)
PMV BLD AUTO: 10.1 FL (ref 8.9–12.9)
POTASSIUM SERPL-SCNC: 4.8 MMOL/L (ref 3.5–5.1)
RBC # BLD AUTO: 3.15 M/UL (ref 3.8–5.2)
SERVICE CMNT-IMP: ABNORMAL
SODIUM SERPL-SCNC: 138 MMOL/L (ref 136–145)
WBC # BLD AUTO: 11.5 K/UL (ref 3.6–11)

## 2022-08-31 PROCEDURE — 74011636637 HC RX REV CODE- 636/637: Performed by: NURSE PRACTITIONER

## 2022-08-31 PROCEDURE — 74011250636 HC RX REV CODE- 250/636: Performed by: SURGERY

## 2022-08-31 PROCEDURE — 80048 BASIC METABOLIC PNL TOTAL CA: CPT

## 2022-08-31 PROCEDURE — 74011250636 HC RX REV CODE- 250/636: Performed by: NURSE PRACTITIONER

## 2022-08-31 PROCEDURE — 85027 COMPLETE CBC AUTOMATED: CPT

## 2022-08-31 PROCEDURE — 97535 SELF CARE MNGMENT TRAINING: CPT | Performed by: OCCUPATIONAL THERAPIST

## 2022-08-31 PROCEDURE — 82962 GLUCOSE BLOOD TEST: CPT

## 2022-08-31 PROCEDURE — 65270000046 HC RM TELEMETRY

## 2022-08-31 PROCEDURE — 74011250637 HC RX REV CODE- 250/637: Performed by: SURGERY

## 2022-08-31 PROCEDURE — 97530 THERAPEUTIC ACTIVITIES: CPT | Performed by: OCCUPATIONAL THERAPIST

## 2022-08-31 PROCEDURE — 51798 US URINE CAPACITY MEASURE: CPT

## 2022-08-31 PROCEDURE — 74011000258 HC RX REV CODE- 258: Performed by: SURGERY

## 2022-08-31 PROCEDURE — 36415 COLL VENOUS BLD VENIPUNCTURE: CPT

## 2022-08-31 PROCEDURE — 74011250637 HC RX REV CODE- 250/637: Performed by: NURSE PRACTITIONER

## 2022-08-31 RX ORDER — LANOLIN ALCOHOL/MO/W.PET/CERES
6 CREAM (GRAM) TOPICAL
Status: DISCONTINUED | OUTPATIENT
Start: 2022-08-31 | End: 2022-09-01 | Stop reason: HOSPADM

## 2022-08-31 RX ORDER — LANOLIN ALCOHOL/MO/W.PET/CERES
6 CREAM (GRAM) TOPICAL
Status: COMPLETED | OUTPATIENT
Start: 2022-08-31 | End: 2022-08-31

## 2022-08-31 RX ADMIN — OXYCODONE AND ACETAMINOPHEN 2 TABLET: 5; 325 TABLET ORAL at 04:51

## 2022-08-31 RX ADMIN — BUSPIRONE HYDROCHLORIDE 10 MG: 5 TABLET ORAL at 17:50

## 2022-08-31 RX ADMIN — ROSUVASTATIN CALCIUM 40 MG: 40 TABLET, FILM COATED ORAL at 22:03

## 2022-08-31 RX ADMIN — MIDODRINE HYDROCHLORIDE 5 MG: 5 TABLET ORAL at 12:46

## 2022-08-31 RX ADMIN — OXYCODONE AND ACETAMINOPHEN 2 TABLET: 5; 325 TABLET ORAL at 00:19

## 2022-08-31 RX ADMIN — PANTOPRAZOLE SODIUM 40 MG: 40 TABLET, DELAYED RELEASE ORAL at 08:49

## 2022-08-31 RX ADMIN — OXYCODONE AND ACETAMINOPHEN 1 TABLET: 5; 325 TABLET ORAL at 18:40

## 2022-08-31 RX ADMIN — OXYCODONE AND ACETAMINOPHEN 1 TABLET: 5; 325 TABLET ORAL at 12:46

## 2022-08-31 RX ADMIN — DULOXETINE HYDROCHLORIDE 30 MG: 30 CAPSULE, DELAYED RELEASE ORAL at 17:50

## 2022-08-31 RX ADMIN — FERROUS SULFATE TAB 325 MG (65 MG ELEMENTAL FE) 325 MG: 325 (65 FE) TAB at 17:50

## 2022-08-31 RX ADMIN — INSULIN GLARGINE 6 UNITS: 100 INJECTION, SOLUTION SUBCUTANEOUS at 21:53

## 2022-08-31 RX ADMIN — Medication 400 MG: at 08:48

## 2022-08-31 RX ADMIN — DULOXETINE HYDROCHLORIDE 30 MG: 30 CAPSULE, DELAYED RELEASE ORAL at 08:49

## 2022-08-31 RX ADMIN — Medication 2 UNITS: at 17:49

## 2022-08-31 RX ADMIN — PHENYLEPHRINE HYDROCHLORIDE 15 MCG/MIN: 10 INJECTION INTRAVENOUS at 00:19

## 2022-08-31 RX ADMIN — QUETIAPINE FUMARATE 50 MG: 25 TABLET ORAL at 08:48

## 2022-08-31 RX ADMIN — MIDODRINE HYDROCHLORIDE 5 MG: 5 TABLET ORAL at 08:48

## 2022-08-31 RX ADMIN — MEROPENEM 1 G: 1 INJECTION, POWDER, FOR SOLUTION INTRAVENOUS at 18:01

## 2022-08-31 RX ADMIN — MEROPENEM 1 G: 1 INJECTION, POWDER, FOR SOLUTION INTRAVENOUS at 04:24

## 2022-08-31 RX ADMIN — QUETIAPINE FUMARATE 200 MG: 100 TABLET ORAL at 21:52

## 2022-08-31 RX ADMIN — MIDODRINE HYDROCHLORIDE 5 MG: 5 TABLET ORAL at 17:50

## 2022-08-31 RX ADMIN — FERROUS SULFATE TAB 325 MG (65 MG ELEMENTAL FE) 325 MG: 325 (65 FE) TAB at 08:48

## 2022-08-31 RX ADMIN — PANTOPRAZOLE SODIUM 40 MG: 40 TABLET, DELAYED RELEASE ORAL at 17:50

## 2022-08-31 RX ADMIN — MELATONIN 6 MG: at 21:53

## 2022-08-31 RX ADMIN — BUSPIRONE HYDROCHLORIDE 10 MG: 5 TABLET ORAL at 08:48

## 2022-08-31 RX ADMIN — MELATONIN 6 MG: at 01:37

## 2022-08-31 RX ADMIN — ASPIRIN 81 MG: 81 TABLET, COATED ORAL at 08:48

## 2022-08-31 RX ADMIN — Medication 2 UNITS: at 08:47

## 2022-08-31 RX ADMIN — SODIUM CHLORIDE 25 ML/HR: 9 INJECTION, SOLUTION INTRAVENOUS at 00:35

## 2022-08-31 NOTE — PROGRESS NOTES
1900: Bedside shift change report given to 2001 [facility] and Denia Gonzales RN (oncoming nurse) by Brennon Millan (offgoing nurse). Report included the following information SBAR, Kardex, Intake/Output, MAR, Recent Results, and Cardiac Rhythm NSR . Denia Gonzales RN will be assessing and charting on this patient. I will review assessments and charting. 1953: Bhaskar titrated down to 15 mcg/min    2123: pt had episode of orthostatic hypotension when ambulating to the [facility]. Pt's pressure was 73/53 when sitting on the [facility]. Pt reports no symptoms. Pt assisted back to bed. Pressure recheck was 126/67.     2304: Bhaskar titrated to 20 mcg/min    0019: Bhaskar titrated to 15 mcg/min    0115: Post void bladder scan showed 900+ ml. Pt straight cathed and 900ml urine out. 5627: Bhaskar titrated to 10 mcg/min    0336: Bhaskar stopped. I have reviewed and agree with Denia Gonzales RN's assessments and charting.     0700: End of Shift Note    Bedside shift change report given to 31 [facility] (oncoming nurse) by Kassidy Montelongo RN (offgoing nurse). Report included the following information SBAR, Kardex, Intake/Output, MAR, Recent Results, and Cardiac Rhythm NSR    Shift worked:  [id number]     Shift summary and any significant changes:     PRN percocet given 3x. Bhaskar gtt on hold     Concerns for physician to address:  BP stable when in bed, still dropping to 57'E systolic when sitting/standing. Pt straight cathed 1x, (2nd since villegas removed)     [facility]          Activity:  Activity Level:  Up with Assistance  Number times ambulated in hallways past shift: 0  Number of times OOB to chair past shift: 1    Cardiac:   Cardiac Monitoring: Yes      Cardiac Rhythm: Sinus Rhythm    Access:  Current line(s): PIV     Genitourinary:   Urinary status: straight cath    Respiratory:   O2 Device: None (Room air)  Chronic home O2 use?: NO  Incentive spirometer at bedside: YES       GI:     Current diet:  ADULT DIET Regular; 4 carb choices (60 gm/meal)  Passing flatus: YES  Tolerating current diet: YES       Pain Management:   Patient states pain is manageable on current regimen: YES    Skin:  Torrey Score: 20  Interventions: increase time out of bed    Patient Safety:  Fall Score:  Total Score: 4  Interventions: bed/chair alarm, assistive device (walker, cane, etc), gripper socks, and pt to call before getting OOB  High Fall Risk: Yes    Length of Stay:  Expected LOS: 3d 2h  Actual LOS: 2      Zuri Tirado RN

## 2022-08-31 NOTE — PROGRESS NOTES
Problem: Risk for Spread of Infection  Goal: Prevent transmission of infectious organism to others  Description: Prevent the transmission of infectious organisms to other patients, staff members, and visitors. Outcome: Progressing Towards Goal     Problem: Patient Education:  Go to Education Activity  Goal: Patient/Family Education  Outcome: Progressing Towards Goal     Problem: Falls - Risk of  Goal: *Absence of Falls  Description: Document Pam Greer Fall Risk and appropriate interventions in the flowsheet.   Outcome: Progressing Towards Goal  Note: Fall Risk Interventions:  Mobility Interventions: Bed/chair exit alarm, Communicate number of staff needed for ambulation/transfer, Patient to call before getting OOB, PT Consult for mobility concerns, PT Consult for assist device competence, Strengthening exercises (ROM-active/passive)         Medication Interventions: Bed/chair exit alarm, Patient to call before getting OOB, Teach patient to arise slowly    Elimination Interventions: Call light in reach, Patient to call for help with toileting needs, Toileting schedule/hourly rounds, Bed/chair exit alarm    History of Falls Interventions: Bed/chair exit alarm, Door open when patient unattended, Investigate reason for fall         Problem: Patient Education: Go to Patient Education Activity  Goal: Patient/Family Education  Outcome: Progressing Towards Goal     Problem: Patient Education: Go to Patient Education Activity  Goal: Patient/Family Education  Outcome: Progressing Towards Goal     Problem: Patient Education: Go to Patient Education Activity  Goal: Patient/Family Education  Outcome: Progressing Towards Goal

## 2022-08-31 NOTE — PROGRESS NOTES
5904 Bedside and Verbal shift change report given to Felix Peace, RN and Herb Cockayne, RN (oncoming nurse) by Cali Anderson RN (offgoing nurse). Report included the following information SBAR, Kardex, ED Summary, Intake/Output, MAR, Recent Results, and Cardiac Rhythm NSR . End of Shift Note    Bedside shift change report given to Cali Anderson RN and Marce Sandy RN (oncoming nurse) by Vishal Diamond RN and Herb Cockayne, RN (offgoing nurse). Report included the following information SBAR, Kardex, ED Summary, Procedure Summary, Intake/Output, MAR, Recent Results, and Cardiac Rhythm NSR. Shift worked:  7am to [employer] summary and any significant changes:     Bladder scanned patient, showed 768mL in bladder. Bladder scanned again before straight cath, resulted 982mL. 600mL of urine off after straight cath. Messaged on-call vascular specialist MD- verbal ordered received to reinsert patient's villegas d/t urinary retention. Villegas placed at 1817. Concerns for physician to address:  Discharge plans, orthostatic hypotension, and urinary retention. Zone phone for oncoming shift:            Activity:  Activity Level:  Up with Assistance  Number times ambulated in hallways past shift: 0  Number of times OOB to chair past shift: 0    Cardiac:   Cardiac Monitoring: Yes      Cardiac Rhythm: Sinus Rhythm    Access:  Current line(s): PIV and midline     Genitourinary:   Urinary status: villegas    Respiratory:   O2 Device: None (Room air)  Chronic home O2 use?: NO  Incentive spirometer at bedside: YES       GI:  Last Bowel Movement Date: 08/27/22  Current diet:  ADULT DIET Regular; 4 carb choices (60 gm/meal)  Passing flatus: YES  Tolerating current diet: YES       Pain Management:   Patient states pain is manageable on current regimen: YES    Skin:  Torrey Score: 19  Interventions: turn team, speciality bed, float heels, increase time out of bed, foam dressing, PT/OT consult, limit briefs, internal/external urinary devices, and nutritional support Patient Safety:  Fall Score:  Total Score: 4  Interventions: bed/chair alarm, assistive device (walker, cane, etc), gripper socks, pt to call before getting OOB, and stay with me (per policy)  High Fall Risk: Yes    Length of Stay:  Expected LOS: 3d 2h  Actual LOS: 2      Vishal Diamond RN

## 2022-08-31 NOTE — PROGRESS NOTES
1324 Critical lab result received - urine culture was positive for ESBL and Ryland Fleischer, MD notified.     3100 [facility] MD Bj paged about patient's urine retention and orthostatic hypotension     1352 Received verbal orders from on call vascular specialist MD to reinsert patient's villegas d/t urine retention

## 2022-08-31 NOTE — PROGRESS NOTES
Problem: Self Care Deficits Care Plan (Adult)  Goal: *Acute Goals and Plan of Care (Insert Text)  Description: FUNCTIONAL STATUS PRIOR TO ADMISSION: Patient was independent and active without use of DME. Patient with history of many falls, suspect 2/2 blood pressure. HOME SUPPORT: The patient lived with family but did not require assist.    Occupational Therapy Goals  Initiated 8/30/2022  1. Patient will perform standing grooming with modified independence within 7 day(s). 2.  Patient will perform lower body dressing with modified independence within 7 day(s). 3.  Patient will perform light household management task with supervision/set-up within 7 day(s). 4.  Patient will perform toilet transfers with modified independence within 7 day(s). 5.  Patient will perform all aspects of toileting with modified independence within 7 day(s). 6.  Patient will participate in upper extremity therapeutic exercise/activities with independence for 15 minutes within 7 day(s). 7.  Patient will utilize energy conservation and fall prevention techniques during functional activities with verbal cues within 7 day(s). Outcome: Progressing Towards Goal  OCCUPATIONAL THERAPY TREATMENT  Patient: Manjula Rodriguez (1 y.o. female)  Date: 8/31/2022  Diagnosis: Critical lower limb ischemia ([facility] [address] [I70.229] <principal problem not specified>  Procedure(s) (LRB):  LEFT ANTERIOR TIBIAL TO DORSAL PEDAL BYPASS WITH VEIN (Left) 2 Days Post-Op  Precautions: Fall, Contact, WBAT  Chart, occupational therapy assessment, plan of care, and goals were reviewed. ASSESSMENT  Patient presented supine in bed agreeable to participate in OT treatment. She is eager to regain her functional independence, but remains limited by Orthostatic hypotension (asymptomatic), GW, decreased activity tolerance and decreased standing balance.  Overall she was independent to mod I for bed mobility, SBA sit to stand, supervision for seated grooming and she was supervision to SBA for LB dressing. Her BP dropped as low as 64/44 during this session, and only briefly recovered in response to isometric core exercises. At this time she continues to benefit from acute OT and will likely need HHOT, PT and assistance/supervision for ADLS and functional mobility PRN after discharge. PLAN :  Patient continues to benefit from skilled intervention to address the above impairments. Continue treatment per established plan of care. to address goals. Recommendation for discharge: (in order for the patient to meet his/her long term goals)  Occupational therapy at least 2 days/week in the home AND ensure assist and/or supervision for safety with ADLs and functional mobility       Equipment recommendations for successful discharge (if) home:TBD pending progress. OBJECTIVE DATA SUMMARY:   Cognitive/Behavioral Status:  Neurologic State: Alert  Orientation Level: Oriented X4  Cognition: Appropriate decision making; Follows commands        Safety/Judgement: Awareness of environment; Insight into deficits    Functional Mobility and Transfers for ADLs:  Bed Mobility:  Rolling: Modified independent  Supine to Sit: Modified independent  Sit to Supine: Modified independent  Scooting: Independent    Transfers:  Sit to Stand: Stand-by assistance          Balance:  Sitting: Intact  Standing: Impaired  Standing - Static: Good  Standing - Dynamic : Good    ADL Intervention:    Grooming  Position Performed: Seated edge of bed  Washing Face: Supervision;Set-up  Washing Hands: Supervision;Set-up    Lower Body Dressing Assistance  Underpants: Stand-by assistance  Socks: Supervision;Set-up  Leg Crossed Method Used: Yes  Position Performed: Seated edge of bed;Standing    Cognitive Retraining  Safety/Judgement: Awareness of environment; Insight into deficits        Pain:  No complaint of pain    Activity Tolerance:   Fair, but limited by orthostatic hypotension  BP 89/62 supine, 64/44 after seated activity, 89/63 s/p seated isometric core exercises, 64/47 seated after standing to pull up underpants. Patient left supine with a BP of 90/73. Nursing notified.      After treatment patient left in no apparent distress:   Supine in bed, Call bell within reach, and Bed / chair alarm activated    COMMUNICATION/COLLABORATION:   The patients plan of care was discussed with: Registered Nurse    Melissa Loo, OTR/L  Time Calculation: 29 mins

## 2022-08-31 NOTE — PROGRESS NOTES
Transition of Care Plan:    RUR:  12% low risk for readmission  Disposition:  Home with HH ([facility] [facility] accepted)  Follow up appointments: PCP/Specialists  DME needed: Pt owns needed DME for discharge  Transportation at Discharge:  Son to transport  Keys or means to access home:    Son has access    IM Medicare Letter: Commercial insurance  Caregiver Contact:  Son, Ke Fuentes  Discharge Caregiver contacted prior to discharge? Family will be contacted prior to discharge  Care Conference needed?:  n/a    CM has started working on discharge plan. PT/OT georgina recommending [facility] services. CM spoke with patient and she is agreeable to [facility], reports that she has used both [facility] and [facility] in the past.  Referral sent to [facility] and they have accepted. Pt currently has all DME needed to discharge home. CM will continue to follow and assist with any additional discharge needs. Care Management Interventions  PCP Verified by CM: Yes (1.5 months ago)  Mode of Transport at Discharge: Other (see comment) (Family will transport)  Transition of Care Consult (CM Consult): Discharge Planning  Support Systems: Child(dion), Other Family Member(s)  Confirm Follow Up Transport: Family  Discharge Location  Patient Expects to be Discharged to[de-identified] Home with home health      Maliha Whiteside.  Alisson Medrano, [address] [place] - [address]  [employer] ACP Facilitator  Zone Phone: [phone]

## 2022-08-31 NOTE — PROGRESS NOTES
Vascular Surgery Progress Note  Marj Expose ACNP-BC  8/31/2022       Subjective:     Ms. Shannan Smith has a pmhx significant for DM, HTN, HLD, ASHD, CHF, CKD, GERD, IBS, and anxiety/depression. She is a former smoker. She has a known 2.5 mm brain aneurysm. She has chronic dizziness attributed to POTS disease. She has a hx of a right hemispheric stroke in March of 2021. Her Plavix was discontinued for recurrent PUD per the  EMR. he is s/p a left partial second toe amputation in early June 2022. In early August she developed acute discoloration of multiple toes of the bilateral feet w/ open ulcerations. She is s/p left leg arteriogram 8/16 and right leg arteriogram 8/22. She is now admitted to the hospital s/p a left TONY => DP bypass with vein (8/29/22). Her postprocedure course is complicated by hypotension. After discussion with the patient she is a longstanding history of orthostatic hypotension and took midodrine for many years. She reports that it was stopped 1 year ago. Since her visit to the vascular lab last week she actually had syncopal episodes x2. She was admitted to [address] [place] [address] overnight. This am she has been weaned off vasopressor support. Her BP is labile and soft at times with midodrine. She failed to void following catheter removal.  Her pre-procedure urine cx is significant for ESBL producing gram neg rods. Meropenem has been initiated. Her feet remain warm and well perfused.       Nursing Data:     Patient Vitals for the past 24 hrs:   BP Temp Pulse Resp SpO2   08/31/22 1246 129/65 -- 90 -- --   08/31/22 1146 138/74 97.6 °F (36.4 °C) 82 17 94 %   08/31/22 0900 (!) 104/59 97.7 °F (36.5 °C) 86 14 100 %   08/31/22 0847 114/64 -- 88 -- --   08/31/22 0745 129/75 98.2 °F (36.8 °C) 88 14 93 %   08/31/22 0435 (!) 121/98 -- 80 14 --   08/31/22 0336 107/65 -- -- -- --   08/31/22 0300 105/65 98.1 °F (36.7 °C) 75 10 92 %   08/31/22 0228 109/72 -- -- -- --   08/30/22 2315 110/63 97.7 °F (36.5 °C) 77 13 92 %   08/30/22 2304 (!) 86/56 -- 76 -- --   08/30/22 2230 (!) 91/54 -- 79 13 91 %   08/30/22 2215 (!) 101/57 -- 81 14 90 %   08/30/22 2200 116/61 -- 84 16 91 %   08/30/22 2145 123/64 -- 85 13 95 %   08/30/22 2130 126/67 -- 97 26 --   08/30/22 2123 (!) 73/53 -- 99 17 --   08/30/22 2119 (!) 78/51 -- 96 10 --   08/30/22 1953 113/68 97.8 °F (36.6 °C) 85 19 96 %   08/30/22 1709 (!) 117/57 -- 87 14 94 %           Intake/Output Summary (Last 24 hours) at 8/31/2022 1514  Last data filed at 8/31/2022 1119  Gross per 24 hour   Intake 1788.6 ml   Output 2320 ml   Net -531.4 ml         Exam:     Physical Exam  Constitutional:       Appearance: She is obese. Comments: Very pleasant   HENT:      Head: Normocephalic. Nose: Nose normal.      Mouth/Throat:      Mouth: Mucous membranes are moist.   Eyes:      Pupils: Pupils are equal, round, and reactive to light. Cardiovascular:      Rate and Rhythm: Normal rate and regular rhythm. Pulmonary:      Effort: Pulmonary effort is normal. No respiratory distress. Abdominal:      General: There is no distension. Palpations: Abdomen is soft. Musculoskeletal:         General: Normal range of motion. Cervical back: Normal range of motion. Skin:     General: Skin is warm. Comments: Distal surgical incision with moderate amount of dried drainage. Proximal dressing is dry and intact. Neurological:      General: No focal deficit present. Mental Status: She is alert. Mental status is at baseline. Lab Review:     .   Recent Results (from the past 24 hour(s))   GLUCOSE, POC    Collection Time: 08/30/22  5:21 PM   Result Value Ref Range    Glucose (POC) 164 (H) 65 - 117 mg/dL    Performed by Irena Francois RN    GLUCOSE, POC    Collection Time: 08/30/22  9:15 PM   Result Value Ref Range    Glucose (POC) 321 (H) 65 - 117 mg/dL    Performed by [address] [place] [address], POC    Collection Time: 08/30/22  9:24 PM   Result Value Ref Range Glucose (POC) 163 (H) 65 - 117 mg/dL    Performed by Shannon Fairbanks RN    CBC W/O DIFF    Collection Time: 08/31/22 12:41 AM   Result Value Ref Range    WBC 11.5 (H) 3.6 - 11.0 K/uL    RBC 3.15 (L) 3.80 - 5.20 M/uL    HGB 9.7 (L) 11.5 - 16.0 g/dL    HCT 31.6 (L) 35.0 - 47.0 %    .3 (H) 80.0 - 99.0 FL    MCH 30.8 26.0 - 34.0 PG    MCHC 30.7 30.0 - 36.5 g/dL    RDW 17.4 (H) 11.5 - 14.5 %    PLATELET 473 090 - 280 K/uL    MPV 10.1 8.9 - 12.9 FL    NRBC 0.0 0  WBC    ABSOLUTE NRBC 0.00 0.00 - 7.04 K/uL   METABOLIC PANEL, BASIC    Collection Time: 08/31/22 12:41 AM   Result Value Ref Range    Sodium 138 136 - 145 mmol/L    Potassium 4.8 3.5 - 5.1 mmol/L    Chloride 108 97 - 108 mmol/L    CO2 26 21 - 32 mmol/L    Anion gap 4 (L) 5 - 15 mmol/L    Glucose 175 (H) 65 - 100 mg/dL    BUN 18 6 - 20 MG/DL    Creatinine 1.97 (H) 0.55 - 1.02 MG/DL    BUN/Creatinine ratio 9 (L) 12 - 20      GFR est AA 31 (L) >60 ml/min/1.73m2    GFR est non-AA 26 (L) >60 ml/min/1.73m2    Calcium 7.9 (L) 8.5 - 10.1 MG/DL   GLUCOSE, POC    Collection Time: 08/31/22  7:40 AM   Result Value Ref Range    Glucose (POC) 142 (H) 65 - 117 mg/dL    Performed by Angela Goldsmith PCT    GLUCOSE, POC    Collection Time: 08/31/22 11:55 AM   Result Value Ref Range    Glucose (POC) 132 (H) 65 - 117 mg/dL    Performed by Angela Goldsmith PCT           Assessment/Plan:     Left leg critical limb ischemia  -Status post left TONY => DP bypass w/ vein 8/29/22  Peripheral arterial disease  Right leg PAD with ulceration  -Status post right arteriogram 8/22/2022  -ASA, Xarelto, and statin   Continue to encourage OOB with PT. WBAT. Encourage I-S. Continue current pain regimen. Xarelto at discharge.   (Patient's insurance will not cover Eliquis)    UTI  -ESBL producing gram negative rods  Meropenem   Urinary retention  Continue to straight cath q6-8hours    Postprocedural hypotension  -resolved wean of vasopressor support  Hypertensive disorder  -BP is labile and soft at times on midodrine   -patient reports that she was taking metoprolol and was not taking Norvasc prior to presenting   Continue to hold antihypertensives  Orthostatic hypotension  POTS  Coronary artery disease  Hyperlipidemia   -ASA & statin   Outpatient follow up with cardiology     Chronic hypomagnesemia   -therapeutic with supplementation     Diabetes mellitus w/ hyperglycemia   -HA1c 6.4  Improved on basal insulin  SSS/POCT. Outpatient follow up with PCP. Chronic renal disease stage IV  -at baseline of 2.0  Anemia of chronic disease/iron deficiency  -ferrous sulfate   -relatively stable   Chronic thrombocytopenia   -currently w/in nl limits     GERD  -Protonix   IBS    Anxiety and depression   -Seroqul, Cymbalta, & Buspar    VTE prophylaxis:  Xarelto-currently held for oozing from foot wound. SCDs contraindicated    Disposition:   Home with HH. Likely home in the am.  Consult case management.

## 2022-08-31 NOTE — PROGRESS NOTES
1900: Bedside shift change report given to 2001 [facility] and Coty Dc RN (oncoming nurse) by 31 Jayson Almeida and Jaleesa Baumann RN (offgoing nurse). Report included the following information SBAR, Kardex, Intake/Output, MAR, Recent Results, and Cardiac Rhythm NSR .       2153: PRN melatonin given    0144: PRN percocet given    0700: End of Shift Note    Bedside shift change report given to Jayden Stinson (oncoming nurse) by Ed Singh RN (offgoing nurse). Report included the following information SBAR, Kardex, Intake/Output, MAR, Recent Results, and Cardiac Rhythm NSR    Shift worked:  [id number]     Shift summary and any significant changes:     PRN melatonin and Percocet given 1x     Concerns for physician to address:       Zone phone for oncoming shift:          Activity:  Activity Level: Up with Assistance  Number times ambulated in hallways past shift: 0  Number of times OOB to chair past shift: 0    Cardiac:   Cardiac Monitoring: Yes      Cardiac Rhythm: Sinus Héctor    Access:  Current line(s): PIV     Genitourinary:   Urinary status: villegas    Respiratory:   O2 Device: None (Room air)  Chronic home O2 use?: NO  Incentive spirometer at bedside: YES       GI:  Last Bowel Movement Date: 08/27/22  Current diet:  ADULT DIET Regular; 4 carb choices (60 gm/meal)  Passing flatus: YES  Tolerating current diet: YES       Pain Management:   Patient states pain is manageable on current regimen: YES    Skin:  Torrey Score: 19  Interventions: increase time out of bed    Patient Safety:  Fall Score:  Total Score: 4  Interventions: bed/chair alarm, assistive device (walker, cane, etc), gripper socks, and pt to call before getting OOB  High Fall Risk: Yes    Length of Stay:  Expected LOS: 3d 2h  Actual LOS: 3      Ed Singh, RN

## 2022-09-01 VITALS
TEMPERATURE: 98.1 F | OXYGEN SATURATION: 93 % | DIASTOLIC BLOOD PRESSURE: 63 MMHG | BODY MASS INDEX: 30.87 KG/M2 | HEIGHT: 67 IN | RESPIRATION RATE: 12 BRPM | HEART RATE: 92 BPM | SYSTOLIC BLOOD PRESSURE: 98 MMHG | WEIGHT: 196.65 LBS

## 2022-09-01 LAB
BACTERIA SPEC CULT: ABNORMAL
BACTERIA SPEC CULT: ABNORMAL
CC UR VC: ABNORMAL
GLUCOSE BLD STRIP.AUTO-MCNC: 130 MG/DL (ref 65–117)
GLUCOSE BLD STRIP.AUTO-MCNC: 141 MG/DL (ref 65–117)
SERVICE CMNT-IMP: ABNORMAL

## 2022-09-01 PROCEDURE — 97530 THERAPEUTIC ACTIVITIES: CPT

## 2022-09-01 PROCEDURE — 74011250636 HC RX REV CODE- 250/636: Performed by: SURGERY

## 2022-09-01 PROCEDURE — 74011250637 HC RX REV CODE- 250/637: Performed by: SURGERY

## 2022-09-01 PROCEDURE — 82962 GLUCOSE BLOOD TEST: CPT

## 2022-09-01 PROCEDURE — 74011250637 HC RX REV CODE- 250/637: Performed by: NURSE PRACTITIONER

## 2022-09-01 PROCEDURE — 74011000258 HC RX REV CODE- 258: Performed by: SURGERY

## 2022-09-01 RX ORDER — BUSPIRONE HYDROCHLORIDE 10 MG/1
10 TABLET ORAL 3 TIMES DAILY
Qty: 90 TABLET | Refills: 11 | Status: SHIPPED
Start: 2022-09-01

## 2022-09-01 RX ORDER — ALBUTEROL SULFATE 90 UG/1
2 AEROSOL, METERED RESPIRATORY (INHALATION)
COMMUNITY

## 2022-09-01 RX ORDER — LANOLIN ALCOHOL/MO/W.PET/CERES
325 CREAM (GRAM) TOPICAL
Qty: 30 TABLET | Refills: 11 | Status: SHIPPED | OUTPATIENT
Start: 2022-09-01

## 2022-09-01 RX ORDER — QUETIAPINE FUMARATE 50 MG/1
150 TABLET, FILM COATED ORAL
COMMUNITY

## 2022-09-01 RX ORDER — ASCORBIC ACID 250 MG
250 TABLET ORAL DAILY
Qty: 30 TABLET | Refills: 11 | Status: SHIPPED
Start: 2022-09-01

## 2022-09-01 RX ORDER — HYDRALAZINE HYDROCHLORIDE 25 MG/1
25 TABLET, FILM COATED ORAL 3 TIMES DAILY
COMMUNITY
End: 2022-09-01

## 2022-09-01 RX ORDER — METOPROLOL TARTRATE 25 MG/1
12.5 TABLET, FILM COATED ORAL 2 TIMES DAILY
Qty: 30 TABLET | Refills: 11 | Status: SHIPPED
Start: 2022-09-01

## 2022-09-01 RX ORDER — FOLIC ACID 1 MG/1
1 TABLET ORAL DAILY
COMMUNITY

## 2022-09-01 RX ORDER — DULOXETIN HYDROCHLORIDE 30 MG/1
30 CAPSULE, DELAYED RELEASE ORAL DAILY
Qty: 30 CAPSULE | Refills: 11 | Status: SHIPPED
Start: 2022-09-01

## 2022-09-01 RX ORDER — NITROFURANTOIN 25; 75 MG/1; MG/1
100 CAPSULE ORAL 2 TIMES DAILY
Qty: 10 CAPSULE | Refills: 0 | Status: SHIPPED | OUTPATIENT
Start: 2022-09-01 | End: 2022-09-06

## 2022-09-01 RX ORDER — QUETIAPINE FUMARATE 50 MG/1
50 TABLET, FILM COATED ORAL DAILY
COMMUNITY

## 2022-09-01 RX ORDER — TRAMADOL HYDROCHLORIDE 50 MG/1
50 TABLET ORAL
Qty: 20 TABLET | Refills: 0 | Status: SHIPPED | OUTPATIENT
Start: 2022-09-01 | End: 2022-09-06

## 2022-09-01 RX ADMIN — DULOXETINE HYDROCHLORIDE 30 MG: 30 CAPSULE, DELAYED RELEASE ORAL at 18:10

## 2022-09-01 RX ADMIN — OXYCODONE AND ACETAMINOPHEN 2 TABLET: 5; 325 TABLET ORAL at 01:44

## 2022-09-01 RX ADMIN — MIDODRINE HYDROCHLORIDE 5 MG: 5 TABLET ORAL at 12:18

## 2022-09-01 RX ADMIN — ASPIRIN 81 MG: 81 TABLET, COATED ORAL at 08:55

## 2022-09-01 RX ADMIN — PANTOPRAZOLE SODIUM 40 MG: 40 TABLET, DELAYED RELEASE ORAL at 08:56

## 2022-09-01 RX ADMIN — OXYCODONE AND ACETAMINOPHEN 2 TABLET: 5; 325 TABLET ORAL at 18:03

## 2022-09-01 RX ADMIN — FERROUS SULFATE TAB 325 MG (65 MG ELEMENTAL FE) 325 MG: 325 (65 FE) TAB at 18:10

## 2022-09-01 RX ADMIN — Medication 400 MG: at 08:56

## 2022-09-01 RX ADMIN — OXYCODONE AND ACETAMINOPHEN 2 TABLET: 5; 325 TABLET ORAL at 09:57

## 2022-09-01 RX ADMIN — MIDODRINE HYDROCHLORIDE 5 MG: 5 TABLET ORAL at 18:10

## 2022-09-01 RX ADMIN — OXYCODONE AND ACETAMINOPHEN 2 TABLET: 5; 325 TABLET ORAL at 14:05

## 2022-09-01 RX ADMIN — MEROPENEM 1 G: 1 INJECTION, POWDER, FOR SOLUTION INTRAVENOUS at 04:39

## 2022-09-01 RX ADMIN — BUSPIRONE HYDROCHLORIDE 10 MG: 5 TABLET ORAL at 18:10

## 2022-09-01 RX ADMIN — QUETIAPINE FUMARATE 50 MG: 25 TABLET ORAL at 08:54

## 2022-09-01 RX ADMIN — DULOXETINE HYDROCHLORIDE 30 MG: 30 CAPSULE, DELAYED RELEASE ORAL at 08:55

## 2022-09-01 RX ADMIN — BUSPIRONE HYDROCHLORIDE 10 MG: 5 TABLET ORAL at 08:55

## 2022-09-01 RX ADMIN — RIVAROXABAN 15 MG: 15 TABLET, FILM COATED ORAL at 08:55

## 2022-09-01 RX ADMIN — MIDODRINE HYDROCHLORIDE 5 MG: 5 TABLET ORAL at 08:56

## 2022-09-01 RX ADMIN — FERROUS SULFATE TAB 325 MG (65 MG ELEMENTAL FE) 325 MG: 325 (65 FE) TAB at 08:55

## 2022-09-01 RX ADMIN — PANTOPRAZOLE SODIUM 40 MG: 40 TABLET, DELAYED RELEASE ORAL at 18:10

## 2022-09-01 NOTE — DISCHARGE SUMMARY
Vascular Surgery Discharge Summary     Patient ID:  Azalia Banks  [id number]  89 y.o.  1960    Admitting Provider: Abhay Monreal MD  Discharging Provider: Abhay Monreal MD    Admit Date: 8/29/2022    Discharge Date: 9/1/2022    Discharge Diagnoses:    Bilateral lower extremity arterial thrombosis POA   Peripheral arterial disease POA   Ulceration of the bilateral toes POA  Hx of right second toe amputation POA   Diabetic neuropathy POA   Former smoker POA   -Status post right arteriogram 8/22/22 prior to presenting  -Status post left TONY => DP bypass w/ vein 8/29/22  -Xarelto initiated at renal dosing (Patient's insurance will not cover Eliquis)  -CTA of the C/A/P was unremarkable for an embolic source  -Continue ASA and statin   -gabapentin allergy: hives   Post procedure follow up in two weeks  Outpatient follow up in 2-3 weeks with podiatrist    Suspected paroxsymal atrial fibrillation POA  -will need outpatient Holter monitor & echocardiogram  Recurrent near syncope POA  Post procedure hypotension POA    Hypertensive disorder POA  -labile  Orthostatic hypotension POA  POTS POA  Coronary artery disease POA  Chronic diastolic CHF POA  -compensated during admission   Hyperlipidemia POA  -ASA & statin   -Norvasc and Hydralazine discontinued as patient is not compliant w/ those medications  -Metoprolol continued at a lower dose with parameters  -allergy to ACE: anaphylaxis  Outpatient follow up with Cardiologist     Urinary retention POA  Recurrent urinary tract infections POA  Hx of VRE POA  -discharged with villegas  -straight cath urine culture significant for 80,000 gram negative rods   -treated with IV Meropenem while admitted   Outpatient follow up with Urology in 1-2 weeks     Recurrent falls POA  -multifactorial   Dementia POA   -at neurological baseline  Hx of brain aneursym POA  -2.5 mm   Hearing loss POA    History of stroke POA  -right hemispheric stroke 3/2021  Depression POA  Anxiety POA  PTSD POA  -Seroquel, Cymbalta, & Buspar continues at home doses   -Neurologist/psychiatrist Sherman Hurt MD (outpatient follow up 12/8/22)    Alcoholism POA  -w/o s/s of withdrawal during admission   Alcoholic cirrhosis POA   -LFTs & PLTs currently w/in nl limits   Chronic hypomagnesemia POA  -2.0 w/ supplementation   Folate deficiency POA  B12 deficiency POA      Diabetes mellitus w/ hyperglycemia POA  Obesity POA   -HA1c 6.4  -currently managed with diet   Diabetic retinopathy POA  Macular degeneration POA     Chronic renal disease stage IV POA  -at baseline of 2.0  -Nephrologist Hussain Menezes MD   Anemia of chronic disease POA  Iron deficiency POA   -ferrous sulfate & vit C   -relatively stable     GERD POA  Hiatal hernia POA   Hx of PUD POA  -Protonix   IBS POA     Chronic back pain POA   Lumbar radiculopathy POA    Procedures during admission:  Left TONY => DP bypass with vein (8/29/22)      Hospital Course:   Ms. Janet Marion has a pmhx significant for DM w/ neuropathy, obesity, CAD, HTN, HLD, orthostatic hypotension, diastolic CHF, CKD, alcoholism, cirrhosis, dementia, brain aneurysm, CVA, GERD, IBS, anxiety, and CBP. She has chronic dizziness attributed to POTS disease. She is a former smoker. She was previously taking Plavix which was stopped for PUD. She is admitted to the s/p a left TONY => DP bypass with vein (8/29/22) after presenting with blue toe syndrome of the bilateral feet & a left leg arterial occlusion. She is s/p left leg arteriogram 8/16/22 and right leg arteriogram 8/22/22. She is s/p a left partial second toe amputation 6/2022. Her postprocedure course is complicated by hypotension. After discussion with the patient she is a longstanding history of orthostatic hypotension and took midodrine for many years. She reports that it was stopped 1 year ago. Since her visit to the vascular lab last week she actually had syncopal episodes x2 resulting in a fall.   She has a hx of recurrent falls and was admitted to [address] [place] [address] overnight. She is non-complaint with Norvasc and Hydralazine. She is taking metoprolol but not on a regular basis. During admission she required a brief period of vasopressor support that was weaned off with midodrine. On day of discharge the midodrine was stopped and she was resumed on metoprolol at a lower dose with parameters. Her hydralazine and Norvasc has been discontinued. During admission she was noted to have urinary retention of > 700 cc. It persisted despite straight cath. Prior to discharge a villegas catheter was placed. After review of the EMR she has a hx of VRE UTI. Her straight cath pre-procedure urine cx was significant for ESBL producing 80,000 gram negative rods. She was treated with a 4 day course of IV Meropenem and converted to oral antibxs at discharge to complete a short course of Macrobid per Urology recommendations. Arrangements have been made for her to follow up outpatient with Urology. See above for details regarding the remainder of her comorbid conditions. On day of discharge she was ambulating. Her incisions were dry and intact. Her feet were warm and perfused. She has a villegas. She was at her mental baseline.      Pertinent Results:   Recent Results (from the past 24 hour(s))   GLUCOSE, POC    Collection Time: 08/31/22  4:42 PM   Result Value Ref Range    Glucose (POC) 143 (H) 65 - 117 mg/dL    Performed by Ashley Phillips PCT    GLUCOSE, POC    Collection Time: 08/31/22  9:00 PM   Result Value Ref Range    Glucose (POC) 168 (H) 65 - 117 mg/dL    Performed by Onesimo Ayala PCT    GLUCOSE, POC    Collection Time: 09/01/22  8:08 AM   Result Value Ref Range    Glucose (POC) 130 (H) 65 - 117 mg/dL    Performed by Annamaria Banks RN    GLUCOSE, POC    Collection Time: 09/01/22 11:21 AM   Result Value Ref Range    Glucose (POC) 141 (H) 65 - 117 mg/dL    Performed by Annamaria Banks RN        Vital signs: Patient Vitals for the past 24 hrs:   BP Temp Pulse Resp SpO2   09/01/22 1218 131/74 -- 94 -- --   09/01/22 1035 116/64 98.2 °F (36.8 °C) 91 15 92 %   09/01/22 0856 128/74 -- 94 -- --   09/01/22 0748 126/76 97.6 °F (36.4 °C) 93 20 94 %   09/01/22 0300 122/64 -- 83 14 94 %   08/31/22 2315 102/60 97.7 °F (36.5 °C) 86 15 93 %   08/31/22 1945 116/73 97.8 °F (36.6 °C) 89 16 94 %   08/31/22 1749 116/77 -- 96 -- --   08/31/22 1500 133/67 97.8 °F (36.6 °C) 86 13 91 %       Patient Weight:   Last 3 Recorded Weights in this Encounter    08/29/22 1420   Weight: 89.2 kg (196 lb 10.4 oz)       Consults: None    Patient Condition at Discharge: stable    Disposition: Home with [facility] NSG and PT     Patient Instructions:   Current Discharge Medication List        START taking these medications    Details   rivaroxaban (XARELTO) 15 mg tab tablet Take 1 Tablet by mouth daily. This replaces previously prescribed dose of 20mg oral daily  Indications: prevention of thromboembolism in paroxysmal atrial fibrillation  Qty: 30 Tablet, Refills: 5      traMADoL (Ultram) 50 mg tablet Take 1 Tablet by mouth every six (6) hours as needed for Pain for up to 5 days. Max Daily Amount: 200 mg. Qty: 20 Tablet, Refills: 0   Associated Diagnoses: Severe peripheral arterial disease (HCC)   Macrobid 100 mg tablet 1 tablet oral BID x 5 days      CONTINUE these medications which have CHANGED    Details   ascorbic acid, vitamin C, (VITAMIN C) 250 mg tablet Take 1 Tablet by mouth daily. Qty: 30 Tablet, Refills: 11      ferrous sulfate 325 mg (65 mg iron) tablet Take 1 Tablet by mouth Daily (before breakfast). Qty: 30 Tablet, Refills: 11      busPIRone (BUSPAR) 10 mg tablet Take 1 Tablet by mouth three (3) times daily. Qty: 90 Tablet, Refills: 11      DULoxetine (CYMBALTA) 30 mg capsule Take 1 Capsule by mouth daily. Qty: 30 Capsule, Refills: 11      metoprolol tartrate (LOPRESSOR) 25 mg tablet Take 0.5 Tablets by mouth two (2) times a day.  HOLD for systolic BP < 90 or HR < 60.  Qty: 30 Tablet, Refills: 11           CONTINUE these medications which have NOT CHANGED    Details   albuterol (PROVENTIL HFA, VENTOLIN HFA, PROAIR HFA) 90 mcg/actuation inhaler Take 2 Puffs by inhalation every four (4) hours as needed for Wheezing, Shortness of Breath or Respiratory Distress. folic acid (FOLVITE) 1 mg tablet Take 1 mg by mouth daily. QUEtiapine (SEROqueL) 50 mg tablet Take 50 mg by mouth daily. QUEtiapine (SEROqueL) 50 mg tablet Take 150 mg by mouth nightly. vitamin B complex (B COMPLEX PO) Take 1 Tablet by mouth daily. aspirin delayed-release 81 mg tablet Take 81 mg by mouth daily. magnesium oxide (MAG-OX) 400 mg tablet Take 400 mg by mouth daily. pantoprazole (PROTONIX) 40 mg tablet Take 40 mg by mouth two (2) times a day. polyethylene glycol (MIRALAX) 17 gram packet Take 17 g by mouth daily. rosuvastatin (CRESTOR) 40 mg tablet Take 40 mg by mouth nightly. STOP taking these medications       hydrALAZINE (APRESOLINE) 25 mg tablet Comments:   Reason for Stopping: hypotension        amLODIPine (Norvasc) 5 mg tablet Comments:   Reason for Stopping: hypotension        acetaminophen (TYLENOL) 500 mg tablet Comments:   Reason for Stopping: replaced w/ Tramadol        melatonin 3 mg tablet Comments:   Reason for Stopping: taking Seroquel for sleep          Diet: Diabetic Diet    Discharge Instructions After Vascular Surgery   Home care  Check your incisions every day for signs of infection such as swelling, redness, warmth, or drainage. Dry dressing changes daily to surgical incisions. Dont bathe or soak in a tub or go swimming until your incisions are well healed (usually at least 3 weeks). You can shower to keep your incisions clean. Just make sure you dry them well afterward. Routine villegas catheter care. Activity  Dont drive. Expect to start walking soon after surgery.  Walking helps reduce swelling and helps your incision heal. Dont stand or sit with your feet down for long. When you sit, raise your feet as high as you comfortably can. Keep the left leg elevated as needed for swelling. Take your medicines exactly as directed. Dont skip doses. Avoid skin burns by testing the temperature of shower water before you get in. Wear slippers or shoes when walking. Dont go barefoot or wear open-toed shoes. Follow-up  See your doctor to have your staples removed 2-3 weeks after your surgery. When to call your healthcare provider   Call your provider right away if you have any of the following:  Fever of 100.4°F (38°C)  Signs of infection (redness, swelling, or warmth at the incision sites)  Drainage from your incisions  Changes in color, temperature, feeling, or movement in either leg or foot  Increasing pain or numbness in your foot or leg  Leg swelling that doesn't get better overnight  Chest pain or trouble breathing    Long-term changes at home  Eat a healthy, low-fat, low cholesterol, and low calorie diet. Maintain your ideal body weight. After you have recovered from surgery, try to exercise more, especially walking. If you smoke, ask your primary doctor for help quitting.       Please call the office at [phone] for any issues    Follow-up Information       Follow up With Specialties Details Why Contact Info    Brenda Mancia MD Vascular Surgery Follow up in 2 week(s)  [address]  [phone]      Patient's cardiologist  Follow up in 2 week(s)      [facility]  Follow up 1-2 weeks for voiding trial/urinary retention/recurrent UTIs [address], Keaton Salcedo MD Podiatry Follow up in 2 week(s)  [facility]      Thomasine Runner, NP Nurse Practitioner Follow up in 1 week(s)  [address] [place] [address] [facility]  Steve Rosales MD Psychiatry Follow up on 12/8/2022  [facility] [phone]              Signed:  Art Board, FRANTZ  9/1/2022  12:49 PM

## 2022-09-01 NOTE — PROGRESS NOTES
0730: Bedside shift change report given to [facility] RN (oncoming nurse) by Ricardo Mccallum RN (offgoing nurse). Report included the following information SBAR, Kardex, Intake/Output, and MAR.      0957: PRN Percocet given    1015: Order put in for a consult to wound care for pt's post op incisions. This RN also left a voice mail to wound care just to notify them of the order and give brief background of situation. 1145: Wound care RN Leila Dakins stopped and spoke with this RN in the hallway. She said to call vascular regarding wound care orders. 1200: This RN called Vascular surgery and spoke with Mendel Kansas NP. She put in an order for dry dressing changes and also noted that Dr. Ja Colindres had come in yesterday and changed the dressings, and mentioned that she was waiting for urology to follow up on the pt.     1345: This RN changed pt's surgical dressings (L groin site, L shin site, and L anterior foot site) and wiped them off with a CHG wipe. All sites clean, dry, and intact with no s/x of infection or drainage. 1405: PRN percocet given    [id number]: PT arrived to work with pt. This RN accompanied PT to bedside to measure blood pressures and assess whether patient was orthostatic. Pt's initial blood pressure lying down was 147/74 with a HR of 93 bpm. When pt moved to sit on the edge of the bed, her BP dropped to 96/61 with an HR of 97 bpm, pt reports no s/x. Finally, when patient stood up, her pressure dropped further to 72/51 with a HR of 101 bpm. MD made aware. Pt put back in bed and resting comfortably, BP increased back up to 135/78. Pt showing no s/x of distress or pain. 1445: Dr. Ja Colindres at bedside, states that pt is stable and ready for d/c. Did not recommend any changes in medication for her hypotension. Recommended for pt to change positions slowly and to exercise more. Will educate patient further about rising slowly and discharge instructions.       1800: PRN percocet given    1830: RN at bedside and discussed discharge information and provided teaching for pt and son. Offered opportunity for questions, no further questions. Pt's son is here to transport her home. Pt is stable with no s/x of distress or pain. Pt taken down in wheelchair by Tech to go home.

## 2022-09-01 NOTE — H&P
History and Physical    Subjective:     Lenny Guzman is a 60 y.o. female who has severe PAD and osteomyelitis of Lt 3rd toe. Past Medical History:   Diagnosis Date    Aneurysm ([facility]     Anxiety 2010    Brain aneurysm     2.5 mm    CAD (coronary artery disease)     carotid stenosis    Chronic kidney disease     CVA (cerebral vascular accident) ([facility] 2021    right hemispheric    DM (diabetes mellitus[facility] 2010    GERD (gastroesophageal reflux disease)     HTN (hypertension) 2010    Hyperlipidemia 2010    IBS (irritable bowel syndrome)     POTS (postural orthostatic tachycardia syndrome)     Psychiatric disorder     PUD (peptic ulcer disease)     Thromboembolus ([facility]       Past Surgical History:   Procedure Laterality Date    HX AMPUTATION TOE Right     HX AMPUTATION TOE Left 2022    partial 2nd toe    HX COLONOSCOPY      IR CHOLECYSTOSTOMY PERCUTANEOUS       Family History   Problem Relation Age of Onset    Heart Disease Mother     Cancer Father     Liver Disease Sister     Liver Disease Brother       Social History     Tobacco Use    Smoking status: Former     Types: Cigarettes     Quit date:      Years since quittin.6    Smokeless tobacco: Never   Substance Use Topics    Alcohol use: Yes     Comment: 4 drinks/ wk       Prior to Admission medications    Medication Sig Start Date End Date Taking? Authorizing Provider   busPIRone (BUSPAR) 10 mg tablet Take 1 Tablet by mouth two (2) times a day. 22  Yes Jacquelyn Wong NP   QUEtiapine (SEROquel) 50 mg tablet Take 1 Tablet by mouth daily. 22  Yes Jacquelyn Wong NP   amLODIPine (Norvasc) 5 mg tablet Take 5 mg by mouth daily. Yes Provider, Historical   QUEtiapine (SEROquel) 200 mg tablet Take 200 mg by mouth nightly. Yes Provider, Historical   vitamin B complex (B COMPLEX PO) Take 1 Tablet by mouth daily.    Yes Provider, Historical   ascorbic acid, vitamin C, (VITAMIN C) 250 mg tablet Take 250 mg by mouth daily. Yes Provider, Historical   metoprolol tartrate (LOPRESSOR) 25 mg tablet Take 1 Tab by mouth every twelve (12) hours. 3/26/19  Yes Rani Mireles MD   acetaminophen (TYLENOL) 500 mg tablet Take 1,000 mg by mouth every six (6) hours as needed for Pain. Yes Provider, Historical   aspirin delayed-release 81 mg tablet Take 81 mg by mouth daily. Yes Provider, Historical   DULoxetine (CYMBALTA) 30 mg capsule Take 30 mg by mouth two (2) times a day. Yes Provider, Historical   ferrous sulfate 325 mg (65 mg iron) tablet Take 325 mg by mouth two (2) times daily (with meals). Yes Provider, Historical   magnesium oxide (MAG-OX) 400 mg tablet Take 400 mg by mouth daily. Yes Provider, Historical   melatonin 3 mg tablet Take 10 mg by mouth nightly as needed. Yes Provider, Historical   pantoprazole (PROTONIX) 40 mg tablet Take 40 mg by mouth two (2) times a day. Yes Provider, Historical   polyethylene glycol (MIRALAX) 17 gram packet Take 17 g by mouth daily. Yes Provider, Historical   rosuvastatin (CRESTOR) 40 mg tablet Take 40 mg by mouth nightly.    Yes Provider, Historical   OTHER Graded compression stocking one pair for both legs 3/26/19   Rani Mireles MD     Allergies   Allergen Reactions    Gabapentin Anaphylaxis     Swelling and rash, hives      Ace Inhibitors Other (comments)     angioedema    [place] Plus Other (comments)     Increased blood pressure 3/21/19- pt reports no allergy      Anexsia [Hydrocodone-Acetaminophen] Unknown (comments)     Pt reports no allergy      Pepto-Bismol [Bismuth Subsalicylate] Other (comments)     Tongue color change          Review of Systems:  Denies CP/SOB    Objective:     Physical Exam:   Visit Vitals  /73 (BP 1 Location: Right upper arm, BP Patient Position: At rest;Semi fowlers)   Pulse 89   Temp 98.7 °F (37.1 °C)   Resp 16   Ht 5' 7\" (1.702 m)   Wt 89.2 kg (196 lb 10.4 oz)   LMP 05/19/2009   SpO2 94%   BMI 30.80 kg/m²     General: Alert, cooperative, no distress, appears stated age. Head:  Normocephalic, without obvious abnormality, atraumatic. Neck: Supple, symmetrical, trachea midline, no adenopathy, thyroid: no enlargement/tenderness/nodules, no carotid bruit and no JVD. Lungs:   Clear to auscultation bilaterally. Heart:  Regular rate and rhythm, S1, S2 normal, no murmur, click, rub or gallop. Abdomen:   Soft, non-tender. Bowel sounds normal. No masses,  No organomegaly. Extremities: Extremities normal, no cyanosis or edema. Ulcer on Lt 3rd toe   Pulses: 2+ Lt fem/pop, no pedal.           Neurologic: Normal strength, sensation throughout.        Assessment:     Active Problems:    Critical lower limb ischemia ([address] (8/29/2022)        Plan:     Lt AT-DP bypass w/ GSV    Signed By: Haroldo Black MD     August 31, 2022

## 2022-09-01 NOTE — PROGRESS NOTES
Problem: Mobility Impaired (Adult and Pediatric)  Goal: *Acute Goals and Plan of Care (Insert Text)  Description: FUNCTIONAL STATUS PRIOR TO ADMISSION: Most recently, no device use per pt, and in the past, RW and/or SPC use. Pt reports recent falls however secondary to syncopal episodes and \"furniture walking. \"      HOME SUPPORT PRIOR TO ADMISSION: The patient lived with her son but did not require assist per pt report. Physical Therapy Goals  Initiated 8/30/2022  1. Patient will move from supine to sit and sit to supine , scoot up and down, and roll side to side in bed with modified independence within 7 day(s). 2.  Patient will transfer from bed to chair and chair to bed with supervision/set-up using the least restrictive device within 7 day(s). 3.  Patient will perform sit to stand with supervision/set-up within 7 day(s). 4.  Patient will ambulate with supervision/set-up for 200 feet with the least restrictive device within 7 day(s). 5.  Patient will ascend/descend 4 stairs with handrail(s) with minimal assistance/contact guard assist within 7 day(s). Outcome: Progressing Towards Goal   PHYSICAL THERAPY TREATMENT  Patient: Deng Murry (30 y.o. female)  Date: 9/1/2022  Diagnosis: Critical lower limb ischemia (HCC) [I70.229] <principal problem not specified>  Procedure(s) (LRB):  LEFT ANTERIOR TIBIAL TO DORSAL PEDAL BYPASS WITH VEIN (Left) 3 Days Post-Op  Precautions: Fall, Contact, WBAT  Chart, physical therapy assessment, plan of care and goals were reviewed. ASSESSMENT  Patient continues with skilled PT services and is not progressing towards goals, due to persistent orthostatic hypotension. Pt moving well with what she is able to do(bed mobility), but once standing her BP decreases to 72/51. Pt is asymptomatic, but it isn't safe to progress her mobility further this session. Will continue to follow pt while she is here in the hospital and continue to recommend HHPT at discharge. Current Level of Function Impacting Discharge (mobility/balance): mod I to S    Other factors to consider for discharge: orthostatic hyptension         PLAN :  Patient continues to benefit from skilled intervention to address the above impairments. Continue treatment per established plan of care. to address goals. Recommendation for discharge: (in order for the patient to meet his/her long term goals)  Physical therapy at least 2 days/week in the home     This discharge recommendation:  Has been made in collaboration with the attending provider and/or case management    IF patient discharges home will need the following DME: patient owns DME required for discharge       SUBJECTIVE:   Patient stated Hanna Garay got to get home to my puppies.     OBJECTIVE DATA SUMMARY:   Critical Behavior:  Neurologic State: Alert  Orientation Level: Oriented X4  Cognition: Appropriate decision making, Follows commands, Appropriate for age attention/concentration, Appropriate safety awareness  Safety/Judgement: Awareness of environment, Insight into deficits  Functional Mobility Training:  Bed Mobility:  Rolling: Modified independent  Supine to Sit: Modified independent              Transfers:  Sit to Stand: Supervision  Stand to Sit: Supervision               Balance:  Sitting: Intact  Standing: Intact  Standing - Static: Good  Standing - Dynamic : Not tested  Ambulation/Gait Training:   Unable due to orthostatic hypotension        Activity Tolerance:   Fair, orthostatic hyotension    After treatment patient left in no apparent distress:   Supine in bed, Call bell within reach, and Side rails x 3    COMMUNICATION/COLLABORATION:   The patients plan of care was discussed with: Registered nurse.      Daryle Shelter, PT   Time Calculation: 16 mins

## 2022-09-01 NOTE — PROGRESS NOTES
Pt is cleared for d/c from a CM standpoint. Transition of Care Plan:     RUR:  12% low risk for readmission  Disposition:  Home with HH ([facility] [facility] accepted)  Follow up appointments: PCP/Specialists  DME needed: Pt owns needed DME for discharge  Transportation at Discharge:  Son to transport  Keys or means to access home:    Son has access    IM Medicare Letter: Commercial insurance  Caregiver Contact:  Son, Zi Robins  Discharge Caregiver contacted prior to discharge? Family will be contacted prior to discharge  Care Conference needed?:  n/a    CM acknowledged d/c order. CM met with pt at bedside to discuss. Pt in agreement. 2IM reviewed and placed in chart. Pt's son will transport her home. CM informed [facility] pt is discharging. CM made follow up appt with Dr. Hannah Grant. Care Management Interventions  PCP Verified by CM: Yes (1.5 months ago)  Mode of Transport at Discharge:  Other (see comment) (son)  Transition of Care Consult (CM Consult): [address]: No  Reason Outside [place]: Managed care specific requirement  Discharge Durable Medical Equipment: No  Physical Therapy Consult: Yes  Occupational Therapy Consult: Yes  Speech Therapy Consult: No  Support Systems: Child(dion)  Confirm Follow Up Transport: Family  The Patient and/or Patient Representative was Provided with a Choice of Provider and Agrees with the Discharge Plan?: Yes  Freedom of Choice List was Provided with Basic Dialogue that Supports the Patient's Individualized Plan of Care/Goals, Treatment Preferences and Shares the Quality Data Associated with the Providers?: Yes  Discharge Location  Patient Expects to be Discharged to[de-identified] Home with home health    KATHRINE Johnson  Care Management, [address]

## 2022-09-01 NOTE — DISCHARGE INSTRUCTIONS
Discharge Instructions After Vascular Surgery   Home care  Check your incisions every day for signs of infection such as swelling, redness, warmth, or drainage. Dry dressing changes daily to surgical incisions. Dont bathe or soak in a tub or go swimming until your incisions are well healed (usually at least 3 weeks). You can shower to keep your incisions clean. Just make sure you dry them well afterward. Routine villegas catheter care. Activity  Dont drive. Expect to start walking soon after surgery. Walking helps reduce swelling and helps your incision heal.   Dont stand or sit with your feet down for long. When you sit, raise your feet as high as you comfortably can. Keep the left leg elevated as needed for swelling. Take your medicines exactly as directed. Dont skip doses. Avoid skin burns by testing the temperature of shower water before you get in. Wear slippers or shoes when walking. Dont go barefoot or wear open-toed shoes. Follow-up  See your doctor to have your staples removed 2-3 weeks after your surgery. When to call your healthcare provider   Call your provider right away if you have any of the following:  Fever of 100.4°F (38°C)  Signs of infection (redness, swelling, or warmth at the incision sites)  Drainage from your incisions  Changes in color, temperature, feeling, or movement in either leg or foot  Increasing pain or numbness in your foot or leg  Leg swelling that doesn't get better overnight  Chest pain or trouble breathing    Long-term changes at home  Eat a healthy, low-fat, low cholesterol, and low calorie diet. Maintain your ideal body weight. After you have recovered from surgery, try to exercise more, especially walking. If you smoke, ask your primary doctor for help quitting.       Please call the office at [phone] for any issues

## 2022-09-06 LAB
GLUCOSE BLD STRIP.AUTO-MCNC: NORMAL MG/DL (ref 65–117)
SERVICE CMNT-IMP: NORMAL

## 2022-09-12 NOTE — OP NOTES
[address]  OPERATIVE REPORT    Name:  Darline Ahn  MR#:  [id number]  :  1960  ACCOUNT #:  [de-identified]  DATE OF SERVICE:  2022    PREOPERATIVE DIAGNOSIS:  Peripheral arterial disease with ulceration and osteomyelitis of the left foot. POSTOPERATIVE DIAGNOSIS:  Peripheral arterial disease with ulceration and osteomyelitis of the left foot. PROCEDURE PERFORMED:  Left anterior tibial artery to dorsalis pedis artery bypass with saphenous vein. SURGEON:  Jeanine Miller MD    ASSISTANT:  None. ANESTHESIA:  General.    COMPLICATIONS:  None. SPECIMENS REMOVED:  None. IMPLANTS:  None. ESTIMATED BLOOD LOSS:  200 mL. DRAINS:  None. INDICATIONS:  The patient is a 50-year-old female who has ulceration and osteomyelitis of a toe on the left foot. Arteriograms revealed that the anterior tibial artery is the dominant runoff vessel to her foot. The posterior tibial although patent is very diminutive as are the plantar arteries. The anterior tibial artery is normal until just above the ankle mortise where there is an abrupt cutoff which is angiographically consistent with an embolic occlusion. The dorsalis pedis artery is reconstituted and is a normal vessel. This was originally treated with atherectomy and angioplasty, but had rapid recurrence of the occlusion. The patient now presents for distal anterior tibial artery to dorsalis pedis artery bypass prior to planned toe amputation. PROCEDURE:  After informed consent was obtained, the patient was given intravenous antibiotics, both for surgical prophylaxis for the skin incision as well as an apparent urinary tract infection with ESBL E. coli. The patient was taken to the operating room and after induction of adequate general anesthesia, the left leg and foot were prepped and draped as a sterile field.   So an incision on the distal anterolateral lower leg, the distal anterior tibial artery was dissected free and encircled with vessel loops in a level where there was an excellent Doppler signal.  An incision was made on the dorsum of the foot and the dorsalis pedis artery was dissected free and appeared to be a reasonable target vessel. The greater saphenous vein was examined with ultrasound and appeared to be most suitable in the thigh. Through a single incision in the thigh. A suitable length of greater saphenous vein was harvested dividing all side branches between silk ties and clips. The vein was ligated at the proximal and distal extensive dissection after confirming that a sufficient length had been isolated and the intervening segment of vein was excised and flushed with heparinized saline. The vein was hemostatic. An antibiotic soaked lap pad was placed in the thigh incision and attention was directed to the proximal anastomosis. With adequate systemic heparin on board an end-to-side anastomosis was created between the reversed vein graft and the distal anterior tibial artery using running 6-0 Prolene suture. Upon completion, there was an excellent pulse in the vein graft. The vein was then tunneled subcutaneously to the incision on the dorsum of the foot. A longitudinal arteriotomy was created in the dorsalis pedis artery and there was actually occlusive fresh thrombus within the proximal aspect of the lumen. This was removed and a #2 Javier catheter was passed distally with good backbleeding and no further thrombus. An end-to-side anastomosis was then created between the vein graft and the dorsalis pedis artery using running 6-0 Prolene suture. Upon completion, flow was released to the foot. There was excellent Doppler flow in the circuit. All three wounds were irrigated with antibiotic irrigation and were hemostatic. The wound on the dorsum of the foot was closed with 3-0 Vicryl suture and then interrupted 3-0 nylon vertical mattress sutures.   The distal lower leg incision was closed with 3-0 Vicryl suture and skin staples. The thigh incision was closed with 2-0 Vicryl and then 3-0 Vicryl suture and then skin staples. A good Doppler flow was confirmed in the bypass and in the native dorsalis pedis artery distal to the bypass. Dry dressings were applied and the patient was extubated and transferred to the PACU in stable condition. All counts were correct.       Maico Brito MD      [id number]  D:  09/11/2022 21:55  T:  09/11/2022 22:40  JOB #:  [id number]

## 2023-01-03 PROCEDURE — 85025 COMPLETE CBC W/AUTO DIFF WBC: CPT

## 2023-01-03 PROCEDURE — 99285 EMERGENCY DEPT VISIT HI MDM: CPT

## 2023-01-03 PROCEDURE — 93005 ELECTROCARDIOGRAM TRACING: CPT

## 2023-01-03 PROCEDURE — 80053 COMPREHEN METABOLIC PANEL: CPT

## 2023-01-03 PROCEDURE — 36415 COLL VENOUS BLD VENIPUNCTURE: CPT

## 2023-01-04 ENCOUNTER — APPOINTMENT (OUTPATIENT)
Dept: GENERAL RADIOLOGY | Age: 63
DRG: 312 | End: 2023-01-04
Attending: EMERGENCY MEDICINE
Payer: MEDICARE

## 2023-01-04 ENCOUNTER — HOSPITAL ENCOUNTER (INPATIENT)
Age: 63
LOS: 1 days | Discharge: HOME | DRG: 312 | End: 2023-01-04
Attending: EMERGENCY MEDICINE | Admitting: STUDENT IN AN ORGANIZED HEALTH CARE EDUCATION/TRAINING PROGRAM
Payer: MEDICARE

## 2023-01-04 ENCOUNTER — APPOINTMENT (OUTPATIENT)
Dept: CT IMAGING | Age: 63
DRG: 312 | End: 2023-01-04
Attending: EMERGENCY MEDICINE
Payer: MEDICARE

## 2023-01-04 VITALS
HEART RATE: 80 BPM | RESPIRATION RATE: 16 BRPM | TEMPERATURE: 98.3 F | OXYGEN SATURATION: 97 % | HEIGHT: 67 IN | DIASTOLIC BLOOD PRESSURE: 101 MMHG | SYSTOLIC BLOOD PRESSURE: 140 MMHG | BODY MASS INDEX: 30.76 KG/M2 | WEIGHT: 196 LBS

## 2023-01-04 DIAGNOSIS — R55 SYNCOPE AND COLLAPSE: ICD-10-CM

## 2023-01-04 DIAGNOSIS — I95.1 ORTHOSTATIC HYPOTENSION: Primary | ICD-10-CM

## 2023-01-04 LAB
ALBUMIN SERPL-MCNC: 2.2 G/DL (ref 3.5–5)
ALBUMIN/GLOB SERPL: 0.6 {RATIO} (ref 1.1–2.2)
ALP SERPL-CCNC: 115 U/L (ref 45–117)
ALT SERPL-CCNC: 43 U/L (ref 12–78)
ANION GAP SERPL CALC-SCNC: 8 MMOL/L (ref 5–15)
APPEARANCE UR: ABNORMAL
AST SERPL-CCNC: 43 U/L (ref 15–37)
ATRIAL RATE: 65 BPM
BACTERIA URNS QL MICRO: ABNORMAL /HPF
BASOPHILS # BLD: 0 K/UL (ref 0–0.1)
BASOPHILS NFR BLD: 0 % (ref 0–1)
BILIRUB SERPL-MCNC: 0.6 MG/DL (ref 0.2–1)
BILIRUB UR QL: NEGATIVE
BUN SERPL-MCNC: 24 MG/DL (ref 6–20)
BUN/CREAT SERPL: 13 (ref 12–20)
CALCIUM SERPL-MCNC: 8.2 MG/DL (ref 8.5–10.1)
CALCULATED P AXIS, ECG09: -8 DEGREES
CALCULATED R AXIS, ECG10: -9 DEGREES
CALCULATED T AXIS, ECG11: 1 DEGREES
CHLORIDE SERPL-SCNC: 93 MMOL/L (ref 97–108)
CO2 SERPL-SCNC: 29 MMOL/L (ref 21–32)
COLOR UR: ABNORMAL
CREAT SERPL-MCNC: 1.81 MG/DL (ref 0.55–1.02)
DIAGNOSIS, 93000: NORMAL
DIFFERENTIAL METHOD BLD: ABNORMAL
EOSINOPHIL # BLD: 0.1 K/UL (ref 0–0.4)
EOSINOPHIL NFR BLD: 1 % (ref 0–7)
EPITH CASTS URNS QL MICRO: ABNORMAL /LPF
ERYTHROCYTE [DISTWIDTH] IN BLOOD BY AUTOMATED COUNT: 14.4 % (ref 11.5–14.5)
GLOBULIN SER CALC-MCNC: 3.9 G/DL (ref 2–4)
GLUCOSE SERPL-MCNC: 134 MG/DL (ref 65–100)
GLUCOSE UR STRIP.AUTO-MCNC: NEGATIVE MG/DL
HCT VFR BLD AUTO: 37.1 % (ref 35–47)
HGB BLD-MCNC: 12 G/DL (ref 11.5–16)
HGB UR QL STRIP: ABNORMAL
IMM GRANULOCYTES # BLD AUTO: 0 K/UL (ref 0–0.04)
IMM GRANULOCYTES NFR BLD AUTO: 0 % (ref 0–0.5)
KETONES UR QL STRIP.AUTO: NEGATIVE MG/DL
LEUKOCYTE ESTERASE UR QL STRIP.AUTO: ABNORMAL
LYMPHOCYTES # BLD: 3.4 K/UL (ref 0.8–3.5)
LYMPHOCYTES NFR BLD: 46 % (ref 12–49)
MCH RBC QN AUTO: 33.8 PG (ref 26–34)
MCHC RBC AUTO-ENTMCNC: 32.3 G/DL (ref 30–36.5)
MCV RBC AUTO: 104.5 FL (ref 80–99)
MONOCYTES # BLD: 0.4 K/UL (ref 0–1)
MONOCYTES NFR BLD: 6 % (ref 5–13)
NEUTS SEG # BLD: 3.5 K/UL (ref 1.8–8)
NEUTS SEG NFR BLD: 47 % (ref 32–75)
NITRITE UR QL STRIP.AUTO: NEGATIVE
NRBC # BLD: 0 K/UL (ref 0–0.01)
NRBC BLD-RTO: 0 PER 100 WBC
P-R INTERVAL, ECG05: 160 MS
PH UR STRIP: 5.5 [PH] (ref 5–8)
PLATELET # BLD AUTO: 139 K/UL (ref 150–400)
PMV BLD AUTO: 10.6 FL (ref 8.9–12.9)
POTASSIUM SERPL-SCNC: 3.9 MMOL/L (ref 3.5–5.1)
PROT SERPL-MCNC: 6.1 G/DL (ref 6.4–8.2)
PROT UR STRIP-MCNC: NEGATIVE MG/DL
Q-T INTERVAL, ECG07: 488 MS
QRS DURATION, ECG06: 102 MS
QTC CALCULATION (BEZET), ECG08: 507 MS
RBC # BLD AUTO: 3.55 M/UL (ref 3.8–5.2)
RBC #/AREA URNS HPF: ABNORMAL /HPF (ref 0–5)
SODIUM SERPL-SCNC: 130 MMOL/L (ref 136–145)
SP GR UR REFRACTOMETRY: 1.01
UA: UC IF INDICATED,UAUC: ABNORMAL
UROBILINOGEN UR QL STRIP.AUTO: 0.2 EU/DL (ref 0.2–1)
VENTRICULAR RATE, ECG03: 65 BPM
WBC # BLD AUTO: 7.4 K/UL (ref 3.6–11)
WBC URNS QL MICRO: ABNORMAL /HPF (ref 0–4)

## 2023-01-04 PROCEDURE — 74011250636 HC RX REV CODE- 250/636: Performed by: EMERGENCY MEDICINE

## 2023-01-04 PROCEDURE — 97165 OT EVAL LOW COMPLEX 30 MIN: CPT | Performed by: OCCUPATIONAL THERAPIST

## 2023-01-04 PROCEDURE — 97116 GAIT TRAINING THERAPY: CPT

## 2023-01-04 PROCEDURE — 72125 CT NECK SPINE W/O DYE: CPT

## 2023-01-04 PROCEDURE — 97161 PT EVAL LOW COMPLEX 20 MIN: CPT

## 2023-01-04 PROCEDURE — 70450 CT HEAD/BRAIN W/O DYE: CPT

## 2023-01-04 PROCEDURE — 81001 URINALYSIS AUTO W/SCOPE: CPT

## 2023-01-04 PROCEDURE — 74011250637 HC RX REV CODE- 250/637: Performed by: EMERGENCY MEDICINE

## 2023-01-04 PROCEDURE — 96360 HYDRATION IV INFUSION INIT: CPT

## 2023-01-04 PROCEDURE — 74011250637 HC RX REV CODE- 250/637: Performed by: STUDENT IN AN ORGANIZED HEALTH CARE EDUCATION/TRAINING PROGRAM

## 2023-01-04 PROCEDURE — 65270000046 HC RM TELEMETRY

## 2023-01-04 PROCEDURE — 74011000250 HC RX REV CODE- 250: Performed by: STUDENT IN AN ORGANIZED HEALTH CARE EDUCATION/TRAINING PROGRAM

## 2023-01-04 PROCEDURE — 97535 SELF CARE MNGMENT TRAINING: CPT | Performed by: OCCUPATIONAL THERAPIST

## 2023-01-04 PROCEDURE — 97162 PT EVAL MOD COMPLEX 30 MIN: CPT

## 2023-01-04 PROCEDURE — 71045 X-RAY EXAM CHEST 1 VIEW: CPT

## 2023-01-04 RX ORDER — LIDOCAINE 4 G/100G
3 PATCH TOPICAL EVERY 24 HOURS
Status: DISCONTINUED | OUTPATIENT
Start: 2023-01-04 | End: 2023-01-04 | Stop reason: HOSPADM

## 2023-01-04 RX ORDER — PANTOPRAZOLE SODIUM 40 MG/1
40 TABLET, DELAYED RELEASE ORAL 2 TIMES DAILY
Status: DISCONTINUED | OUTPATIENT
Start: 2023-01-04 | End: 2023-01-04 | Stop reason: HOSPADM

## 2023-01-04 RX ORDER — ONDANSETRON 2 MG/ML
4 INJECTION INTRAMUSCULAR; INTRAVENOUS
Status: DISCONTINUED | OUTPATIENT
Start: 2023-01-04 | End: 2023-01-04 | Stop reason: HOSPADM

## 2023-01-04 RX ORDER — MIDODRINE HYDROCHLORIDE 2.5 MG/1
TABLET ORAL
COMMUNITY
Start: 2022-09-21 | End: 2023-01-04

## 2023-01-04 RX ORDER — ACETAMINOPHEN 325 MG/1
650 TABLET ORAL
Status: DISCONTINUED | OUTPATIENT
Start: 2023-01-04 | End: 2023-01-04 | Stop reason: HOSPADM

## 2023-01-04 RX ORDER — POLYETHYLENE GLYCOL 3350 17 G/17G
17 POWDER, FOR SOLUTION ORAL DAILY PRN
Status: DISCONTINUED | OUTPATIENT
Start: 2023-01-04 | End: 2023-01-04 | Stop reason: HOSPADM

## 2023-01-04 RX ORDER — MIDODRINE HYDROCHLORIDE 5 MG/1
5 TABLET ORAL
Status: DISCONTINUED | OUTPATIENT
Start: 2023-01-04 | End: 2023-01-04 | Stop reason: HOSPADM

## 2023-01-04 RX ORDER — HYDRALAZINE HYDROCHLORIDE 25 MG/1
25 TABLET, FILM COATED ORAL DAILY
COMMUNITY
End: 2023-01-04

## 2023-01-04 RX ORDER — MIDODRINE HYDROCHLORIDE 5 MG/1
5 TABLET ORAL
Qty: 90 TABLET | Refills: 0 | Status: SHIPPED | OUTPATIENT
Start: 2023-01-04 | End: 2023-02-03

## 2023-01-04 RX ORDER — MIDODRINE HYDROCHLORIDE 5 MG/1
2.5 TABLET ORAL
Status: COMPLETED | OUTPATIENT
Start: 2023-01-04 | End: 2023-01-04

## 2023-01-04 RX ORDER — SODIUM CHLORIDE 0.9 % (FLUSH) 0.9 %
5-40 SYRINGE (ML) INJECTION EVERY 8 HOURS
Status: DISCONTINUED | OUTPATIENT
Start: 2023-01-04 | End: 2023-01-04 | Stop reason: HOSPADM

## 2023-01-04 RX ORDER — SODIUM CHLORIDE 0.9 % (FLUSH) 0.9 %
5-40 SYRINGE (ML) INJECTION AS NEEDED
Status: DISCONTINUED | OUTPATIENT
Start: 2023-01-04 | End: 2023-01-04 | Stop reason: HOSPADM

## 2023-01-04 RX ORDER — ONDANSETRON 4 MG/1
4 TABLET, ORALLY DISINTEGRATING ORAL
Status: DISCONTINUED | OUTPATIENT
Start: 2023-01-04 | End: 2023-01-04 | Stop reason: HOSPADM

## 2023-01-04 RX ORDER — ROSUVASTATIN CALCIUM 40 MG/1
40 TABLET, COATED ORAL
Status: DISCONTINUED | OUTPATIENT
Start: 2023-01-04 | End: 2023-01-04 | Stop reason: HOSPADM

## 2023-01-04 RX ADMIN — MIDODRINE HYDROCHLORIDE 2.5 MG: 5 TABLET ORAL at 04:49

## 2023-01-04 RX ADMIN — SODIUM CHLORIDE 1000 ML: 9 INJECTION, SOLUTION INTRAVENOUS at 04:50

## 2023-01-04 RX ADMIN — MIDODRINE HYDROCHLORIDE 5 MG: 5 TABLET ORAL at 09:11

## 2023-01-04 RX ADMIN — PANTOPRAZOLE SODIUM 40 MG: 40 TABLET, DELAYED RELEASE ORAL at 09:11

## 2023-01-04 RX ADMIN — RIVAROXABAN 15 MG: 15 TABLET, FILM COATED ORAL at 09:11

## 2023-01-04 RX ADMIN — ACETAMINOPHEN 650 MG: 325 TABLET ORAL at 09:11

## 2023-01-04 RX ADMIN — MIDODRINE HYDROCHLORIDE 5 MG: 5 TABLET ORAL at 13:21

## 2023-01-04 NOTE — PROGRESS NOTES
OCCUPATIONAL THERAPY EVALUATION/DISCHARGE  Patient: Anirudh Rodriguez (61 y.o. female)  Date: 1/4/2023  Primary Diagnosis: Orthostatic hypotension [I95.1]       Precautions:   Fall, Contact (VRE, ESBL)    ASSESSMENT  Based on the objective data described below, the patient presents with baseline mobility and ADLs. Pt continues to have orthostasis but is functional. Pt has a long standing history of POTS but reports that in the past she needed BP medication for high and low BP. Recently she has only been taking midodrine for low BP as HTN hasn't been a issue. Educated pt on the benefits of having a rollator walker to sit on if needed and for transporting objects. Educated on walker safety and fall prevention with good understanding. She is performing ADLs at a independent to modified independent level. Pt is in agreement that further OT services aren't needed at this time. Vitals:    01/04/23 0938 01/04/23 1117 01/04/23 1118 01/04/23 1124   BP:  132/75 (!) 85/67 (!) 95/49   BP 1 Location:  Left upper arm Left upper arm Left upper arm   BP Patient Position:  Supine Sitting Sitting  Comment: after ambulation down castro and back   Pulse:       Temp:       Resp:       Height:       Weight:       SpO2: 94%          Current Level of Function (ADLs/self-care): independent to modified independent mobility and ADLS    Functional Outcome Measure: The patient scored 100/100 on the barthel outcome measure which is indicative of no deficits with mobility and ADLS. Other factors to consider for discharge: none     PLAN :  Recommend with staff: mobilize    Recommendation for discharge: (in order for the patient to meet his/her long term goals)  No skilled occupational therapy/ follow up rehabilitation needs identified at this time.     This discharge recommendation:  Has been made in collaboration with the attending provider and/or case management    IF patient discharges home will need the following DME: rollator walker         SUBJECTIVE:   Patient stated I am getting sore from laying still.     OBJECTIVE DATA SUMMARY:   HISTORY:   Past Medical History:   Diagnosis Date    Aneurysm ([facility]     Anxiety 01/28/2010    Brain aneurysm     2.5 mm    CAD (coronary artery disease)     carotid stenosis    Chronic kidney disease     CVA (cerebral vascular accident) ([facility] 03/2021    right hemispheric    DM (diabetes mellitus) ([facility] 01/28/2010    GERD (gastroesophageal reflux disease)     HTN (hypertension) 01/28/2010    Hyperlipidemia 01/28/2010    IBS (irritable bowel syndrome)     POTS (postural orthostatic tachycardia syndrome)     Psychiatric disorder     PUD (peptic ulcer disease)     Thromboembolus ([facility]      Past Surgical History:   Procedure Laterality Date    HX AMPUTATION TOE Right 2019    HX AMPUTATION TOE Left 06/2022    partial 2nd toe    HX COLONOSCOPY      IR CHOLECYSTOSTOMY PERCUTANEOUS  2016       Prior Level of Function/Environment/Context: sits on tub transfer bench to bathe, performed ADLs on her own,ambulated without assist devices but recently using a RW, cooks, stands to wash dishes, performs light housekeeping, all other IADLs are performed by her son  Expanded or extensive additional review of patient history:   Home Situation  Home Environment: Private residence  # Steps to Enter: 4  Rails to Enter: Yes  Hand Rails : Bilateral  One/Two Story Residence: One story (with 1 step)  Living Alone: No (lives with son, he works during the day)  Support Systems: Child(dion)  Patient Expects to be Discharged to[de-identified] Home  Current DME Used/Available at Home: Lauri Shore, rolling, Grab bars, Tub transfer bench  Tub or Shower Type: Tub/Shower combination    Hand dominance: Right    EXAMINATION OF PERFORMANCE DEFICITS:  Cognitive/Behavioral Status:  Neurologic State: Alert  Orientation Level: Oriented X4  Cognition: Appropriate decision making; Appropriate for age attention/concentration; Appropriate safety awareness  Perception: Appears intact  Perseveration: No perseveration noted  Safety/Judgement: Awareness of environment; Fall prevention;Home safety; Insight into deficits    Hearing: Auditory  Auditory Impairment: None    Vision/Perceptual:                                Corrective Lenses: Reading glasses    Range of Motion:    AROM: Within functional limits  PROM: Within functional limits                      Strength:    Strength: Within functional limits                Coordination:  Coordination: Within functional limits  Fine Motor Skills-Upper: Left Intact; Right Intact    Gross Motor Skills-Upper: Left Intact; Right Intact    Tone & Sensation:    Tone: Normal  Sensation: Impaired (numbness in hand at times, numbness from bilateral feet to thighs)                      Balance:  Sitting: Intact  Standing: Intact    Functional Mobility and Transfers for ADLs:  Bed Mobility:  Rolling: Independent  Supine to Sit: Independent  Sit to Supine: Independent    Transfers:  Sit to Stand: Independent  Stand to Sit: Independent  Bed to Chair: Modified independent (RW)  Bathroom Mobility: Modified independent (RW)  Toilet Transfer : Modified independent (RW)    ADL Assessment:  Feeding: Independent    Oral Facial Hygiene/Grooming: Independent    Bathing: Modified independent         Upper Body Dressing: Independent    Lower Body Dressing: Independent    Toileting: Independent                ADL Intervention and task modifications:     See assessment    Cognitive Retraining  Safety/Judgement: Awareness of environment; Fall prevention;Home safety; Insight into deficits      Functional Measure:    Barthel Index:  Bathin  Bladder: 10  Bowels: 10  Groomin  Dressing: 10  Feeding: 10  Mobility: 15  Stairs: 10  Toilet Use: 10  Transfer (Bed to Chair and Back): 15  Total: 100/100      The Barthel ADL Index: Guidelines  1. The index should be used as a record of what a patient does, not as a record of what a patient could do.   2. The main aim is to establish degree of independence from any help, physical or verbal, however minor and for whatever reason. 3. The need for supervision renders the patient not independent. 4. A patient's performance should be established using the best available evidence. Asking the patient, friends/relatives and nurses are the usual sources, but direct observation and common sense are also important. However direct testing is not needed. 5. Usually the patient's performance over the preceding 24-48 hours is important, but occasionally longer periods will be relevant. 6. Middle categories imply that the patient supplies over 50 per cent of the effort. 7. Use of aids to be independent is allowed. Score Interpretation (from [facility])    Independent   60-79 Minimally independent   40-59 Partially dependent   20-39 Very dependent   <20 Totally dependent     -Julee Bae., Barthel, DLaviniaW. (1965). Functional evaluation: the Barthel Index. [address] [facility] ([address] [place] (1997). The Barthel activities of daily living index: self-reporting versus actual performance in the old (> or = 75 years). Journal [address] 45(7), 14 [facility], J.JROBERTF, Mario Yanez., Mann Mcnulty (1999). Measuring the change in disability after inpatient rehabilitation; comparison of the responsiveness of the Barthel Index and Functional [place] Measure. Journal of Neurology, Neurosurgery, and Psychiatry, 66(4), 826-736. Afsaneh Can, N.J.A, ROMAN Brown, & Min Collado, M.A. (2004) Assessment of post-stroke quality of life in cost-effectiveness studies: The usefulness of the Barthel Index and the EuroQoL-5D.  Quality of Life Research, 15, 373-10         Occupational Therapy Evaluation Charge Determination   History Examination Decision-Making   LOW Complexity : Brief history review  LOW Complexity : 1-3 performance deficits relating to physical, cognitive , or psychosocial skils that result in activity limitations and / or participation restrictions  LOW Complexity : No comorbidities that affect functional and no verbal or physical assistance needed to complete eval tasks       Based on the above components, the patient evaluation is determined to be of the following complexity level: LOW     Activity Tolerance:   Good    After treatment patient left in no apparent distress:    Supine in bed, Call bell within reach, and bilateral rails up on stretcher as prior to session    COMMUNICATION/EDUCATION:   The patients plan of care was discussed with: Physical therapist, Registered nurse, and patient .      Thank you for this referral.  John Ruiz OTR/L  Time Calculation: 18 mins

## 2023-01-04 NOTE — Clinical Note
Status[de-identified] INPATIENT [101]   Type of Bed: Telemetry [19]   Cardiac Monitoring Required?: Yes   Inpatient Hospitalization Certified Necessary for the Following Reasons: 9. Other (further clarification in H&P documentation)   Admitting Diagnosis: Orthostatic hypotension [458. 0. ICD-9-CM]   Admitting Physician: Dayton Smith [[id number]]   Attending Physician: Dayton Smith [[id number]]   Estimated Length of Stay: 2 Midnights   Discharge Plan[de-identified] [facility]

## 2023-01-04 NOTE — H&P
This note is compiled to communicate with the medical care team. It may contain sensitive and protected information. It is not intended to serve as a source of communication with patients/families/friends; that communication occurs at the bedside or via alternative direct communications means (secure messaging, letters, video/telephone, etc.). Hospitalist Admission Note    NAME: Ariadne Iniguez   :  1960   MRN:  [id number]     Date/Time:  2023 6:16 AM    Patient PCP: Jackelin Castro, NP  ______________________________________________________________________  Given the patient's current clinical presentation, I have a high level of concern for decompensation if discharged from the emergency department. Complex decision making was performed, which includes reviewing the patient's available past medical records, laboratory results, and x-ray films. My assessment of this patient's clinical condition and my plan of care is as follows. Subjective:   CHIEF COMPLAINT: Orthostatic hypotension with syncope    HISTORY OF PRESENT ILLNESS:     Jess Grant is a 60 y.o.  female with pertinent past medical history POTS, CAD, PAD who presents after syncopal episode at home with prolonged unresponsive episode prior to presentation. Patient reports similar episodes in the past due to orthostatic hypotension. She reports she has had extensive work-up with her cardiologist, Dr. Junior Hebert, including echo, ECG, tilt table without abnormality found. Currently taking twice daily metoprolol and 3 times daily as needed midodrine. Does appear she was evaluated in the past with recommendations for consideration of increased salt intake or Florinef which would need to be coordinated with nephrology. Also complicating picture is patient's severe PAD with multiple recent toe amputations and femoropopliteal bypass with Dr. Pina Valdovinos in vascular surgery.   At present, patient reports no complaints but becomes markedly symptomatic/presyncopal with positional change. In the ED, patient afebrile and hemodynamically stable when supine however on standing blood pressure drops 50s/40s confirmed on multiple trials and with cuff repositioning. Noted during these episodes heart rate does not rise above 75. Sats upper 90s on room air. ECG demonstrates NSR without definitive ischemic change. CXR demonstrates mild bibasilar atelectasis. CT C-spine demonstrates no acute fracture or subluxation. CT head demonstrates no acute intracranial process. Labs demonstrate: WBC 7.4, hemoglobin 12.0, platelets 162, sodium 130, potassium 3.9, BUN 24, creatinine 1.81 (similar to prior), UA not reflexed to culture. Patient given 1 L fluid bolus and 2.5 mg midodrine without significant effect on orthostatics. We were asked to admit for work up and evaluation of the above problems. Past Medical History:   Diagnosis Date    Aneurysm ([facility] [address]     Anxiety 2010    Brain aneurysm     2.5 mm    CAD (coronary artery disease)     carotid stenosis    Chronic kidney disease     CVA (cerebral vascular accident) ([facility] [address] 2021    right hemispheric    DM (diabetes mellitus) ([facility] [address] 2010    GERD (gastroesophageal reflux disease)     HTN (hypertension) 2010    Hyperlipidemia 2010    IBS (irritable bowel syndrome)     POTS (postural orthostatic tachycardia syndrome)     Psychiatric disorder     PUD (peptic ulcer disease)     Thromboembolus ([facility] [address]         Past Surgical History:   Procedure Laterality Date    HX AMPUTATION TOE Right     HX AMPUTATION TOE Left 2022    partial 2nd toe    HX COLONOSCOPY      IR CHOLECYSTOSTOMY PERCUTANEOUS         Social History     Tobacco Use    Smoking status: Former     Types: Cigarettes     Quit date: 1982     Years since quittin.0    Smokeless tobacco: Never   Substance Use Topics    Alcohol use:  Yes     Alcohol/week: 21.0 standard drinks     Types: 21 Shots of liquor per week Comment: eugenia        Family History   Problem Relation Age of Onset    Heart Disease Mother     Cancer Father     Liver Disease Sister     Liver Disease Brother        Allergies   Allergen Reactions    Gabapentin Anaphylaxis     Swelling and rash, hives      Ace Inhibitors Other (comments)     angioedema    Shell-[place] Plus Other (comments)     Increased blood pressure 3/21/19- pt reports no allergy      Anexsia [Hydrocodone-Acetaminophen] Unknown (comments)     Pt reports no allergy      Pepto-Bismol [Bismuth Subsalicylate] Other (comments)     Tongue color change          Prior to Admission medications    Medication Sig Start Date End Date Taking? Authorizing Provider   midodrine (PROAMATINE) 2.5 mg tablet 1 tablet 3 TIMES DAILY (route: oral) 9/21/22  Yes Other, MD Rozina   folic acid (FOLVITE) 1 mg tablet Take 1 mg by mouth daily. Yes Provider, Historical   QUEtiapine (SEROquel) 50 mg tablet Take 50 mg by mouth daily. Yes Provider, Historical   QUEtiapine (SEROquel) 50 mg tablet Take 150 mg by mouth nightly. Yes Provider, Historical   ascorbic acid, vitamin C, (VITAMIN C) 250 mg tablet Take 1 Tablet by mouth daily. 9/1/22  Yes Jacquelyn Wong NP   ferrous sulfate 325 mg (65 mg iron) tablet Take 1 Tablet by mouth Daily (before breakfast). 9/1/22  Yes Jacquelyn Wong NP   busPIRone (BUSPAR) 10 mg tablet Take 1 Tablet by mouth three (3) times daily. 9/1/22  Yes Jacquelyn Wong NP   DULoxetine (CYMBALTA) 30 mg capsule Take 1 Capsule by mouth daily. 9/1/22  Yes Jacquelyn Wong NP   metoprolol tartrate (LOPRESSOR) 25 mg tablet Take 0.5 Tablets by mouth two (2) times a day. HOLD for systolic BP < 90 or HR < 60. 9/1/22  Yes Jacquelyn Wong NP   rivaroxaban (XARELTO) 15 mg tab tablet Take 1 Tablet by mouth daily.  This replaces previously prescribed dose of 20mg oral daily  Indications: prevention of thromboembolism in paroxysmal atrial fibrillation 9/2/22  Yes Ame Gerard NP   vitamin B complex (B COMPLEX PO) Take 1 Tablet by mouth daily. Yes Provider, Historical   magnesium oxide (MAG-OX) 400 mg tablet Take 400 mg by mouth daily. Yes Provider, Historical   pantoprazole (PROTONIX) 40 mg tablet Take 40 mg by mouth two (2) times a day. Yes Provider, Historical   rosuvastatin (CRESTOR) 40 mg tablet Take 40 mg by mouth nightly. Yes Provider, Historical   hydrALAZINE (APRESOLINE) 25 mg tablet Take 25 mg by mouth daily. Other, MD Rozina       REVIEW OF SYSTEMS:       Total of 12 systems reviewed with pertinent positives and negatives noted in HPI      Objective:   VITALS:    Visit Vitals  BP (!) 57/47 (BP 1 Location: Left upper arm, BP Patient Position: Standing)   Pulse 74   Temp 97.3 °F (36.3 °C)   Resp 16   Ht 5' 7\" (1.702 m)   Wt 88.9 kg (196 lb)   SpO2 100%   BMI 30.70 kg/m²       PHYSICAL EXAM:    General:   Alert, cooperative, no distress, well-appearing middle-age  female        HEENT:  Atraumatic, anicteric sclerae, pink conjunctivae, mucosa moist, dentition fair     Neck:  Supple, symmetrical, trachea midline, no abnormalities on palpation     Lungs:   CTAB. Symmetric expansion. Good aeration. Normal respiratory effort. Chest wall:  No tenderness. No Accessory muscle use. Cardiovascular:   RRR, No murmur/rub/gallop. No JVD. Radial/AT/DP pulse 2+     GI/:   Obese/protuberant, soft, non-tender. Not distended. Bowel sounds normal. No HSM     Extremities No edema. No cyanosis. Capillary refill normal     Skin: No significant lesions noted. Not Jaundiced. No rashes      Neurologic: PERRL. EOMI. No facial asymmetry. No aphasia or slurred speech. Moves all extremities. Sensation to light touch grossly intact BUE/BLE. No overt focal deficits appreciated     Psych:  Alert and oriented X 4. Normal affect.  Good insight     Other:   N/A         LAB DATA REVIEWED:    Recent Results (from the past 24 hour(s))   EKG, 12 LEAD, INITIAL    Collection Time: 01/03/23 10:50 PM Result Value Ref Range    Ventricular Rate 65 BPM    Atrial Rate 65 BPM    P-R Interval 160 ms    QRS Duration 102 ms    Q-T Interval 488 ms    QTC Calculation (Bezet) 507 ms    Calculated P Axis -8 degrees    Calculated R Axis -9 degrees    Calculated T Axis 1 degrees    Diagnosis       Normal sinus rhythm  Possible Anterior infarct , age undetermined  When compared with ECG of 25-AUG-2022 12:48,  fusion complexes are no longer present     CBC WITH AUTOMATED DIFF    Collection Time: 01/03/23 11:17 PM   Result Value Ref Range    WBC 7.4 3.6 - 11.0 K/uL    RBC 3.55 (L) 3.80 - 5.20 M/uL    HGB 12.0 11.5 - 16.0 g/dL    HCT 37.1 35.0 - 47.0 %    .5 (H) 80.0 - 99.0 FL    MCH 33.8 26.0 - 34.0 PG    MCHC 32.3 30.0 - 36.5 g/dL    RDW 14.4 11.5 - 14.5 %    PLATELET 752 (L) 550 - 400 K/uL    MPV 10.6 8.9 - 12.9 FL    NRBC 0.0 0  WBC    ABSOLUTE NRBC 0.00 0.00 - 0.01 K/uL    NEUTROPHILS 47 32 - 75 %    LYMPHOCYTES 46 12 - 49 %    MONOCYTES 6 5 - 13 %    EOSINOPHILS 1 0 - 7 %    BASOPHILS 0 0 - 1 %    IMMATURE GRANULOCYTES 0 0.0 - 0.5 %    ABS. NEUTROPHILS 3.5 1.8 - 8.0 K/UL    ABS. LYMPHOCYTES 3.4 0.8 - 3.5 K/UL    ABS. MONOCYTES 0.4 0.0 - 1.0 K/UL    ABS. EOSINOPHILS 0.1 0.0 - 0.4 K/UL    ABS. BASOPHILS 0.0 0.0 - 0.1 K/UL    ABS. IMM. GRANS. 0.0 0.00 - 0.04 K/UL    DF AUTOMATED     METABOLIC PANEL, COMPREHENSIVE    Collection Time: 01/03/23 11:17 PM   Result Value Ref Range    Sodium 130 (L) 136 - 145 mmol/L    Potassium 3.9 3.5 - 5.1 mmol/L    Chloride 93 (L) 97 - 108 mmol/L    CO2 29 21 - 32 mmol/L    Anion gap 8 5 - 15 mmol/L    Glucose 134 (H) 65 - 100 mg/dL    BUN 24 (H) 6 - 20 MG/DL    Creatinine 1.81 (H) 0.55 - 1.02 MG/DL    BUN/Creatinine ratio 13 12 - 20      eGFR 31 (L) >60 ml/min/1.73m2    Calcium 8.2 (L) 8.5 - 10.1 MG/DL    Bilirubin, total 0.6 0.2 - 1.0 MG/DL    ALT (SGPT) 43 12 - 78 U/L    AST (SGOT) 43 (H) 15 - 37 U/L    Alk.  phosphatase 115 45 - 117 U/L    Protein, total 6.1 (L) 6.4 - 8.2 g/dL Albumin 2.2 (L) 3.5 - 5.0 g/dL    Globulin 3.9 2.0 - 4.0 g/dL    A-G Ratio 0.6 (L) 1.1 - 2.2     URINALYSIS W/ REFLEX CULTURE    Collection Time: 01/04/23  3:26 AM    Specimen: Urine   Result Value Ref Range    Color YELLOW/STRAW      Appearance CLOUDY (A) CLEAR      Specific gravity 1.013      pH (UA) 5.5 5.0 - 8.0      Protein Negative NEG mg/dL    Glucose Negative NEG mg/dL    Ketone Negative NEG mg/dL    Bilirubin Negative NEG      Blood TRACE (A) NEG      Urobilinogen 0.2 0.2 - 1.0 EU/dL    Nitrites Negative NEG      Leukocyte Esterase SMALL (A) NEG      WBC 5-10 0 - 4 /hpf    RBC 0-5 0 - 5 /hpf    Epithelial cells MANY (A) FEW /lpf    Bacteria 2+ (A) NEG /hpf    UA:UC IF INDICATED CULTURE NOT INDICATED BY UA RESULT CNI         IMAGING:  CXR-mild bibasilar atelectasis    CT C-spine-no acute fracture or subluxation    CT head-no acute intracranial process. No significant change from the prior study.     EKG: Normal sinus rhythm without definitive ischemic change, rate 65, , QTc 507  ______________________________________________________________________    Assessment / Plan:  Orthostatic hypotension  History of pots  CAD  PAD  HLD  GERD    PLAN:    Orthostatic hypotension  History of pots  CAD  PAD  HLD  Admit to telemetry monitoring  Cardiology consulted, greatly appreciate their expertise  Schedule midodrine and increased dose of 5 mg 3 times daily  Status post 1 L fluid bolus  Hold PTA antihypertensives: Metoprolol, hydralazine  Continue PTA rosuvastatin, Xarelto  Would need vascular surgery/cardiology input as to whether or not abdominal binders and BRISSA hose would be appropriate given degree of PAD  Appears patient has had very extensive work-up thus far without clear therapy  PT/OT consulted for DME evaluation    GERD  Continue PTA Protonix      Code Status: Full  Emergency contact:   Son    DVT Prophylaxis: Xarelto  GI Prophylaxis: Protonix  Diet: Cardiac       Care Plan discussed with:    Comments Patient x    Family  x Son at bedside   RN     Care Manager                    Consultant:      _______________________________________________________________________  Baseline Level of Function: Independent    Expected  Disposition:   Home with Family x   HH/PT/OT/RN    SNF/LTC    LAVERNE    ________________________________________________________________________  TOTAL TIME:  60 Minutes   MEDICAL COMPLEXITY: moderate  TOBACCO CESSATION COUNSELING: NO        Comments    x Reviewed previous records   >50% of visit spent in counseling and coordination of care x Discussion with patient and/or family and questions answered       ________________________________________________________________________  Signed: David Joseph DO  1/4/2023 6:16 AM    Please note that this documentation was completed in part with Dragon dictation software. Occasionally unanticipated grammatical, syntax, homophones, and other interpretive errors are inadvertently transcribed by the computer software. Please excuse and disregard any errors that have escaped final proofreading. If in doubt, please do not hesitate to reach out to myself or the assigned hospitalist via [facility] paging system for clarification.

## 2023-01-04 NOTE — DISCHARGE SUMMARY
Hospitalist Discharge Summary     Patient ID:  Tammy Stubbs  [id number]  60 y.o.  1960 1/4/2023    PCP on record: Alecia Torres NP    Admit date: 1/4/2023  Discharge date and time: 1/4/2023    DISCHARGE DIAGNOSIS:    Orthostatic hypotension    CONSULTATIONS:  None    Excerpted HPI from H&P of Carley Escoto MD:  Daysi Mendoza is a 60 y.o.  female with pertinent past medical history POTS, CAD, PAD who presents after syncopal episode at home with prolonged unresponsive episode prior to presentation. Patient reports similar episodes in the past due to orthostatic hypotension. She reports she has had extensive work-up with her cardiologist, Dr. Christie Isaacs, including echo, ECG, tilt table without abnormality found. Currently taking twice daily metoprolol and 3 times daily as needed midodrine. Does appear she was evaluated in the past with recommendations for consideration of increased salt intake or Florinef which would need to be coordinated with nephrology. Also complicating picture is patient's severe PAD with multiple recent toe amputations and femoropopliteal bypass with Dr. Aly Guzman in vascular surgery. At present, patient reports no complaints but becomes markedly symptomatic/presyncopal with positional change. In the ED, patient afebrile and hemodynamically stable when supine however on standing blood pressure drops 50s/40s confirmed on multiple trials and with cuff repositioning. Noted during these episodes heart rate does not rise above 75. Sats upper 90s on room air. ECG demonstrates NSR without definitive ischemic change. CXR demonstrates mild bibasilar atelectasis. CT C-spine demonstrates no acute fracture or subluxation. CT head demonstrates no acute intracranial process. Labs demonstrate: WBC 7.4, hemoglobin 12.0, platelets 015, sodium 130, potassium 3.9, BUN 24, creatinine 1.81 (similar to prior), UA not reflexed to culture.   Patient given 1 L fluid bolus and 2.5 mg midodrine without significant effect on orthostatics. ______________________________________________________________________  DISCHARGE SUMMARY/HOSPITAL COURSE:  for full details see H&P, daily progress notes, labs, consult notes. Orthostatic hypotension  History of pots  CAD  PAD  HLD  When asked why she presented to the ED she reported that she falls a lot but since she was started on a blood thinner she was told to obtain a CT head after falls to rule out bleed. Pt reports a long standing history of orthostatic hypotension that is normally titrated outpatient by PCP, cardiology. Schedule midodrine and increased dose of 5 mg 3 times daily  Status post 1 L fluid bolus  Hold PTA antihypertensives: hydralazine  Continue home metoprolol  Continue PTA rosuvastatin, Xarelto  Would need outpatient vascular surgery/cardiology input as to whether or not abdominal binders and BRISSA hose would be appropriate given degree of PAD  Appears patient has had very extensive work-up thus far without clear therapy  PT/OT consulted for DME evaluation-pt has walker at home.   _______________________________________________________________________  Patient seen and examined by me on discharge day. Pertinent Findings:  Gen:    Not in distress  Chest: Clear lungs  CVS:   Regular rhythm. No edema  Abd:  Soft, not distended, not tender  Neuro:  Alert  _______________________________________________________________________  DISCHARGE MEDICATIONS:   Current Discharge Medication List        CONTINUE these medications which have CHANGED    Details   midodrine (PROAMATINE) 5 mg tablet Take 1 Tablet by mouth three (3) times daily (with meals) for 30 days. Qty: 90 Tablet, Refills: 0  Start date: 1/4/2023, End date: 2/3/2023           CONTINUE these medications which have NOT CHANGED    Details   folic acid (FOLVITE) 1 mg tablet Take 1 mg by mouth daily. !! QUEtiapine (SEROquel) 50 mg tablet Take 50 mg by mouth daily.       !! QUEtiapine (SEROquel) 50 mg tablet Take 150 mg by mouth nightly. ascorbic acid, vitamin C, (VITAMIN C) 250 mg tablet Take 1 Tablet by mouth daily. Qty: 30 Tablet, Refills: 11      ferrous sulfate 325 mg (65 mg iron) tablet Take 1 Tablet by mouth Daily (before breakfast). Qty: 30 Tablet, Refills: 11      busPIRone (BUSPAR) 10 mg tablet Take 1 Tablet by mouth three (3) times daily. Qty: 90 Tablet, Refills: 11      DULoxetine (CYMBALTA) 30 mg capsule Take 1 Capsule by mouth daily. Qty: 30 Capsule, Refills: 11      metoprolol tartrate (LOPRESSOR) 25 mg tablet Take 0.5 Tablets by mouth two (2) times a day. HOLD for systolic BP < 90 or HR < 60.  Qty: 30 Tablet, Refills: 11      rivaroxaban (XARELTO) 15 mg tab tablet Take 1 Tablet by mouth daily. This replaces previously prescribed dose of 20mg oral daily  Indications: prevention of thromboembolism in paroxysmal atrial fibrillation  Qty: 30 Tablet, Refills: 5      vitamin B complex (B COMPLEX PO) Take 1 Tablet by mouth daily. magnesium oxide (MAG-OX) 400 mg tablet Take 400 mg by mouth daily. pantoprazole (PROTONIX) 40 mg tablet Take 40 mg by mouth two (2) times a day. rosuvastatin (CRESTOR) 40 mg tablet Take 40 mg by mouth nightly. !! - Potential duplicate medications found. Please discuss with provider. STOP taking these medications       hydrALAZINE (APRESOLINE) 25 mg tablet Comments:   Reason for Stopping:                 Patient Follow Up Instructions: Activity: Activity as tolerated  Diet: Regular Diet  Wound Care: Keep wound clean and dry    Follow-up with PCP in 1 week.   Follow-up tests/labs   Follow-up Information       Follow up With Specialties Details Why Contact Info    Jarred García NP Nurse Practitioner   [address] [facility] [address] [facility]  [phone]            ________________________________________________________________    Risk of deterioration: Low    Condition at Discharge: Stable  __________________________________________________________________    Disposition  Home with family, no needs    ____________________________________________________________________    Code Status: Full Code  ___________________________________________________________________      Total time in minutes spent coordinating this discharge (includes going over instructions, follow-up, prescriptions, and preparing report for sign off to her PCP) :  30 minutes    Signed:  Carlos Merino MD

## 2023-01-04 NOTE — ED PROVIDER NOTES
EMERGENCY DEPARTMENT HISTORY AND PHYSICAL EXAM     ----------------------------------------------------------------------------  Please note that this dictation was completed with [employer], the [employer] voice recognition software. Quite often unanticipated grammatical, syntax, homophones, and other interpretive errors are inadvertently transcribed by the computer software. Please disregard these errors. Please excuse any errors that have escaped final proofreading  ----------------------------------------------------------------------------      Date: 1/4/2023  Patient Name: Brendon Smith    History of Presenting Illness     Chief Complaint   Patient presents with    Fall     Patient arrives via EMS from home after several GLFs. Patient is on xarelto and hit her head with confirmed loss of consciousness. Per [employer], patient was unconscious for about 10 minutes. Patient has history of POTS. Patient complains of diffuse pain all over. Patient also just finished doxycycline for PNA       History obtainted from:  Patient    Other independent source of history: Son    HPI: Brendon Smith is a 60 y.o. female, with significant pmhx of POTS, who presents via EMS to the ED with report of prolonged syncopal episode at home as witnessed by family. Patient was reportedly confused with slurred speech after regaining consciousness. Patient with history of POTS and recurrent syncope but noted that she did not change position just prior to the onset of the symptoms. Family was attempting to get the patient out of the house when EMS arrived. Does not recall feeling dizzy or having associated chest pain or shortness of breath. Notes having generalized body aches and left shoulder pain from fall that occurred several days ago. Notes that she fell that day due to the fact that she tripped over her dog. Has intact sensation and motor function distally. No focal deficit appreciated or facial asymmetry.   Patient able to speak in clear, coherent sentences and follow directions appropriately. Denies recent changes in medication although is compliant with her Xarelto. Denies recent nausea, vomiting or diarrhea. PCP: Jarred García NP    Allergies   Allergen Reactions    Gabapentin Anaphylaxis     Swelling and rash, hives      Ace Inhibitors Other (comments)     angioedema    Shell-[place] Plus Other (comments)     Increased blood pressure 3/21/19- pt reports no allergy      Anexsia [Hydrocodone-Acetaminophen] Unknown (comments)     Pt reports no allergy      Pepto-Bismol [Bismuth Subsalicylate] Other (comments)     Tongue color change         Current Facility-Administered Medications   Medication Dose Route Frequency Provider Last Rate Last Admin    midodrine (PROAMATINE) tablet 5 mg  5 mg Oral TID WITH MEALS Flory Left, DO        pantoprazole (PROTONIX) tablet 40 mg  40 mg Oral BID Flory Left, DO        rivaroxaban Sushil Santos) tablet 15 mg  15 mg Oral DAILY Flory Left, DO        rosuvastatin (CRESTOR) tablet 40 mg  40 mg Oral QHS Flory Left, DO        sodium chloride (NS) flush 5-40 mL  5-40 mL IntraVENous Q8H Flory Left, DO        sodium chloride (NS) flush 5-40 mL  5-40 mL IntraVENous PRN Flory Left, DO        polyethylene glycol (MIRALAX) packet 17 g  17 g Oral DAILY PRN Flory Left, DO        ondansetron (ZOFRAN ODT) tablet 4 mg  4 mg Oral Q8H PRN Flory Left, DO        Or    ondansetron [facility]) injection 4 mg  4 mg IntraVENous Q6H PRN Flory Left, DO         Current Outpatient Medications   Medication Sig Dispense Refill    midodrine (PROAMATINE) 2.5 mg tablet 1 tablet 3 TIMES DAILY (route: oral)      folic acid (FOLVITE) 1 mg tablet Take 1 mg by mouth daily. QUEtiapine (SEROquel) 50 mg tablet Take 50 mg by mouth daily. QUEtiapine (SEROquel) 50 mg tablet Take 150 mg by mouth nightly.       ascorbic acid, vitamin C, (VITAMIN C) 250 mg tablet Take 1 Tablet by mouth daily. 30 Tablet 11    ferrous sulfate 325 mg (65 mg iron) tablet Take 1 Tablet by mouth Daily (before breakfast). 30 Tablet 11    busPIRone (BUSPAR) 10 mg tablet Take 1 Tablet by mouth three (3) times daily. 90 Tablet 11    DULoxetine (CYMBALTA) 30 mg capsule Take 1 Capsule by mouth daily. 30 Capsule 11    metoprolol tartrate (LOPRESSOR) 25 mg tablet Take 0.5 Tablets by mouth two (2) times a day. HOLD for systolic BP < 90 or HR < 60. 30 Tablet 11    rivaroxaban (XARELTO) 15 mg tab tablet Take 1 Tablet by mouth daily. This replaces previously prescribed dose of 20mg oral daily  Indications: prevention of thromboembolism in paroxysmal atrial fibrillation 30 Tablet 5    vitamin B complex (B COMPLEX PO) Take 1 Tablet by mouth daily. magnesium oxide (MAG-OX) 400 mg tablet Take 400 mg by mouth daily. pantoprazole (PROTONIX) 40 mg tablet Take 40 mg by mouth two (2) times a day. rosuvastatin (CRESTOR) 40 mg tablet Take 40 mg by mouth nightly. hydrALAZINE (APRESOLINE) 25 mg tablet Take 25 mg by mouth daily.          Past History     Past Medical History:  Past Medical History:   Diagnosis Date    Aneurysm ([facility] [address]     Anxiety 01/28/2010    Brain aneurysm     2.5 mm    CAD (coronary artery disease)     carotid stenosis    Chronic kidney disease     CVA (cerebral vascular accident) ([facility] [address] 03/2021    right hemispheric    DM (diabetes mellitus) ([facility] [address] 01/28/2010    GERD (gastroesophageal reflux disease)     HTN (hypertension) 01/28/2010    Hyperlipidemia 01/28/2010    IBS (irritable bowel syndrome)     POTS (postural orthostatic tachycardia syndrome)     Psychiatric disorder     PUD (peptic ulcer disease)     Thromboembolus ([facility] [address]        Past Surgical History:  Past Surgical History:   Procedure Laterality Date    HX AMPUTATION TOE Right 2019    HX AMPUTATION TOE Left 06/2022    partial 2nd toe    HX COLONOSCOPY      IR CHOLECYSTOSTOMY PERCUTANEOUS  2016 Family History:  Family History   Problem Relation Age of Onset    Heart Disease Mother     Cancer Father     Liver Disease Sister     Liver Disease Brother        Social History:  Social History     Tobacco Use    Smoking status: Former     Types: Cigarettes     Quit date:      Years since quittin.0    Smokeless tobacco: Never   Substance Use Topics    Alcohol use: Yes     Alcohol/week: 21.0 standard drinks     Types: 21 Shots of liquor per week     Comment: bourbon    Drug use: No       Allergies: Allergies   Allergen Reactions    Gabapentin Anaphylaxis     Swelling and rash, hives      Ace Inhibitors Other (comments)     angioedema    Shell-[place] Plus Other (comments)     Increased blood pressure 3/21/19- pt reports no allergy      Anexsia [Hydrocodone-Acetaminophen] Unknown (comments)     Pt reports no allergy      Pepto-Bismol [Bismuth Subsalicylate] Other (comments)     Tongue color change           Review of Systems   Review of Systems   Constitutional: Negative. Negative for fever. Eyes: Negative. Respiratory: Negative. Negative for shortness of breath. Cardiovascular:  Negative for chest pain. Gastrointestinal:  Negative for abdominal pain, nausea and vomiting. Endocrine: Negative. Genitourinary: Negative. Negative for difficulty urinating, dysuria and hematuria. Musculoskeletal: Negative. Skin: Negative. Neurological:  Positive for dizziness, syncope, speech difficulty, weakness and light-headedness. Psychiatric/Behavioral:  Negative for suicidal ideas. Physical Exam   Physical Exam  Vitals and nursing note reviewed. Constitutional:       General: She is not in acute distress. Appearance: She is well-developed. She is obese. She is not diaphoretic. HENT:      Head: Normocephalic and atraumatic. Nose: Nose normal.   Eyes:      General: No scleral icterus. Conjunctiva/sclera: Conjunctivae normal.   Neck:      Trachea: No tracheal deviation. Cardiovascular:      Rate and Rhythm: Normal rate and regular rhythm. Heart sounds: Normal heart sounds. Pulmonary:      Effort: Pulmonary effort is normal. No respiratory distress. Abdominal:      General: There is no distension. Musculoskeletal:         General: No tenderness. Normal range of motion. Cervical back: Normal range of motion. Skin:     General: Skin is warm and dry. Neurological:      General: No focal deficit present. Mental Status: She is alert and oriented to person, place, and time. Mental status is at baseline. Cranial Nerves: No cranial nerve deficit. Sensory: No sensory deficit. Motor: No weakness. Gait: Gait normal.   Psychiatric:         Speech: Speech normal.         Behavior: Behavior normal.         Thought Content:  Thought content normal.         Diagnostic Study Results     Labs:     Recent Results (from the past 12 hour(s))   EKG, 12 LEAD, INITIAL    Collection Time: 01/03/23 10:50 PM   Result Value Ref Range    Ventricular Rate 65 BPM    Atrial Rate 65 BPM    P-R Interval 160 ms    QRS Duration 102 ms    Q-T Interval 488 ms    QTC Calculation (Bezet) 507 ms    Calculated P Axis -8 degrees    Calculated R Axis -9 degrees    Calculated T Axis 1 degrees    Diagnosis       Normal sinus rhythm  Possible Anterior infarct , age undetermined  When compared with ECG of 25-AUG-2022 12:48,  fusion complexes are no longer present     CBC WITH AUTOMATED DIFF    Collection Time: 01/03/23 11:17 PM   Result Value Ref Range    WBC 7.4 3.6 - 11.0 K/uL    RBC 3.55 (L) 3.80 - 5.20 M/uL    HGB 12.0 11.5 - 16.0 g/dL    HCT 37.1 35.0 - 47.0 %    .5 (H) 80.0 - 99.0 FL    MCH 33.8 26.0 - 34.0 PG    MCHC 32.3 30.0 - 36.5 g/dL    RDW 14.4 11.5 - 14.5 %    PLATELET 560 (L) 667 - 400 K/uL    MPV 10.6 8.9 - 12.9 FL    NRBC 0.0 0  WBC    ABSOLUTE NRBC 0.00 0.00 - 0.01 K/uL    NEUTROPHILS 47 32 - 75 %    LYMPHOCYTES 46 12 - 49 %    MONOCYTES 6 5 - 13 % EOSINOPHILS 1 0 - 7 %    BASOPHILS 0 0 - 1 %    IMMATURE GRANULOCYTES 0 0.0 - 0.5 %    ABS. NEUTROPHILS 3.5 1.8 - 8.0 K/UL    ABS. LYMPHOCYTES 3.4 0.8 - 3.5 K/UL    ABS. MONOCYTES 0.4 0.0 - 1.0 K/UL    ABS. EOSINOPHILS 0.1 0.0 - 0.4 K/UL    ABS. BASOPHILS 0.0 0.0 - 0.1 K/UL    ABS. IMM. GRANS. 0.0 0.00 - 0.04 K/UL    DF AUTOMATED     METABOLIC PANEL, COMPREHENSIVE    Collection Time: 01/03/23 11:17 PM   Result Value Ref Range    Sodium 130 (L) 136 - 145 mmol/L    Potassium 3.9 3.5 - 5.1 mmol/L    Chloride 93 (L) 97 - 108 mmol/L    CO2 29 21 - 32 mmol/L    Anion gap 8 5 - 15 mmol/L    Glucose 134 (H) 65 - 100 mg/dL    BUN 24 (H) 6 - 20 MG/DL    Creatinine 1.81 (H) 0.55 - 1.02 MG/DL    BUN/Creatinine ratio 13 12 - 20      eGFR 31 (L) >60 ml/min/1.73m2    Calcium 8.2 (L) 8.5 - 10.1 MG/DL    Bilirubin, total 0.6 0.2 - 1.0 MG/DL    ALT (SGPT) 43 12 - 78 U/L    AST (SGOT) 43 (H) 15 - 37 U/L    Alk. phosphatase 115 45 - 117 U/L    Protein, total 6.1 (L) 6.4 - 8.2 g/dL    Albumin 2.2 (L) 3.5 - 5.0 g/dL    Globulin 3.9 2.0 - 4.0 g/dL    A-G Ratio 0.6 (L) 1.1 - 2.2     URINALYSIS W/ REFLEX CULTURE    Collection Time: 01/04/23  3:26 AM    Specimen: Urine   Result Value Ref Range    Color YELLOW/STRAW      Appearance CLOUDY (A) CLEAR      Specific gravity 1.013      pH (UA) 5.5 5.0 - 8.0      Protein Negative NEG mg/dL    Glucose Negative NEG mg/dL    Ketone Negative NEG mg/dL    Bilirubin Negative NEG      Blood TRACE (A) NEG      Urobilinogen 0.2 0.2 - 1.0 EU/dL    Nitrites Negative NEG      Leukocyte Esterase SMALL (A) NEG      WBC 5-10 0 - 4 /hpf    RBC 0-5 0 - 5 /hpf    Epithelial cells MANY (A) FEW /lpf    Bacteria 2+ (A) NEG /hpf    UA:UC IF INDICATED CULTURE NOT INDICATED BY UA RESULT CNI         Radiologic Studies:  XR CHEST PORT   Final Result      Mild bibasilar atelectasis. CT HEAD WO CONT   Final Result   No acute intracranial process. No significant change from the prior study.             CT SPINE CERV WO CONT   Final Result   No acute fracture or subluxation. CT Results  (Last 48 hours)                 01/04/23 0249  CT HEAD WO CONT Final result    Impression:  No acute intracranial process. No significant change from the prior study. Narrative:  EXAM: CT HEAD WO CONT       INDICATION: recurrent falls, syncope       COMPARISON: January 18, 2022. CONTRAST: None. TECHNIQUE: Unenhanced CT of the head was performed using 5 mm images. Brain and   bone windows were generated. Coronal and sagittal reformats. CT dose reduction   was achieved through use of a standardized protocol tailored for this   examination and automatic exposure control for dose modulation. FINDINGS:   The ventricles and sulci are stable in size, shape and configuration. Old right   frontal lobe infarct is unchanged. There is no significant white matter disease. There is no intracranial hemorrhage, extra-axial collection, or mass effect. The   basilar cisterns are open. No CT evidence of acute infarct. The bone windows demonstrate no abnormalities. The visualized portions of the   paranasal sinuses and mastoid air cells are clear. 01/04/23 0249  CT SPINE CERV WO CONT Final result    Impression:  No acute fracture or subluxation. Narrative:  EXAM:  CT CERVICAL SPINE WITHOUT CONTRAST       INDICATION: recurrent falls. COMPARISON: None. CONTRAST:  None. TECHNIQUE: Multislice helical CT of the cervical spine was performed without   intravenous contrast administration. Sagittal and coronal reformats were   generated. CT dose reduction was achieved through use of a standardized   protocol tailored for this examination and automatic exposure control for dose   modulation. FINDINGS:       The alignment is within normal limits. There is no fracture or compression   deformity. The odontoid process is intact. The craniocervical junction is within   normal limits. The incidentally imaged soft tissues are within normal limits. CXR Results  (Last 48 hours)                 01/04/23 0330  XR CHEST PORT Final result    Impression:      Mild bibasilar atelectasis. Narrative:  EXAM:  XR CHEST PORT       INDICATION: Recent pneumonia       COMPARISON: March 21, 2019       TECHNIQUE: Portable chest AP view       FINDINGS: The thoracic aorta is tortuous and atherosclerotic. The cardiac   silhouette is within normal limits. There is mild bibasilar atelectasis. No   pleural effusion is evident. The visualized bones and upper abdomen are   unremarkable. Medical Decision Making     I am the first provider for this patient. Initial assessment performed. The patients presenting problems have been discussed, and they are in agreement with the care plan formulated and outlined with them. I have encouraged them to ask questions as they arise throughout their visit. EKG, as interpretated by me:  2255: NSR, nl Axis, rate 65; , , QTc 507; NO STEMI; interpreted by Allison Moore MD    I personally reviewed/interpreted pt's imaging of CT head and CTA head and neck. Agree with official read by radiology as noted above.   Allison Moore MD      DDX:  Intracranial bleed, orthostatic syncope, close head injury, dehydration, electrolyte derangement, UTI    Comorbidities:  Past Medical History:   Diagnosis Date    Aneurysm ([facility] [address]     Anxiety 01/28/2010    Brain aneurysm     2.5 mm    CAD (coronary artery disease)     carotid stenosis    Chronic kidney disease     CVA (cerebral vascular accident) ([facility] [address] 03/2021    right hemispheric    DM (diabetes mellitus) ([facility] [address] 01/28/2010    GERD (gastroesophageal reflux disease)     HTN (hypertension) 01/28/2010    Hyperlipidemia 01/28/2010    IBS (irritable bowel syndrome)     POTS (postural orthostatic tachycardia syndrome)     Psychiatric disorder     PUD (peptic ulcer disease)     Thromboembolus [facility])          Plan:  Patient is a 23-year-old female with significant past medical history for 2.5 mm brain aneurysm, CAD, previous CVA, diabetes, reflux, hypertension, severe peripheral artery disease and POTS. Patient noted to have syncopal episode this evening that was more prolonged than her normal with potential for having struck her head. We will further evaluate for intracranial bleeding or other etiology for her recurrence of symptoms with CT, CTA head and neck. Will evaluate for potential electrolyte derangement with labs and for signs of infection with CBC and UA, chest x-ray. We will have nursing perform orthostatic testing and provide IV fluids if positive/symptomatic. ED COURSE      Records Review:  I reviewed and interpreted the nursing notes and and vital signs from today's visit, as well as the electronic medical record system for any external medical records that were available that may contribute to the patients current condition, including independent interpretation of previous admission for severe peripheral artery disease status post amputation of toes    Nursing notes will be reviewed and interpreted by me as they become available in realtime while the pt has been in the ED. Pulse Oximetry Analysis:  100% on RA, normal    Heart Monitory Analysis:  Rate: 74 bpm  Rhythm: nsr    Vital Signs-Reviewed the patient's vital signs.   Patient Vitals for the past 12 hrs:   Temp Pulse Resp BP SpO2   01/04/23 0551 -- 74 -- (!) 57/47 100 %   01/04/23 0549 -- 73 -- (!) 72/51 96 %   01/04/23 0547 -- 73 -- (!) 103/52 97 %   01/04/23 0511 -- 76 -- 137/65 --   01/04/23 0449 -- 75 -- 124/67 --   01/04/23 0433 -- 73 -- (!) 56/42 --   01/04/23 0432 -- 68 -- (!) 69/49 --   01/04/23 0431 -- 73 -- 97/68 --   01/04/23 0403 -- 74 16 130/60 96 %   01/04/23 0324 -- 80 18 120/70 100 %   01/04/23 0232 97.3 °F (36.3 °C) 97 18 98/79 --   01/03/23 2246 98.1 °F (36.7 °C) 64 16 98/72 100 %             ED Course as of 01/04/23 0722   Wed Jan 04, 2023   0445 Patient severely orthostatic when tested noting systolic blood pressure in the 50s. Will provide IV fluids and midodrine and reevaluate after medications. If still severely orthostatic will plan for admission to hospitalist service. []   7332 Despite IV fluids and midodrine, patient continues to be severely orthostatic. Will admit to hospitalist. [MK]      ED Course User Index  [MK] Maria Eugenia Monzon MD         Social Determinants of Health:  none    CONSULT NOTE:   Sonny Brown MD spoke with Dr. Bhupendra Lamas,   Specialty: Edra Dense Dr. Bhupendra Lamas due to severe orthostatic hypotension and syncope. Discussed pt's HPI and available diagnostic results thus far. Expressed concerns for needed admission. Consultant will evaluate for admission. Matthew Gilliam MD      ADMISSION NOTE:  6:16 AM  Patient is being admitted to the hospital by Dr. Bhupendra Lamas. The results of their tests and reasons for their admission have been discussed with them and/or available family. They convey agreement and understanding for the need to be admitted and for their admission diagnosis.    Matthew Gilliam MD            Critical Care Time:    CRITICAL CARE NOTE  IMPENDING DETERIORATION -Airway, Respiratory, Cardiovascular, CNS, Metabolic, Renal, and Hepatic  ASSOCIATED RISK FACTORS - Hypotension, Bleeding, Dysrhythmia, Metabolic changes, Dehydration, Vascular Compromise, and CNS Decompensation  MANAGEMENT- Bedside Assessment and Supervision of Care  INTERPRETATION -  Xrays, CT Scan, ECG, and Blood Pressure  INTERVENTIONS - hemodynamic mngmt, vascular control, Neurologic interventions , and Metobolic interventions  CASE REVIEW - Hospitalist/Intensivist, Nursing, and Family  TREATMENT RESPONSE -Improved  PERFORMED BY - Self  NOTES   :  I have spent 62 minutes of critical care time involved in lab review, consultations with specialist, family decision- making, bedside attention and documentation excluding time spent on any separately billed procedures. During this entire length of time I was immediately available to the patient . Darius Jasmine MD        Diagnosis     Clinical Impression:   1. Orthostatic hypotension    2. Syncope and collapse        PLAN:  1.  Admit to hospitalist

## 2023-01-04 NOTE — PROGRESS NOTES
PHYSICAL THERAPY EVALUATION/DISCHARGE  Patient: Anirudh Rodriguez (89 y.o. female)  Date: 1/4/2023  Primary Diagnosis: Orthostatic hypotension [I95.1]       Precautions:  Fall, Contact (VRE, ESBL)      ASSESSMENT  Based on the objective data described below, the patient presents with baseline functional mobility. She is orthostatic hypotensive but reports same as baseline. Patient has history of POTS and reports having managed hypotension with positional changes and medication. Patient tolerated hallway ambulation despite hypotension and BP positively responded. Discussed use of rollator walker to allow for ability to sit abruptly as needed. Patient states she is at her baseline and comfortable with returning home. No additional PT services needed. Functional Outcome Measure: The patient scored 100/100 on the Barthel Index outcome measure which is indicative of independence. Other factors to consider for discharge: supportive family      Further skilled acute physical therapy is not indicated at this time. PLAN :  Recommendation for discharge: (in order for the patient to meet his/her long term goals)  No skilled physical therapy/ follow up rehabilitation needs identified at this time. This discharge recommendation:  Has been made in collaboration with the attending provider and/or case management    IF patient discharges home will need the following DME: rollator walker       SUBJECTIVE:   Patient stated I have been dealing with this blood pressure for years.     OBJECTIVE DATA SUMMARY:   HISTORY:    Past Medical History:   Diagnosis Date    [facility]     Anxiety 01/28/2010    Brain aneurysm     2.5 mm    CAD (coronary artery disease)     carotid stenosis    Chronic kidney disease     CVA (cerebral vascular accident) ([facility] 03/2021    right hemispheric    DM (diabetes mellitus) [facility] 01/28/2010    GERD (gastroesophageal reflux disease)     HTN (hypertension) 01/28/2010    Hyperlipidemia 01/28/2010    IBS (irritable bowel syndrome)     POTS (postural orthostatic tachycardia syndrome)     Psychiatric disorder     PUD (peptic ulcer disease)     Thromboembolus (HCC)      Past Surgical History:   Procedure Laterality Date    HX AMPUTATION TOE Right 2019    HX AMPUTATION TOE Left 06/2022    partial 2nd toe    HX COLONOSCOPY      IR CHOLECYSTOSTOMY PERCUTANEOUS  2016       Prior level of function: prior independence to modified independence   Personal factors and/or comorbidities impacting plan of care:   Home Situation  Home Environment: Private residence  # Steps to Enter: 4  Rails to Enter: Yes  Hand Rails : Bilateral  One/Two Story Residence: One story (with 1 step)  Living Alone: No (lives with son, he works during the day)  [place]: Child(dion)  Patient Expects to be Discharged to[de-identified] Home  Current DME Used/Available at Home: Walker, rolling, Grab bars, Tub transfer bench  Tub or Shower Type: Tub/Shower combination    EXAMINATION/PRESENTATION/DECISION MAKING:   Critical Behavior:  Neurologic State: Alert  Orientation Level: Oriented X4  Cognition: Appropriate decision making, Appropriate for age attention/concentration, Appropriate safety awareness  Safety/Judgement: Awareness of environment, Fall prevention, Home safety, Insight into deficits  Hearing: Auditory  Auditory Impairment: None    Range Of Motion:  AROM: Within functional limits           PROM: Within functional limits           Strength:    Strength:  Within functional limits                    Tone & Sensation:   Tone: Normal              Sensation: Impaired (numbness in hand at times, numbness from bilateral feet to thighs)               Coordination:  Coordination: Within functional limits  Vision:   Corrective Lenses: Reading glasses  Functional Mobility:  Bed Mobility:  Rolling: Independent  Supine to Sit: Independent  Sit to Supine: Independent     Transfers:  Sit to Stand: Independent  Stand to Sit: Independent     Balance: Sitting: Intact  Standing: Intact  Ambulation/Gait Training:  Distance (ft): 60 Feet (ft)  Assistive Device: Walker, rolling  Ambulation - Level of Assistance: Modified independent                 Base of Support: Widened     Speed/Belem: Slow                     Slow but steady with support of RW. Stairs:   Reviewed stair safety         Functional Measure:  Barthel Index:    Bathin  Bladder: 10  Bowels: 10  Groomin  Dressing: 10  Feeding: 10  Mobility: 15  Stairs: 10  Toilet Use: 10  Transfer (Bed to Chair and Back): 15  Total: 100/100     The Barthel ADL Index: Guidelines  1. The index should be used as a record of what a patient does, not as a record of what a patient could do. 2. The main aim is to establish degree of independence from any help, physical or verbal, however minor and for whatever reason. 3. The need for supervision renders the patient not independent. 4. A patient's performance should be established using the best available evidence. Asking the patient, friends/relatives and nurses are the usual sources, but direct observation and common sense are also important. However direct testing is not needed. 5. Usually the patient's performance over the preceding 24-48 hours is important, but occasionally longer periods will be relevant. 6. Middle categories imply that the patient supplies over 50 per cent of the effort. 7. Use of aids to be independent is allowed. Score Interpretation (from [address] [address])    Independent   60-79 Minimally independent   40-59 Partially dependent   20-39 Very dependent   <20 Totally dependent     -Julee Bae., Barthel, D.W. (1965). Functional evaluation: the Barthel Index. [address] [facility] ([address] (1997). The Barthel activities of daily living index: self-reporting versus actual performance in the old (> or = 75 years).  Journal of [address] [place] [address] 45(7), [facility], JKATT, Anju Flores, Max Armstrong.Tad (1999). Measuring the change in disability after inpatient rehabilitation; comparison of the responsiveness of the Barthel Index and Functional [place] Measure. Journal of Neurology, Neurosurgery, and Psychiatry, 66(4), 229-480. THADDEUS Garcia, ROMAN Brown, & Mavis Beaver M.A. (2004) Assessment of post-stroke quality of life in cost-effectiveness studies: The usefulness of the Barthel Index and the EuroQoL-5D. Quality of Life Research, 15, 786-86         Physical Therapy Evaluation Charge Determination   History Examination Presentation Decision-Making   MEDIUM  Complexity : 1-2 comorbidities / personal factors will impact the outcome/ POC  MEDIUM Complexity : 3 Standardized tests and measures addressing body structure, function, activity limitation and / or participation in recreation  MEDIUM Complexity : Evolving with changing characteristics  Other outcome measures Barthel Index  MEDIUM      Based on the above components, the patient evaluation is determined to be of the following complexity level: MEDIUM    Pain Rating:  No c/o pain     Activity Tolerance:   Fair and signs and symptoms of orthostatic hypotension (has had orthostatic hypotension for years)       After treatment patient left in no apparent distress:   Supine in bed     COMMUNICATION/EDUCATION:   The patients plan of care was discussed with: Occupational therapist and Registered nurse. Fall prevention education was provided and the patient/caregiver indicated understanding.     Thank you for this referral.  Delilah Mays, PT, DPT   Time Calculation: 20 mins

## 2023-01-04 NOTE — ED NOTES
Verbal shift change report given to Meredith Urbina RN (oncoming nurse) by Wilfrido Recinos RN (offgoing nurse). Report included the following information SBAR and ED Summary.

## 2023-01-04 NOTE — DISCHARGE INSTRUCTIONS
All of your medications from before your hospitalization are the same EXCEPT:  STOP taking hydralazine. Your new prescription for you to start is midodrine at an increased dose for orthostatic hypotension. You must use a walker to ambulate at home. Please take all of your medications as prescribed. If prescribed any medications, please read all pharmacy instructions and inserts. Inform your doctor and pharmacist about all other medications and alternative therapies. Please follow up with your PCP in 1 week to be reassessed after your hospital stay. Please also follow up with cardiology and vascular surgery to discuss possibly using compression stockings or abdominal binder, they need to clear you in the setting of your peripheral vascular disease. If you start feeling any symptoms similar to what brought you into the hospital, please come back to the ED to be re-evaluated.

## 2023-01-04 NOTE — PROGRESS NOTES
01/04/23 1117 01/04/23 1118 01/04/23 1124   Vital Signs   /75 (!) 85/67 (!) 95/49   MAP (Calculated) 94 73 (!) 64   BP 1 Location Left upper arm Left upper arm Left upper arm   BP 1 Method Automatic Automatic Automatic   BP Patient Position Supine Sitting Sitting  (after ambulation down castro and back)

## 2023-01-04 NOTE — PROGRESS NOTES
5:34 PM  Pt has been declined for rollator d/t having a walker within the past 5 years, will not pay for 2 walking devices within 5 year period per Medicare guidelines. Notified pt via phone, offered other suggestions for means to obtain via private pay, pt to order one online if needed. No further CM questions. 2:30 PM  CM attempted to meet with pt to discuss d/c planning, pt had already d/c'd from ED. CM contacted pt via mobile number on chart, pt reports that she and her son are on the way home. Discussed PT's recommendations for a rollator, pt is agreeable to CM placing order. CM has placed order to [facility] via [employer] and [facility] will coordinate home delivery with pt directly. No further CM needs. CM will sign off.     Michael Ricoo, Leny Larsen [address], [address]

## 2023-04-12 ENCOUNTER — HOSPITAL ENCOUNTER (INPATIENT)
Age: 63
LOS: 5 days | Discharge: NURSING, REHAB OR LONG-TERM CARE | DRG: 378 | End: 2023-04-17
Attending: EMERGENCY MEDICINE | Admitting: INTERNAL MEDICINE
Payer: MEDICARE

## 2023-04-12 ENCOUNTER — APPOINTMENT (OUTPATIENT)
Dept: GENERAL RADIOLOGY | Age: 63
DRG: 378 | End: 2023-04-12
Attending: EMERGENCY MEDICINE
Payer: MEDICARE

## 2023-04-12 ENCOUNTER — APPOINTMENT (OUTPATIENT)
Dept: CT IMAGING | Age: 63
DRG: 378 | End: 2023-04-12
Attending: EMERGENCY MEDICINE
Payer: MEDICARE

## 2023-04-12 DIAGNOSIS — J18.9 PNEUMONIA OF RIGHT LOWER LOBE DUE TO INFECTIOUS ORGANISM: Primary | ICD-10-CM

## 2023-04-12 DIAGNOSIS — K92.2 UGI BLEED: ICD-10-CM

## 2023-04-12 DIAGNOSIS — D62 ACUTE BLOOD LOSS ANEMIA: ICD-10-CM

## 2023-04-12 LAB
ABO + RH BLD: NORMAL
ALBUMIN SERPL-MCNC: 1.4 G/DL (ref 3.5–5)
ALBUMIN/GLOB SERPL: 0.4 (ref 1.1–2.2)
ALP SERPL-CCNC: 88 U/L (ref 45–117)
ALT SERPL-CCNC: 29 U/L (ref 12–78)
ANION GAP SERPL CALC-SCNC: 4 MMOL/L (ref 5–15)
APTT PPP: 43.4 SEC (ref 22.1–31)
AST SERPL-CCNC: 58 U/L (ref 15–37)
BASOPHILS # BLD: 0 K/UL (ref 0–0.1)
BASOPHILS NFR BLD: 0 % (ref 0–1)
BILIRUB SERPL-MCNC: 0.4 MG/DL (ref 0.2–1)
BLOOD GROUP ANTIBODIES SERPL: NORMAL
BUN SERPL-MCNC: 18 MG/DL (ref 6–20)
BUN/CREAT SERPL: 9 (ref 12–20)
CALCIUM SERPL-MCNC: 7.6 MG/DL (ref 8.5–10.1)
CHLORIDE SERPL-SCNC: 101 MMOL/L (ref 97–108)
CO2 SERPL-SCNC: 31 MMOL/L (ref 21–32)
CREAT SERPL-MCNC: 2.07 MG/DL (ref 0.55–1.02)
DIFFERENTIAL METHOD BLD: ABNORMAL
EOSINOPHIL # BLD: 0 K/UL (ref 0–0.4)
EOSINOPHIL NFR BLD: 0 % (ref 0–7)
ERYTHROCYTE [DISTWIDTH] IN BLOOD BY AUTOMATED COUNT: 15 % (ref 11.5–14.5)
FLUAV AG NPH QL IA: NEGATIVE
FLUBV AG NOSE QL IA: NEGATIVE
GLOBULIN SER CALC-MCNC: 3.9 G/DL (ref 2–4)
GLUCOSE SERPL-MCNC: 93 MG/DL (ref 65–100)
HCT VFR BLD AUTO: 27.4 % (ref 35–47)
HGB BLD-MCNC: 8.7 G/DL (ref 11.5–16)
IMM GRANULOCYTES # BLD AUTO: 0.1 K/UL (ref 0–0.04)
IMM GRANULOCYTES NFR BLD AUTO: 1 % (ref 0–0.5)
INR PPP: 1.8 (ref 0.9–1.1)
LACTATE BLD-SCNC: 2.1 MMOL/L (ref 0.4–2)
LYMPHOCYTES # BLD: 1.9 K/UL (ref 0.8–3.5)
LYMPHOCYTES NFR BLD: 16 % (ref 12–49)
MCH RBC QN AUTO: 34.7 PG (ref 26–34)
MCHC RBC AUTO-ENTMCNC: 31.8 G/DL (ref 30–36.5)
MCV RBC AUTO: 109.2 FL (ref 80–99)
MONOCYTES # BLD: 1.4 K/UL (ref 0–1)
MONOCYTES NFR BLD: 12 % (ref 5–13)
NEUTS SEG # BLD: 8.2 K/UL (ref 1.8–8)
NEUTS SEG NFR BLD: 70 % (ref 32–75)
NRBC # BLD: 0 K/UL (ref 0–0.01)
NRBC BLD-RTO: 0 PER 100 WBC
PLATELET # BLD AUTO: 98 K/UL (ref 150–400)
PMV BLD AUTO: 10.4 FL (ref 8.9–12.9)
POTASSIUM SERPL-SCNC: 2.6 MMOL/L (ref 3.5–5.1)
PROT SERPL-MCNC: 5.3 G/DL (ref 6.4–8.2)
PROTHROMBIN TIME: 18.2 SEC (ref 9–11.1)
RBC # BLD AUTO: 2.51 M/UL (ref 3.8–5.2)
SARS-COV-2 RDRP RESP QL NAA+PROBE: NOT DETECTED
SODIUM SERPL-SCNC: 136 MMOL/L (ref 136–145)
SOURCE, COVRS: NORMAL
SPECIMEN EXP DATE BLD: NORMAL
THERAPEUTIC RANGE,PTTT: ABNORMAL SECS (ref 58–77)
TROPONIN I SERPL HS-MCNC: 17 NG/L (ref 0–51)
WBC # BLD AUTO: 11.7 K/UL (ref 3.6–11)

## 2023-04-12 PROCEDURE — 74011250636 HC RX REV CODE- 250/636: Performed by: EMERGENCY MEDICINE

## 2023-04-12 PROCEDURE — 71045 X-RAY EXAM CHEST 1 VIEW: CPT

## 2023-04-12 PROCEDURE — 74011250637 HC RX REV CODE- 250/637: Performed by: INTERNAL MEDICINE

## 2023-04-12 PROCEDURE — 96374 THER/PROPH/DIAG INJ IV PUSH: CPT

## 2023-04-12 PROCEDURE — 96375 TX/PRO/DX INJ NEW DRUG ADDON: CPT

## 2023-04-12 PROCEDURE — 99285 EMERGENCY DEPT VISIT HI MDM: CPT

## 2023-04-12 PROCEDURE — 85025 COMPLETE CBC W/AUTO DIFF WBC: CPT

## 2023-04-12 PROCEDURE — 83605 ASSAY OF LACTIC ACID: CPT

## 2023-04-12 PROCEDURE — 85730 THROMBOPLASTIN TIME PARTIAL: CPT

## 2023-04-12 PROCEDURE — 87804 INFLUENZA ASSAY W/OPTIC: CPT

## 2023-04-12 PROCEDURE — 74011250636 HC RX REV CODE- 250/636: Performed by: INTERNAL MEDICINE

## 2023-04-12 PROCEDURE — 36415 COLL VENOUS BLD VENIPUNCTURE: CPT

## 2023-04-12 PROCEDURE — 70450 CT HEAD/BRAIN W/O DYE: CPT

## 2023-04-12 PROCEDURE — 80053 COMPREHEN METABOLIC PANEL: CPT

## 2023-04-12 PROCEDURE — 65270000046 HC RM TELEMETRY

## 2023-04-12 PROCEDURE — C9113 INJ PANTOPRAZOLE SODIUM, VIA: HCPCS | Performed by: EMERGENCY MEDICINE

## 2023-04-12 PROCEDURE — 85610 PROTHROMBIN TIME: CPT

## 2023-04-12 PROCEDURE — 93005 ELECTROCARDIOGRAM TRACING: CPT

## 2023-04-12 PROCEDURE — 74011000258 HC RX REV CODE- 258: Performed by: EMERGENCY MEDICINE

## 2023-04-12 PROCEDURE — 86900 BLOOD TYPING SEROLOGIC ABO: CPT

## 2023-04-12 PROCEDURE — 74176 CT ABD & PELVIS W/O CONTRAST: CPT

## 2023-04-12 PROCEDURE — 87040 BLOOD CULTURE FOR BACTERIA: CPT

## 2023-04-12 PROCEDURE — 84484 ASSAY OF TROPONIN QUANT: CPT

## 2023-04-12 PROCEDURE — 87635 SARS-COV-2 COVID-19 AMP PRB: CPT

## 2023-04-12 RX ORDER — FOLIC ACID 1 MG/1
1 TABLET ORAL DAILY
Status: DISCONTINUED | OUTPATIENT
Start: 2023-04-13 | End: 2023-04-17 | Stop reason: HOSPADM

## 2023-04-12 RX ORDER — SODIUM CHLORIDE 0.9 % (FLUSH) 0.9 %
5-10 SYRINGE (ML) INJECTION AS NEEDED
Status: DISCONTINUED | OUTPATIENT
Start: 2023-04-12 | End: 2023-04-17 | Stop reason: HOSPADM

## 2023-04-12 RX ORDER — ROSUVASTATIN CALCIUM 40 MG/1
40 TABLET, COATED ORAL
Status: DISCONTINUED | OUTPATIENT
Start: 2023-04-12 | End: 2023-04-17 | Stop reason: HOSPADM

## 2023-04-12 RX ORDER — UREA 10 %
325 LOTION (ML) TOPICAL
Status: DISCONTINUED | OUTPATIENT
Start: 2023-04-13 | End: 2023-04-17 | Stop reason: HOSPADM

## 2023-04-12 RX ORDER — BUSPIRONE HYDROCHLORIDE 10 MG/1
10 TABLET ORAL 3 TIMES DAILY
Status: DISCONTINUED | OUTPATIENT
Start: 2023-04-12 | End: 2023-04-17 | Stop reason: HOSPADM

## 2023-04-12 RX ORDER — GUAIFENESIN 600 MG/1
1200 TABLET, EXTENDED RELEASE ORAL EVERY 12 HOURS
Status: DISCONTINUED | OUTPATIENT
Start: 2023-04-12 | End: 2023-04-17 | Stop reason: HOSPADM

## 2023-04-12 RX ORDER — DULOXETIN HYDROCHLORIDE 30 MG/1
30 CAPSULE, DELAYED RELEASE ORAL DAILY
Status: DISCONTINUED | OUTPATIENT
Start: 2023-04-13 | End: 2023-04-17 | Stop reason: HOSPADM

## 2023-04-12 RX ORDER — QUETIAPINE FUMARATE 100 MG/1
50 TABLET, FILM COATED ORAL DAILY
Status: DISCONTINUED | OUTPATIENT
Start: 2023-04-13 | End: 2023-04-17 | Stop reason: HOSPADM

## 2023-04-12 RX ORDER — QUETIAPINE FUMARATE 100 MG/1
200 TABLET, FILM COATED ORAL
Status: DISCONTINUED | OUTPATIENT
Start: 2023-04-12 | End: 2023-04-17 | Stop reason: HOSPADM

## 2023-04-12 RX ORDER — DROPERIDOL 2.5 MG/ML
0.62 INJECTION, SOLUTION INTRAMUSCULAR; INTRAVENOUS ONCE
Status: COMPLETED | OUTPATIENT
Start: 2023-04-12 | End: 2023-04-12

## 2023-04-12 RX ORDER — METOPROLOL TARTRATE 25 MG/1
25 TABLET, FILM COATED ORAL 2 TIMES DAILY
Status: DISCONTINUED | OUTPATIENT
Start: 2023-04-12 | End: 2023-04-17 | Stop reason: HOSPADM

## 2023-04-12 RX ORDER — PANTOPRAZOLE SODIUM 40 MG/10ML
40 INJECTION, POWDER, LYOPHILIZED, FOR SOLUTION INTRAVENOUS
Status: COMPLETED | OUTPATIENT
Start: 2023-04-12 | End: 2023-04-12

## 2023-04-12 RX ORDER — PANTOPRAZOLE SODIUM 40 MG/1
40 TABLET, DELAYED RELEASE ORAL 2 TIMES DAILY
Status: DISCONTINUED | OUTPATIENT
Start: 2023-04-12 | End: 2023-04-17 | Stop reason: HOSPADM

## 2023-04-12 RX ORDER — SODIUM CHLORIDE 9 MG/ML
75 INJECTION, SOLUTION INTRAVENOUS CONTINUOUS
Status: DISCONTINUED | OUTPATIENT
Start: 2023-04-12 | End: 2023-04-17 | Stop reason: HOSPADM

## 2023-04-12 RX ORDER — POTASSIUM CHLORIDE 7.45 MG/ML
10 INJECTION INTRAVENOUS
Status: COMPLETED | OUTPATIENT
Start: 2023-04-12 | End: 2023-04-13

## 2023-04-12 RX ORDER — LANOLIN ALCOHOL/MO/W.PET/CERES
400 CREAM (GRAM) TOPICAL DAILY
Status: DISCONTINUED | OUTPATIENT
Start: 2023-04-13 | End: 2023-04-17 | Stop reason: HOSPADM

## 2023-04-12 RX ORDER — QUETIAPINE FUMARATE 25 MG/1
50 TABLET, FILM COATED ORAL DAILY
Status: DISCONTINUED | OUTPATIENT
Start: 2023-04-13 | End: 2023-04-12

## 2023-04-12 RX ORDER — INSULIN LISPRO 100 [IU]/ML
INJECTION, SOLUTION INTRAVENOUS; SUBCUTANEOUS
Status: DISCONTINUED | OUTPATIENT
Start: 2023-04-12 | End: 2023-04-17 | Stop reason: HOSPADM

## 2023-04-12 RX ORDER — ASCORBIC ACID 500 MG
250 TABLET ORAL DAILY
Status: DISCONTINUED | OUTPATIENT
Start: 2023-04-13 | End: 2023-04-17 | Stop reason: HOSPADM

## 2023-04-12 RX ORDER — DEXTROSE MONOHYDRATE 100 MG/ML
0-250 INJECTION, SOLUTION INTRAVENOUS AS NEEDED
Status: DISCONTINUED | OUTPATIENT
Start: 2023-04-12 | End: 2023-04-17 | Stop reason: HOSPADM

## 2023-04-12 RX ORDER — METOPROLOL TARTRATE 25 MG/1
12.5 TABLET, FILM COATED ORAL 2 TIMES DAILY
Status: DISCONTINUED | OUTPATIENT
Start: 2023-04-12 | End: 2023-04-12

## 2023-04-12 RX ORDER — IBUPROFEN 200 MG
4 TABLET ORAL AS NEEDED
Status: DISCONTINUED | OUTPATIENT
Start: 2023-04-12 | End: 2023-04-17 | Stop reason: HOSPADM

## 2023-04-12 RX ADMIN — POTASSIUM CHLORIDE 10 MEQ: 7.46 INJECTION, SOLUTION INTRAVENOUS at 21:01

## 2023-04-12 RX ADMIN — BUSPIRONE HYDROCHLORIDE 10 MG: 10 TABLET ORAL at 20:14

## 2023-04-12 RX ADMIN — QUETIAPINE FUMARATE 200 MG: 100 TABLET ORAL at 22:18

## 2023-04-12 RX ADMIN — ROSUVASTATIN CALCIUM 40 MG: 40 TABLET, FILM COATED ORAL at 22:18

## 2023-04-12 RX ADMIN — SODIUM CHLORIDE 1 G: 900 INJECTION INTRAVENOUS at 16:22

## 2023-04-12 RX ADMIN — SODIUM CHLORIDE 75 ML/HR: 9 INJECTION, SOLUTION INTRAVENOUS at 22:20

## 2023-04-12 RX ADMIN — SODIUM CHLORIDE 1000 ML: 9 INJECTION, SOLUTION INTRAVENOUS at 15:14

## 2023-04-12 RX ADMIN — AZITHROMYCIN DIHYDRATE 500 MG: 500 INJECTION, POWDER, LYOPHILIZED, FOR SOLUTION INTRAVENOUS at 16:35

## 2023-04-12 RX ADMIN — POTASSIUM CHLORIDE 10 MEQ: 7.46 INJECTION, SOLUTION INTRAVENOUS at 22:19

## 2023-04-12 RX ADMIN — METOPROLOL TARTRATE 25 MG: 25 TABLET, FILM COATED ORAL at 20:14

## 2023-04-12 RX ADMIN — DROPERIDOL 0.62 MG: 2.5 INJECTION, SOLUTION INTRAMUSCULAR; INTRAVENOUS at 15:00

## 2023-04-12 RX ADMIN — POTASSIUM CHLORIDE 10 MEQ: 7.46 INJECTION, SOLUTION INTRAVENOUS at 20:14

## 2023-04-12 RX ADMIN — PANTOPRAZOLE SODIUM 40 MG: 40 INJECTION, POWDER, LYOPHILIZED, FOR SOLUTION INTRAVENOUS at 14:59

## 2023-04-12 RX ADMIN — GUAIFENESIN 1200 MG: 600 TABLET, EXTENDED RELEASE ORAL at 20:14

## 2023-04-12 RX ADMIN — SODIUM CHLORIDE 1000 ML: 9 INJECTION, SOLUTION INTRAVENOUS at 14:57

## 2023-04-12 RX ADMIN — SODIUM CHLORIDE 800 ML: 9 INJECTION, SOLUTION INTRAVENOUS at 20:32

## 2023-04-12 RX ADMIN — PANTOPRAZOLE SODIUM 40 MG: 40 TABLET, DELAYED RELEASE ORAL at 20:14

## 2023-04-12 RX ADMIN — POTASSIUM CHLORIDE 10 MEQ: 7.46 INJECTION, SOLUTION INTRAVENOUS at 23:06

## 2023-04-13 LAB
ALBUMIN SERPL-MCNC: 1.3 G/DL (ref 3.5–5)
ALBUMIN/GLOB SERPL: 0.4 (ref 1.1–2.2)
ALP SERPL-CCNC: 88 U/L (ref 45–117)
ALT SERPL-CCNC: 32 U/L (ref 12–78)
ANION GAP SERPL CALC-SCNC: 4 MMOL/L (ref 5–15)
AST SERPL-CCNC: 65 U/L (ref 15–37)
ATRIAL RATE: 78 BPM
B PERT DNA SPEC QL NAA+PROBE: NOT DETECTED
BASOPHILS # BLD: 0 K/UL (ref 0–0.1)
BASOPHILS NFR BLD: 0 % (ref 0–1)
BILIRUB SERPL-MCNC: 0.3 MG/DL (ref 0.2–1)
BORDETELLA PARAPERTUSSIS PCR, BORPAR: NOT DETECTED
BUN SERPL-MCNC: 15 MG/DL (ref 6–20)
BUN/CREAT SERPL: 8 (ref 12–20)
C PNEUM DNA SPEC QL NAA+PROBE: NOT DETECTED
CALCIUM SERPL-MCNC: 7.3 MG/DL (ref 8.5–10.1)
CALCULATED P AXIS, ECG09: 33 DEGREES
CALCULATED R AXIS, ECG10: 5 DEGREES
CALCULATED T AXIS, ECG11: 64 DEGREES
CHLORIDE SERPL-SCNC: 109 MMOL/L (ref 97–108)
CO2 SERPL-SCNC: 26 MMOL/L (ref 21–32)
CREAT SERPL-MCNC: 1.81 MG/DL (ref 0.55–1.02)
DIAGNOSIS, 93000: NORMAL
DIFFERENTIAL METHOD BLD: ABNORMAL
EOSINOPHIL # BLD: 0.1 K/UL (ref 0–0.4)
EOSINOPHIL NFR BLD: 1 % (ref 0–7)
ERYTHROCYTE [DISTWIDTH] IN BLOOD BY AUTOMATED COUNT: 14.9 % (ref 11.5–14.5)
EST. AVERAGE GLUCOSE BLD GHB EST-MCNC: 108 MG/DL
FERRITIN SERPL-MCNC: 261 NG/ML (ref 8–252)
FLUAV SUBTYP SPEC NAA+PROBE: NOT DETECTED
FLUBV RNA SPEC QL NAA+PROBE: NOT DETECTED
GLOBULIN SER CALC-MCNC: 3.7 G/DL (ref 2–4)
GLUCOSE BLD STRIP.AUTO-MCNC: 107 MG/DL (ref 65–117)
GLUCOSE BLD STRIP.AUTO-MCNC: 117 MG/DL (ref 65–117)
GLUCOSE BLD STRIP.AUTO-MCNC: 126 MG/DL (ref 65–117)
GLUCOSE BLD STRIP.AUTO-MCNC: 148 MG/DL (ref 65–117)
GLUCOSE SERPL-MCNC: 137 MG/DL (ref 65–100)
HADV DNA SPEC QL NAA+PROBE: NOT DETECTED
HBA1C MFR BLD: 5.4 % (ref 4–5.6)
HCOV 229E RNA SPEC QL NAA+PROBE: NOT DETECTED
HCOV HKU1 RNA SPEC QL NAA+PROBE: NOT DETECTED
HCOV NL63 RNA SPEC QL NAA+PROBE: NOT DETECTED
HCOV OC43 RNA SPEC QL NAA+PROBE: NOT DETECTED
HCT VFR BLD AUTO: 26.8 % (ref 35–47)
HGB BLD-MCNC: 8.6 G/DL (ref 11.5–16)
HMPV RNA SPEC QL NAA+PROBE: NOT DETECTED
HPIV1 RNA SPEC QL NAA+PROBE: NOT DETECTED
HPIV2 RNA SPEC QL NAA+PROBE: NOT DETECTED
HPIV3 RNA SPEC QL NAA+PROBE: NOT DETECTED
HPIV4 RNA SPEC QL NAA+PROBE: NOT DETECTED
IMM GRANULOCYTES # BLD AUTO: 0.1 K/UL (ref 0–0.04)
IMM GRANULOCYTES NFR BLD AUTO: 1 % (ref 0–0.5)
IRON SATN MFR SERPL: 26 % (ref 20–50)
IRON SERPL-MCNC: 26 UG/DL (ref 35–150)
LYMPHOCYTES # BLD: 1.9 K/UL (ref 0.8–3.5)
LYMPHOCYTES NFR BLD: 20 % (ref 12–49)
M PNEUMO DNA SPEC QL NAA+PROBE: NOT DETECTED
MAGNESIUM SERPL-MCNC: 1.8 MG/DL (ref 1.6–2.4)
MCH RBC QN AUTO: 34.8 PG (ref 26–34)
MCHC RBC AUTO-ENTMCNC: 32.1 G/DL (ref 30–36.5)
MCV RBC AUTO: 108.5 FL (ref 80–99)
MONOCYTES # BLD: 1 K/UL (ref 0–1)
MONOCYTES NFR BLD: 10 % (ref 5–13)
NEUTS SEG # BLD: 6.5 K/UL (ref 1.8–8)
NEUTS SEG NFR BLD: 68 % (ref 32–75)
NRBC # BLD: 0 K/UL (ref 0–0.01)
NRBC BLD-RTO: 0 PER 100 WBC
P-R INTERVAL, ECG05: 148 MS
PHOSPHATE SERPL-MCNC: 2.3 MG/DL (ref 2.6–4.7)
PLATELET # BLD AUTO: 85 K/UL (ref 150–400)
PMV BLD AUTO: 10.7 FL (ref 8.9–12.9)
POTASSIUM SERPL-SCNC: 3.3 MMOL/L (ref 3.5–5.1)
PROCALCITONIN SERPL-MCNC: 47.94 NG/ML
PROT SERPL-MCNC: 5 G/DL (ref 6.4–8.2)
Q-T INTERVAL, ECG07: 462 MS
QRS DURATION, ECG06: 102 MS
QTC CALCULATION (BEZET), ECG08: 526 MS
RBC # BLD AUTO: 2.47 M/UL (ref 3.8–5.2)
RBC MORPH BLD: ABNORMAL
RSV RNA SPEC QL NAA+PROBE: NOT DETECTED
RV+EV RNA SPEC QL NAA+PROBE: NOT DETECTED
SARS-COV-2 RNA RESP QL NAA+PROBE: NOT DETECTED
SERVICE CMNT-IMP: ABNORMAL
SERVICE CMNT-IMP: ABNORMAL
SERVICE CMNT-IMP: NORMAL
SERVICE CMNT-IMP: NORMAL
SODIUM SERPL-SCNC: 139 MMOL/L (ref 136–145)
TIBC SERPL-MCNC: 99 UG/DL (ref 250–450)
VENTRICULAR RATE, ECG03: 78 BPM
WBC # BLD AUTO: 9.6 K/UL (ref 3.6–11)

## 2023-04-13 PROCEDURE — 74011250636 HC RX REV CODE- 250/636: Performed by: INTERNAL MEDICINE

## 2023-04-13 PROCEDURE — 83540 ASSAY OF IRON: CPT

## 2023-04-13 PROCEDURE — 97535 SELF CARE MNGMENT TRAINING: CPT

## 2023-04-13 PROCEDURE — 80053 COMPREHEN METABOLIC PANEL: CPT

## 2023-04-13 PROCEDURE — 0202U NFCT DS 22 TRGT SARS-COV-2: CPT

## 2023-04-13 PROCEDURE — 82728 ASSAY OF FERRITIN: CPT

## 2023-04-13 PROCEDURE — 65270000029 HC RM PRIVATE

## 2023-04-13 PROCEDURE — 97530 THERAPEUTIC ACTIVITIES: CPT | Performed by: PHYSICAL THERAPIST

## 2023-04-13 PROCEDURE — 84100 ASSAY OF PHOSPHORUS: CPT

## 2023-04-13 PROCEDURE — 74011000250 HC RX REV CODE- 250: Performed by: NURSE PRACTITIONER

## 2023-04-13 PROCEDURE — 97161 PT EVAL LOW COMPLEX 20 MIN: CPT | Performed by: PHYSICAL THERAPIST

## 2023-04-13 PROCEDURE — 97165 OT EVAL LOW COMPLEX 30 MIN: CPT

## 2023-04-13 PROCEDURE — 36415 COLL VENOUS BLD VENIPUNCTURE: CPT

## 2023-04-13 PROCEDURE — 83735 ASSAY OF MAGNESIUM: CPT

## 2023-04-13 PROCEDURE — 85025 COMPLETE CBC W/AUTO DIFF WBC: CPT

## 2023-04-13 PROCEDURE — 74011250637 HC RX REV CODE- 250/637: Performed by: INTERNAL MEDICINE

## 2023-04-13 PROCEDURE — 97166 OT EVAL MOD COMPLEX 45 MIN: CPT

## 2023-04-13 PROCEDURE — 84145 PROCALCITONIN (PCT): CPT

## 2023-04-13 PROCEDURE — 82962 GLUCOSE BLOOD TEST: CPT

## 2023-04-13 PROCEDURE — 74011250637 HC RX REV CODE- 250/637: Performed by: NURSE PRACTITIONER

## 2023-04-13 PROCEDURE — 74011250636 HC RX REV CODE- 250/636: Performed by: EMERGENCY MEDICINE

## 2023-04-13 PROCEDURE — 83036 HEMOGLOBIN GLYCOSYLATED A1C: CPT

## 2023-04-13 PROCEDURE — 74011250637 HC RX REV CODE- 250/637: Performed by: STUDENT IN AN ORGANIZED HEALTH CARE EDUCATION/TRAINING PROGRAM

## 2023-04-13 RX ORDER — BISACODYL 5 MG
10 TABLET, DELAYED RELEASE (ENTERIC COATED) ORAL
Status: COMPLETED | OUTPATIENT
Start: 2023-04-13 | End: 2023-04-13

## 2023-04-13 RX ORDER — TRAMADOL HYDROCHLORIDE 50 MG/1
50 TABLET ORAL
Status: DISCONTINUED | OUTPATIENT
Start: 2023-04-13 | End: 2023-04-17 | Stop reason: HOSPADM

## 2023-04-13 RX ORDER — LANOLIN ALCOHOL/MO/W.PET/CERES
3 CREAM (GRAM) TOPICAL
Status: DISCONTINUED | OUTPATIENT
Start: 2023-04-13 | End: 2023-04-17 | Stop reason: HOSPADM

## 2023-04-13 RX ORDER — ACETAMINOPHEN 650 MG/1
650 SUPPOSITORY RECTAL
Status: DISCONTINUED | OUTPATIENT
Start: 2023-04-13 | End: 2023-04-17 | Stop reason: HOSPADM

## 2023-04-13 RX ORDER — ONDANSETRON 4 MG/1
4 TABLET, ORALLY DISINTEGRATING ORAL
Status: DISCONTINUED | OUTPATIENT
Start: 2023-04-13 | End: 2023-04-17 | Stop reason: HOSPADM

## 2023-04-13 RX ORDER — MAG HYDROX/ALUMINUM HYD/SIMETH 200-200-20
30 SUSPENSION, ORAL (FINAL DOSE FORM) ORAL
Status: DISCONTINUED | OUTPATIENT
Start: 2023-04-13 | End: 2023-04-17 | Stop reason: HOSPADM

## 2023-04-13 RX ORDER — POTASSIUM CHLORIDE 750 MG/1
40 TABLET, FILM COATED, EXTENDED RELEASE ORAL ONCE
Status: COMPLETED | OUTPATIENT
Start: 2023-04-13 | End: 2023-04-13

## 2023-04-13 RX ORDER — HYDROXYZINE HYDROCHLORIDE 10 MG/1
25 TABLET, FILM COATED ORAL
Status: DISCONTINUED | OUTPATIENT
Start: 2023-04-13 | End: 2023-04-17 | Stop reason: HOSPADM

## 2023-04-13 RX ORDER — HYDRALAZINE HYDROCHLORIDE 20 MG/ML
10 INJECTION INTRAMUSCULAR; INTRAVENOUS
Status: DISCONTINUED | OUTPATIENT
Start: 2023-04-13 | End: 2023-04-17 | Stop reason: HOSPADM

## 2023-04-13 RX ORDER — ACETAMINOPHEN 325 MG/1
650 TABLET ORAL
Status: DISCONTINUED | OUTPATIENT
Start: 2023-04-13 | End: 2023-04-17 | Stop reason: HOSPADM

## 2023-04-13 RX ORDER — ONDANSETRON 2 MG/ML
4 INJECTION INTRAMUSCULAR; INTRAVENOUS
Status: DISCONTINUED | OUTPATIENT
Start: 2023-04-13 | End: 2023-04-17 | Stop reason: HOSPADM

## 2023-04-13 RX ADMIN — FERROUS SULFATE TAB 325 MG (65 MG ELEMENTAL FE) 325 MG: 325 (65 FE) TAB at 09:09

## 2023-04-13 RX ADMIN — DULOXETINE 30 MG: 30 CAPSULE, DELAYED RELEASE ORAL at 09:09

## 2023-04-13 RX ADMIN — BUSPIRONE HYDROCHLORIDE 10 MG: 10 TABLET ORAL at 21:09

## 2023-04-13 RX ADMIN — TRAMADOL HYDROCHLORIDE 50 MG: 50 TABLET ORAL at 21:15

## 2023-04-13 RX ADMIN — QUETIAPINE FUMARATE 200 MG: 100 TABLET ORAL at 21:09

## 2023-04-13 RX ADMIN — BUSPIRONE HYDROCHLORIDE 10 MG: 10 TABLET ORAL at 09:09

## 2023-04-13 RX ADMIN — TRAMADOL HYDROCHLORIDE 50 MG: 50 TABLET ORAL at 09:10

## 2023-04-13 RX ADMIN — PANTOPRAZOLE SODIUM 40 MG: 40 TABLET, DELAYED RELEASE ORAL at 17:13

## 2023-04-13 RX ADMIN — ACETAMINOPHEN 650 MG: 325 TABLET ORAL at 04:44

## 2023-04-13 RX ADMIN — Medication 400 MG: at 09:09

## 2023-04-13 RX ADMIN — BUSPIRONE HYDROCHLORIDE 10 MG: 10 TABLET ORAL at 15:49

## 2023-04-13 RX ADMIN — QUETIAPINE FUMARATE 50 MG: 100 TABLET ORAL at 09:09

## 2023-04-13 RX ADMIN — GUAIFENESIN 1200 MG: 600 TABLET, EXTENDED RELEASE ORAL at 21:09

## 2023-04-13 RX ADMIN — BISACODYL 10 MG: 5 TABLET, COATED ORAL at 14:00

## 2023-04-13 RX ADMIN — POTASSIUM CHLORIDE 10 MEQ: 7.46 INJECTION, SOLUTION INTRAVENOUS at 00:19

## 2023-04-13 RX ADMIN — TRAMADOL HYDROCHLORIDE 50 MG: 50 TABLET ORAL at 15:49

## 2023-04-13 RX ADMIN — POTASSIUM CHLORIDE 40 MEQ: 750 TABLET, EXTENDED RELEASE ORAL at 10:49

## 2023-04-13 RX ADMIN — SODIUM CHLORIDE 75 ML/HR: 9 INJECTION, SOLUTION INTRAVENOUS at 14:01

## 2023-04-13 RX ADMIN — BISACODYL 10 MG: 5 TABLET, COATED ORAL at 15:49

## 2023-04-13 RX ADMIN — OXYCODONE HYDROCHLORIDE AND ACETAMINOPHEN 250 MG: 500 TABLET ORAL at 09:09

## 2023-04-13 RX ADMIN — METOPROLOL TARTRATE 25 MG: 25 TABLET, FILM COATED ORAL at 17:12

## 2023-04-13 RX ADMIN — METOPROLOL TARTRATE 25 MG: 25 TABLET, FILM COATED ORAL at 09:10

## 2023-04-13 RX ADMIN — PANTOPRAZOLE SODIUM 40 MG: 40 TABLET, DELAYED RELEASE ORAL at 09:09

## 2023-04-13 RX ADMIN — FOLIC ACID 1 MG: 1 TABLET ORAL at 09:09

## 2023-04-13 RX ADMIN — GUAIFENESIN 1200 MG: 600 TABLET, EXTENDED RELEASE ORAL at 09:09

## 2023-04-13 RX ADMIN — POLYETHYLENE GLYCOL-3350 AND ELECTROLYTES 4000 ML: 236; 6.74; 5.86; 2.97; 22.74 POWDER, FOR SOLUTION ORAL at 15:49

## 2023-04-13 RX ADMIN — ROSUVASTATIN CALCIUM 40 MG: 40 TABLET, FILM COATED ORAL at 21:09

## 2023-04-14 LAB
ANION GAP SERPL CALC-SCNC: 5 MMOL/L (ref 5–15)
BASOPHILS # BLD: 0 K/UL (ref 0–0.1)
BASOPHILS NFR BLD: 0 % (ref 0–1)
BUN SERPL-MCNC: 12 MG/DL (ref 6–20)
BUN/CREAT SERPL: 8 (ref 12–20)
CALCIUM SERPL-MCNC: 8 MG/DL (ref 8.5–10.1)
CHLORIDE SERPL-SCNC: 107 MMOL/L (ref 97–108)
CO2 SERPL-SCNC: 23 MMOL/L (ref 21–32)
CREAT SERPL-MCNC: 1.45 MG/DL (ref 0.55–1.02)
DIFFERENTIAL METHOD BLD: ABNORMAL
EOSINOPHIL # BLD: 0.1 K/UL (ref 0–0.4)
EOSINOPHIL NFR BLD: 1 % (ref 0–7)
ERYTHROCYTE [DISTWIDTH] IN BLOOD BY AUTOMATED COUNT: 15.1 % (ref 11.5–14.5)
FOLATE SERPL-MCNC: 5.9 NG/ML (ref 5–21)
GLUCOSE BLD STRIP.AUTO-MCNC: 109 MG/DL (ref 65–117)
GLUCOSE BLD STRIP.AUTO-MCNC: 66 MG/DL (ref 65–117)
GLUCOSE BLD STRIP.AUTO-MCNC: 79 MG/DL (ref 65–117)
GLUCOSE BLD STRIP.AUTO-MCNC: 89 MG/DL (ref 65–117)
GLUCOSE SERPL-MCNC: 92 MG/DL (ref 65–100)
HCT VFR BLD AUTO: 29.5 % (ref 35–47)
HGB BLD-MCNC: 9.2 G/DL (ref 11.5–16)
IMM GRANULOCYTES # BLD AUTO: 0.1 K/UL (ref 0–0.04)
IMM GRANULOCYTES NFR BLD AUTO: 1 % (ref 0–0.5)
LYMPHOCYTES # BLD: 1.7 K/UL (ref 0.8–3.5)
LYMPHOCYTES NFR BLD: 22 % (ref 12–49)
MAGNESIUM SERPL-MCNC: 1.9 MG/DL (ref 1.6–2.4)
MCH RBC QN AUTO: 34.6 PG (ref 26–34)
MCHC RBC AUTO-ENTMCNC: 31.2 G/DL (ref 30–36.5)
MCV RBC AUTO: 110.9 FL (ref 80–99)
MONOCYTES # BLD: 0.6 K/UL (ref 0–1)
MONOCYTES NFR BLD: 8 % (ref 5–13)
NEUTS SEG # BLD: 5.4 K/UL (ref 1.8–8)
NEUTS SEG NFR BLD: 68 % (ref 32–75)
NRBC # BLD: 0 K/UL (ref 0–0.01)
NRBC BLD-RTO: 0 PER 100 WBC
PHOSPHATE SERPL-MCNC: 2.2 MG/DL (ref 2.6–4.7)
PLATELET # BLD AUTO: 95 K/UL (ref 150–400)
PMV BLD AUTO: 10.5 FL (ref 8.9–12.9)
POTASSIUM SERPL-SCNC: 3.4 MMOL/L (ref 3.5–5.1)
RBC # BLD AUTO: 2.66 M/UL (ref 3.8–5.2)
RBC MORPH BLD: ABNORMAL
SERVICE CMNT-IMP: NORMAL
SODIUM SERPL-SCNC: 135 MMOL/L (ref 136–145)
VIT B12 SERPL-MCNC: 1024 PG/ML (ref 193–986)
WBC # BLD AUTO: 7.9 K/UL (ref 3.6–11)

## 2023-04-14 PROCEDURE — 74011000250 HC RX REV CODE- 250: Performed by: INTERNAL MEDICINE

## 2023-04-14 PROCEDURE — 82962 GLUCOSE BLOOD TEST: CPT

## 2023-04-14 PROCEDURE — 97530 THERAPEUTIC ACTIVITIES: CPT | Performed by: PHYSICAL THERAPIST

## 2023-04-14 PROCEDURE — 0DJD8ZZ INSPECTION OF LOWER INTESTINAL TRACT, VIA NATURAL OR ARTIFICIAL OPENING ENDOSCOPIC: ICD-10-PCS | Performed by: INTERNAL MEDICINE

## 2023-04-14 PROCEDURE — 0DB38ZX EXCISION OF LOWER ESOPHAGUS, VIA NATURAL OR ARTIFICIAL OPENING ENDOSCOPIC, DIAGNOSTIC: ICD-10-PCS | Performed by: INTERNAL MEDICINE

## 2023-04-14 PROCEDURE — 74011250637 HC RX REV CODE- 250/637: Performed by: STUDENT IN AN ORGANIZED HEALTH CARE EDUCATION/TRAINING PROGRAM

## 2023-04-14 PROCEDURE — 0DB68ZX EXCISION OF STOMACH, VIA NATURAL OR ARTIFICIAL OPENING ENDOSCOPIC, DIAGNOSTIC: ICD-10-PCS | Performed by: INTERNAL MEDICINE

## 2023-04-14 PROCEDURE — 88313 SPECIAL STAINS GROUP 2: CPT

## 2023-04-14 PROCEDURE — 74011250637 HC RX REV CODE- 250/637: Performed by: INTERNAL MEDICINE

## 2023-04-14 PROCEDURE — 83735 ASSAY OF MAGNESIUM: CPT

## 2023-04-14 PROCEDURE — 76040000007: Performed by: INTERNAL MEDICINE

## 2023-04-14 PROCEDURE — 2709999900 HC NON-CHARGEABLE SUPPLY: Performed by: INTERNAL MEDICINE

## 2023-04-14 PROCEDURE — 65270000029 HC RM PRIVATE

## 2023-04-14 PROCEDURE — 74011250636 HC RX REV CODE- 250/636: Performed by: INTERNAL MEDICINE

## 2023-04-14 PROCEDURE — 36415 COLL VENOUS BLD VENIPUNCTURE: CPT

## 2023-04-14 PROCEDURE — 76060000032 HC ANESTHESIA 0.5 TO 1 HR: Performed by: INTERNAL MEDICINE

## 2023-04-14 PROCEDURE — 85025 COMPLETE CBC W/AUTO DIFF WBC: CPT

## 2023-04-14 PROCEDURE — 0DB78ZX EXCISION OF STOMACH, PYLORUS, VIA NATURAL OR ARTIFICIAL OPENING ENDOSCOPIC, DIAGNOSTIC: ICD-10-PCS | Performed by: INTERNAL MEDICINE

## 2023-04-14 PROCEDURE — 77030019988 HC FCPS ENDOSC DISP BSC -B: Performed by: INTERNAL MEDICINE

## 2023-04-14 PROCEDURE — 82607 VITAMIN B-12: CPT

## 2023-04-14 PROCEDURE — 82746 ASSAY OF FOLIC ACID SERUM: CPT

## 2023-04-14 PROCEDURE — 84100 ASSAY OF PHOSPHORUS: CPT

## 2023-04-14 PROCEDURE — 88305 TISSUE EXAM BY PATHOLOGIST: CPT

## 2023-04-14 PROCEDURE — 80048 BASIC METABOLIC PNL TOTAL CA: CPT

## 2023-04-14 RX ORDER — SODIUM CHLORIDE 9 MG/ML
75 INJECTION, SOLUTION INTRAVENOUS CONTINUOUS
Status: CANCELLED | OUTPATIENT
Start: 2023-04-14 | End: 2023-04-14

## 2023-04-14 RX ORDER — MIDODRINE HYDROCHLORIDE 10 MG/1
10 TABLET ORAL
COMMUNITY
End: 2023-04-17

## 2023-04-14 RX ORDER — MIDAZOLAM HYDROCHLORIDE 1 MG/ML
.25-5 INJECTION, SOLUTION INTRAMUSCULAR; INTRAVENOUS
Status: DISCONTINUED | OUTPATIENT
Start: 2023-04-14 | End: 2023-04-14 | Stop reason: HOSPADM

## 2023-04-14 RX ORDER — NALOXONE HYDROCHLORIDE 0.4 MG/ML
0.4 INJECTION, SOLUTION INTRAMUSCULAR; INTRAVENOUS; SUBCUTANEOUS
Status: CANCELLED | OUTPATIENT
Start: 2023-04-14 | End: 2023-04-14

## 2023-04-14 RX ORDER — EPINEPHRINE 0.1 MG/ML
1 INJECTION INTRACARDIAC; INTRAVENOUS
Status: CANCELLED | OUTPATIENT
Start: 2023-04-14 | End: 2023-04-15

## 2023-04-14 RX ORDER — SODIUM CHLORIDE 0.9 % (FLUSH) 0.9 %
5-40 SYRINGE (ML) INJECTION EVERY 8 HOURS
Status: DISCONTINUED | OUTPATIENT
Start: 2023-04-14 | End: 2023-04-17 | Stop reason: HOSPADM

## 2023-04-14 RX ORDER — EPINEPHRINE 0.1 MG/ML
1 INJECTION INTRACARDIAC; INTRAVENOUS
Status: DISCONTINUED | OUTPATIENT
Start: 2023-04-14 | End: 2023-04-14 | Stop reason: HOSPADM

## 2023-04-14 RX ORDER — SODIUM CHLORIDE 9 MG/ML
75 INJECTION, SOLUTION INTRAVENOUS CONTINUOUS
Status: DISPENSED | OUTPATIENT
Start: 2023-04-14 | End: 2023-04-14

## 2023-04-14 RX ORDER — FENTANYL CITRATE 50 UG/ML
25 INJECTION, SOLUTION INTRAMUSCULAR; INTRAVENOUS
Status: CANCELLED | OUTPATIENT
Start: 2023-04-14 | End: 2023-04-14

## 2023-04-14 RX ORDER — DEXTROMETHORPHAN/PSEUDOEPHED 2.5-7.5/.8
1.2 DROPS ORAL
Status: DISCONTINUED | OUTPATIENT
Start: 2023-04-14 | End: 2023-04-14 | Stop reason: HOSPADM

## 2023-04-14 RX ORDER — SODIUM CHLORIDE 0.9 % (FLUSH) 0.9 %
5-40 SYRINGE (ML) INJECTION AS NEEDED
Status: DISCONTINUED | OUTPATIENT
Start: 2023-04-14 | End: 2023-04-17 | Stop reason: HOSPADM

## 2023-04-14 RX ORDER — SODIUM CHLORIDE 0.9 % (FLUSH) 0.9 %
5-40 SYRINGE (ML) INJECTION EVERY 8 HOURS
Status: CANCELLED | OUTPATIENT
Start: 2023-04-14

## 2023-04-14 RX ORDER — BALSAM PERU/CASTOR OIL
OINTMENT (GRAM) TOPICAL 2 TIMES DAILY
Status: DISCONTINUED | OUTPATIENT
Start: 2023-04-14 | End: 2023-04-17 | Stop reason: HOSPADM

## 2023-04-14 RX ORDER — FENTANYL CITRATE 50 UG/ML
25 INJECTION, SOLUTION INTRAMUSCULAR; INTRAVENOUS
Status: DISCONTINUED | OUTPATIENT
Start: 2023-04-14 | End: 2023-04-14 | Stop reason: HOSPADM

## 2023-04-14 RX ORDER — ATROPINE SULFATE 0.1 MG/ML
0.5 INJECTION INTRAVENOUS
Status: DISCONTINUED | OUTPATIENT
Start: 2023-04-14 | End: 2023-04-14 | Stop reason: HOSPADM

## 2023-04-14 RX ORDER — NALOXONE HYDROCHLORIDE 0.4 MG/ML
0.4 INJECTION, SOLUTION INTRAMUSCULAR; INTRAVENOUS; SUBCUTANEOUS
Status: DISCONTINUED | OUTPATIENT
Start: 2023-04-14 | End: 2023-04-14 | Stop reason: HOSPADM

## 2023-04-14 RX ORDER — ATROPINE SULFATE 0.1 MG/ML
0.5 INJECTION INTRAVENOUS
Status: CANCELLED | OUTPATIENT
Start: 2023-04-14 | End: 2023-04-15

## 2023-04-14 RX ORDER — FLUMAZENIL 0.1 MG/ML
0.2 INJECTION INTRAVENOUS
Status: DISCONTINUED | OUTPATIENT
Start: 2023-04-14 | End: 2023-04-14 | Stop reason: HOSPADM

## 2023-04-14 RX ORDER — DEXTROMETHORPHAN/PSEUDOEPHED 2.5-7.5/.8
1.2 DROPS ORAL
Status: CANCELLED | OUTPATIENT
Start: 2023-04-14

## 2023-04-14 RX ORDER — FLUMAZENIL 0.1 MG/ML
0.2 INJECTION INTRAVENOUS
Status: CANCELLED | OUTPATIENT
Start: 2023-04-14 | End: 2023-04-14

## 2023-04-14 RX ORDER — MIDAZOLAM HYDROCHLORIDE 1 MG/ML
.25-5 INJECTION, SOLUTION INTRAMUSCULAR; INTRAVENOUS
Status: CANCELLED | OUTPATIENT
Start: 2023-04-14 | End: 2023-04-14

## 2023-04-14 RX ORDER — SODIUM CHLORIDE 0.9 % (FLUSH) 0.9 %
5-40 SYRINGE (ML) INJECTION AS NEEDED
Status: CANCELLED | OUTPATIENT
Start: 2023-04-14

## 2023-04-14 RX ADMIN — BUSPIRONE HYDROCHLORIDE 10 MG: 10 TABLET ORAL at 21:01

## 2023-04-14 RX ADMIN — METOPROLOL TARTRATE 25 MG: 25 TABLET, FILM COATED ORAL at 08:40

## 2023-04-14 RX ADMIN — SODIUM CHLORIDE, PRESERVATIVE FREE 10 ML: 5 INJECTION INTRAVENOUS at 21:04

## 2023-04-14 RX ADMIN — GUAIFENESIN 1200 MG: 600 TABLET, EXTENDED RELEASE ORAL at 20:59

## 2023-04-14 RX ADMIN — PANTOPRAZOLE SODIUM 40 MG: 40 TABLET, DELAYED RELEASE ORAL at 17:29

## 2023-04-14 RX ADMIN — CASTOR OIL AND BALSAM, PERU: 788; 87 OINTMENT TOPICAL at 22:04

## 2023-04-14 RX ADMIN — BUSPIRONE HYDROCHLORIDE 10 MG: 10 TABLET ORAL at 17:28

## 2023-04-14 RX ADMIN — TRAMADOL HYDROCHLORIDE 50 MG: 50 TABLET ORAL at 03:43

## 2023-04-14 RX ADMIN — MELATONIN 3 MG: at 21:01

## 2023-04-14 RX ADMIN — DULOXETINE 30 MG: 30 CAPSULE, DELAYED RELEASE ORAL at 08:40

## 2023-04-14 RX ADMIN — ACETAMINOPHEN 650 MG: 325 TABLET ORAL at 20:58

## 2023-04-14 RX ADMIN — ACETAMINOPHEN 650 MG: 325 TABLET ORAL at 08:49

## 2023-04-14 RX ADMIN — SODIUM CHLORIDE, PRESERVATIVE FREE 10 ML: 5 INJECTION INTRAVENOUS at 14:19

## 2023-04-14 RX ADMIN — ROSUVASTATIN CALCIUM 40 MG: 40 TABLET, FILM COATED ORAL at 21:01

## 2023-04-14 RX ADMIN — TRAMADOL HYDROCHLORIDE 50 MG: 50 TABLET ORAL at 16:16

## 2023-04-14 RX ADMIN — METOPROLOL TARTRATE 25 MG: 25 TABLET, FILM COATED ORAL at 17:29

## 2023-04-14 RX ADMIN — SODIUM CHLORIDE 75 ML/HR: 9 INJECTION, SOLUTION INTRAVENOUS at 03:56

## 2023-04-14 RX ADMIN — QUETIAPINE FUMARATE 50 MG: 100 TABLET ORAL at 08:40

## 2023-04-14 RX ADMIN — BUSPIRONE HYDROCHLORIDE 10 MG: 10 TABLET ORAL at 08:40

## 2023-04-14 RX ADMIN — FERROUS SULFATE TAB 325 MG (65 MG ELEMENTAL FE) 325 MG: 325 (65 FE) TAB at 08:40

## 2023-04-14 RX ADMIN — SODIUM CHLORIDE 75 ML/HR: 9 INJECTION, SOLUTION INTRAVENOUS at 12:10

## 2023-04-14 RX ADMIN — PANTOPRAZOLE SODIUM 40 MG: 40 TABLET, DELAYED RELEASE ORAL at 08:40

## 2023-04-14 RX ADMIN — DEXTROSE MONOHYDRATE 125 ML: 100 INJECTION, SOLUTION INTRAVENOUS at 08:42

## 2023-04-14 RX ADMIN — QUETIAPINE FUMARATE 200 MG: 100 TABLET ORAL at 21:00

## 2023-04-15 LAB
ANION GAP SERPL CALC-SCNC: 5 MMOL/L (ref 5–15)
BASOPHILS # BLD: 0 K/UL (ref 0–0.1)
BASOPHILS NFR BLD: 0 % (ref 0–1)
BUN SERPL-MCNC: 11 MG/DL (ref 6–20)
BUN/CREAT SERPL: 8 (ref 12–20)
CALCIUM SERPL-MCNC: 7.8 MG/DL (ref 8.5–10.1)
CHLORIDE SERPL-SCNC: 111 MMOL/L (ref 97–108)
CO2 SERPL-SCNC: 24 MMOL/L (ref 21–32)
CREAT SERPL-MCNC: 1.38 MG/DL (ref 0.55–1.02)
DIFFERENTIAL METHOD BLD: ABNORMAL
EOSINOPHIL # BLD: 0.1 K/UL (ref 0–0.4)
EOSINOPHIL NFR BLD: 1 % (ref 0–7)
ERYTHROCYTE [DISTWIDTH] IN BLOOD BY AUTOMATED COUNT: 14.9 % (ref 11.5–14.5)
GLUCOSE BLD STRIP.AUTO-MCNC: 133 MG/DL (ref 65–117)
GLUCOSE BLD STRIP.AUTO-MCNC: 134 MG/DL (ref 65–117)
GLUCOSE BLD STRIP.AUTO-MCNC: 149 MG/DL (ref 65–117)
GLUCOSE BLD STRIP.AUTO-MCNC: 66 MG/DL (ref 65–117)
GLUCOSE BLD STRIP.AUTO-MCNC: 96 MG/DL (ref 65–117)
GLUCOSE SERPL-MCNC: 82 MG/DL (ref 65–100)
HCT VFR BLD AUTO: 27.8 % (ref 35–47)
HGB BLD-MCNC: 8.8 G/DL (ref 11.5–16)
IMM GRANULOCYTES # BLD AUTO: 0.2 K/UL (ref 0–0.04)
IMM GRANULOCYTES NFR BLD AUTO: 2 % (ref 0–0.5)
LYMPHOCYTES # BLD: 1.8 K/UL (ref 0.8–3.5)
LYMPHOCYTES NFR BLD: 24 % (ref 12–49)
MAGNESIUM SERPL-MCNC: 1.7 MG/DL (ref 1.6–2.4)
MCH RBC QN AUTO: 34.8 PG (ref 26–34)
MCHC RBC AUTO-ENTMCNC: 31.7 G/DL (ref 30–36.5)
MCV RBC AUTO: 109.9 FL (ref 80–99)
MONOCYTES # BLD: 0.6 K/UL (ref 0–1)
MONOCYTES NFR BLD: 8 % (ref 5–13)
NEUTS SEG # BLD: 5 K/UL (ref 1.8–8)
NEUTS SEG NFR BLD: 65 % (ref 32–75)
NRBC # BLD: 0 K/UL (ref 0–0.01)
NRBC BLD-RTO: 0 PER 100 WBC
PHOSPHATE SERPL-MCNC: 2.5 MG/DL (ref 2.6–4.7)
PLATELET # BLD AUTO: 107 K/UL (ref 150–400)
PMV BLD AUTO: 10.3 FL (ref 8.9–12.9)
POTASSIUM SERPL-SCNC: 3.5 MMOL/L (ref 3.5–5.1)
RBC # BLD AUTO: 2.53 M/UL (ref 3.8–5.2)
RBC MORPH BLD: ABNORMAL
RBC MORPH BLD: ABNORMAL
SERVICE CMNT-IMP: ABNORMAL
SERVICE CMNT-IMP: NORMAL
SERVICE CMNT-IMP: NORMAL
SODIUM SERPL-SCNC: 140 MMOL/L (ref 136–145)
WBC # BLD AUTO: 7.7 K/UL (ref 3.6–11)

## 2023-04-15 PROCEDURE — 82962 GLUCOSE BLOOD TEST: CPT

## 2023-04-15 PROCEDURE — 80048 BASIC METABOLIC PNL TOTAL CA: CPT

## 2023-04-15 PROCEDURE — 83735 ASSAY OF MAGNESIUM: CPT

## 2023-04-15 PROCEDURE — 97116 GAIT TRAINING THERAPY: CPT | Performed by: PHYSICAL THERAPIST

## 2023-04-15 PROCEDURE — 74011000250 HC RX REV CODE- 250: Performed by: INTERNAL MEDICINE

## 2023-04-15 PROCEDURE — 74011636637 HC RX REV CODE- 636/637: Performed by: INTERNAL MEDICINE

## 2023-04-15 PROCEDURE — 74011250637 HC RX REV CODE- 250/637: Performed by: STUDENT IN AN ORGANIZED HEALTH CARE EDUCATION/TRAINING PROGRAM

## 2023-04-15 PROCEDURE — 36415 COLL VENOUS BLD VENIPUNCTURE: CPT

## 2023-04-15 PROCEDURE — 85025 COMPLETE CBC W/AUTO DIFF WBC: CPT

## 2023-04-15 PROCEDURE — 74011250636 HC RX REV CODE- 250/636: Performed by: INTERNAL MEDICINE

## 2023-04-15 PROCEDURE — 84100 ASSAY OF PHOSPHORUS: CPT

## 2023-04-15 PROCEDURE — 65270000029 HC RM PRIVATE

## 2023-04-15 PROCEDURE — 74011250636 HC RX REV CODE- 250/636: Performed by: STUDENT IN AN ORGANIZED HEALTH CARE EDUCATION/TRAINING PROGRAM

## 2023-04-15 PROCEDURE — 97530 THERAPEUTIC ACTIVITIES: CPT | Performed by: PHYSICAL THERAPIST

## 2023-04-15 PROCEDURE — 74011250637 HC RX REV CODE- 250/637: Performed by: INTERNAL MEDICINE

## 2023-04-15 RX ORDER — MIDODRINE HYDROCHLORIDE 5 MG/1
10 TABLET ORAL
Status: DISCONTINUED | OUTPATIENT
Start: 2023-04-15 | End: 2023-04-16

## 2023-04-15 RX ADMIN — TRAMADOL HYDROCHLORIDE 50 MG: 50 TABLET ORAL at 12:20

## 2023-04-15 RX ADMIN — BUSPIRONE HYDROCHLORIDE 10 MG: 10 TABLET ORAL at 08:27

## 2023-04-15 RX ADMIN — PANTOPRAZOLE SODIUM 40 MG: 40 TABLET, DELAYED RELEASE ORAL at 17:27

## 2023-04-15 RX ADMIN — SODIUM CHLORIDE, PRESERVATIVE FREE 10 ML: 5 INJECTION INTRAVENOUS at 06:28

## 2023-04-15 RX ADMIN — CASTOR OIL AND BALSAM, PERU: 788; 87 OINTMENT TOPICAL at 20:27

## 2023-04-15 RX ADMIN — SODIUM CHLORIDE 75 ML/HR: 9 INJECTION, SOLUTION INTRAVENOUS at 17:31

## 2023-04-15 RX ADMIN — PANTOPRAZOLE SODIUM 40 MG: 40 TABLET, DELAYED RELEASE ORAL at 08:27

## 2023-04-15 RX ADMIN — FERROUS SULFATE TAB 325 MG (65 MG ELEMENTAL FE) 325 MG: 325 (65 FE) TAB at 06:36

## 2023-04-15 RX ADMIN — BUSPIRONE HYDROCHLORIDE 10 MG: 10 TABLET ORAL at 21:04

## 2023-04-15 RX ADMIN — QUETIAPINE FUMARATE 50 MG: 100 TABLET ORAL at 08:27

## 2023-04-15 RX ADMIN — FOLIC ACID 1 MG: 1 TABLET ORAL at 08:27

## 2023-04-15 RX ADMIN — GUAIFENESIN 1200 MG: 600 TABLET, EXTENDED RELEASE ORAL at 08:27

## 2023-04-15 RX ADMIN — SODIUM CHLORIDE 75 ML/HR: 9 INJECTION, SOLUTION INTRAVENOUS at 01:25

## 2023-04-15 RX ADMIN — ONDANSETRON 4 MG: 2 INJECTION INTRAMUSCULAR; INTRAVENOUS at 14:27

## 2023-04-15 RX ADMIN — OXYCODONE HYDROCHLORIDE AND ACETAMINOPHEN 250 MG: 500 TABLET ORAL at 08:27

## 2023-04-15 RX ADMIN — GUAIFENESIN 1200 MG: 600 TABLET, EXTENDED RELEASE ORAL at 20:26

## 2023-04-15 RX ADMIN — HYDROXYZINE HYDROCHLORIDE 25 MG: 10 TABLET ORAL at 01:36

## 2023-04-15 RX ADMIN — CASTOR OIL AND BALSAM, PERU: 788; 87 OINTMENT TOPICAL at 08:28

## 2023-04-15 RX ADMIN — QUETIAPINE FUMARATE 200 MG: 100 TABLET ORAL at 21:04

## 2023-04-15 RX ADMIN — Medication 2 UNITS: at 17:27

## 2023-04-15 RX ADMIN — Medication 400 MG: at 08:27

## 2023-04-15 RX ADMIN — HYDROXYZINE HYDROCHLORIDE 25 MG: 10 TABLET ORAL at 20:23

## 2023-04-15 RX ADMIN — BUSPIRONE HYDROCHLORIDE 10 MG: 10 TABLET ORAL at 17:27

## 2023-04-15 RX ADMIN — DULOXETINE 30 MG: 30 CAPSULE, DELAYED RELEASE ORAL at 08:27

## 2023-04-15 RX ADMIN — SODIUM CHLORIDE, PRESERVATIVE FREE 10 ML: 5 INJECTION INTRAVENOUS at 21:04

## 2023-04-15 RX ADMIN — METOPROLOL TARTRATE 25 MG: 25 TABLET, FILM COATED ORAL at 08:27

## 2023-04-15 RX ADMIN — METOPROLOL TARTRATE 25 MG: 25 TABLET, FILM COATED ORAL at 17:26

## 2023-04-15 RX ADMIN — ROSUVASTATIN CALCIUM 40 MG: 40 TABLET, FILM COATED ORAL at 21:04

## 2023-04-15 RX ADMIN — SODIUM CHLORIDE, PRESERVATIVE FREE 10 ML: 5 INJECTION INTRAVENOUS at 14:27

## 2023-04-16 LAB
ANION GAP SERPL CALC-SCNC: 4 MMOL/L (ref 5–15)
BASOPHILS # BLD: 0 K/UL (ref 0–0.1)
BASOPHILS NFR BLD: 0 % (ref 0–1)
BUN SERPL-MCNC: 12 MG/DL (ref 6–20)
BUN/CREAT SERPL: 10 (ref 12–20)
CALCIUM SERPL-MCNC: 7.6 MG/DL (ref 8.5–10.1)
CHLORIDE SERPL-SCNC: 110 MMOL/L (ref 97–108)
CO2 SERPL-SCNC: 24 MMOL/L (ref 21–32)
CREAT SERPL-MCNC: 1.25 MG/DL (ref 0.55–1.02)
DIFFERENTIAL METHOD BLD: ABNORMAL
EOSINOPHIL # BLD: 0.1 K/UL (ref 0–0.4)
EOSINOPHIL NFR BLD: 1 % (ref 0–7)
ERYTHROCYTE [DISTWIDTH] IN BLOOD BY AUTOMATED COUNT: 15.1 % (ref 11.5–14.5)
GLUCOSE BLD STRIP.AUTO-MCNC: 112 MG/DL (ref 65–117)
GLUCOSE BLD STRIP.AUTO-MCNC: 127 MG/DL (ref 65–117)
GLUCOSE BLD STRIP.AUTO-MCNC: 132 MG/DL (ref 65–117)
GLUCOSE BLD STRIP.AUTO-MCNC: 92 MG/DL (ref 65–117)
GLUCOSE SERPL-MCNC: 126 MG/DL (ref 65–100)
HCT VFR BLD AUTO: 25.5 % (ref 35–47)
HGB BLD-MCNC: 7.9 G/DL (ref 11.5–16)
IMM GRANULOCYTES # BLD AUTO: 0.2 K/UL (ref 0–0.04)
IMM GRANULOCYTES NFR BLD AUTO: 2 % (ref 0–0.5)
LYMPHOCYTES # BLD: 1.8 K/UL (ref 0.8–3.5)
LYMPHOCYTES NFR BLD: 20 % (ref 12–49)
MAGNESIUM SERPL-MCNC: 1.6 MG/DL (ref 1.6–2.4)
MCH RBC QN AUTO: 34.3 PG (ref 26–34)
MCHC RBC AUTO-ENTMCNC: 31 G/DL (ref 30–36.5)
MCV RBC AUTO: 110.9 FL (ref 80–99)
MONOCYTES # BLD: 0.6 K/UL (ref 0–1)
MONOCYTES NFR BLD: 7 % (ref 5–13)
NEUTS SEG # BLD: 6.1 K/UL (ref 1.8–8)
NEUTS SEG NFR BLD: 70 % (ref 32–75)
NRBC # BLD: 0 K/UL (ref 0–0.01)
NRBC BLD-RTO: 0 PER 100 WBC
PHOSPHATE SERPL-MCNC: 2.2 MG/DL (ref 2.6–4.7)
PLATELET # BLD AUTO: 113 K/UL (ref 150–400)
PMV BLD AUTO: 10.4 FL (ref 8.9–12.9)
POTASSIUM SERPL-SCNC: 3.4 MMOL/L (ref 3.5–5.1)
RBC # BLD AUTO: 2.3 M/UL (ref 3.8–5.2)
RBC MORPH BLD: ABNORMAL
RBC MORPH BLD: ABNORMAL
SERVICE CMNT-IMP: ABNORMAL
SERVICE CMNT-IMP: ABNORMAL
SERVICE CMNT-IMP: NORMAL
SERVICE CMNT-IMP: NORMAL
SODIUM SERPL-SCNC: 138 MMOL/L (ref 136–145)
WBC # BLD AUTO: 8.8 K/UL (ref 3.6–11)

## 2023-04-16 PROCEDURE — 84100 ASSAY OF PHOSPHORUS: CPT

## 2023-04-16 PROCEDURE — 36415 COLL VENOUS BLD VENIPUNCTURE: CPT

## 2023-04-16 PROCEDURE — 65270000029 HC RM PRIVATE

## 2023-04-16 PROCEDURE — 74011250636 HC RX REV CODE- 250/636: Performed by: INTERNAL MEDICINE

## 2023-04-16 PROCEDURE — 74011250637 HC RX REV CODE- 250/637: Performed by: INTERNAL MEDICINE

## 2023-04-16 PROCEDURE — 80048 BASIC METABOLIC PNL TOTAL CA: CPT

## 2023-04-16 PROCEDURE — 74011000250 HC RX REV CODE- 250: Performed by: INTERNAL MEDICINE

## 2023-04-16 PROCEDURE — 74011250637 HC RX REV CODE- 250/637: Performed by: STUDENT IN AN ORGANIZED HEALTH CARE EDUCATION/TRAINING PROGRAM

## 2023-04-16 PROCEDURE — 82962 GLUCOSE BLOOD TEST: CPT

## 2023-04-16 PROCEDURE — 85025 COMPLETE CBC W/AUTO DIFF WBC: CPT

## 2023-04-16 PROCEDURE — 83735 ASSAY OF MAGNESIUM: CPT

## 2023-04-16 RX ORDER — MAGNESIUM SULFATE HEPTAHYDRATE 40 MG/ML
2 INJECTION, SOLUTION INTRAVENOUS ONCE
Status: COMPLETED | OUTPATIENT
Start: 2023-04-16 | End: 2023-04-17

## 2023-04-16 RX ORDER — MIDODRINE HYDROCHLORIDE 5 MG/1
10 TABLET ORAL
Status: DISCONTINUED | OUTPATIENT
Start: 2023-04-16 | End: 2023-04-16

## 2023-04-16 RX ORDER — MIDODRINE HYDROCHLORIDE 5 MG/1
5 TABLET ORAL
Status: DISCONTINUED | OUTPATIENT
Start: 2023-04-16 | End: 2023-04-17 | Stop reason: HOSPADM

## 2023-04-16 RX ADMIN — BUSPIRONE HYDROCHLORIDE 10 MG: 10 TABLET ORAL at 09:45

## 2023-04-16 RX ADMIN — BUSPIRONE HYDROCHLORIDE 10 MG: 10 TABLET ORAL at 17:17

## 2023-04-16 RX ADMIN — Medication 400 MG: at 09:45

## 2023-04-16 RX ADMIN — BUSPIRONE HYDROCHLORIDE 10 MG: 10 TABLET ORAL at 21:01

## 2023-04-16 RX ADMIN — TRAMADOL HYDROCHLORIDE 50 MG: 50 TABLET ORAL at 06:27

## 2023-04-16 RX ADMIN — QUETIAPINE FUMARATE 200 MG: 100 TABLET ORAL at 21:01

## 2023-04-16 RX ADMIN — QUETIAPINE FUMARATE 50 MG: 100 TABLET ORAL at 09:45

## 2023-04-16 RX ADMIN — SODIUM CHLORIDE 75 ML/HR: 9 INJECTION, SOLUTION INTRAVENOUS at 06:20

## 2023-04-16 RX ADMIN — PANTOPRAZOLE SODIUM 40 MG: 40 TABLET, DELAYED RELEASE ORAL at 09:45

## 2023-04-16 RX ADMIN — TRAMADOL HYDROCHLORIDE 50 MG: 50 TABLET ORAL at 13:10

## 2023-04-16 RX ADMIN — MAGNESIUM SULFATE HEPTAHYDRATE 2 G: 40 INJECTION, SOLUTION INTRAVENOUS at 10:54

## 2023-04-16 RX ADMIN — DULOXETINE 30 MG: 30 CAPSULE, DELAYED RELEASE ORAL at 09:45

## 2023-04-16 RX ADMIN — METOPROLOL TARTRATE 25 MG: 25 TABLET, FILM COATED ORAL at 09:45

## 2023-04-16 RX ADMIN — OXYCODONE HYDROCHLORIDE AND ACETAMINOPHEN 250 MG: 500 TABLET ORAL at 09:45

## 2023-04-16 RX ADMIN — MIDODRINE HYDROCHLORIDE 5 MG: 5 TABLET ORAL at 12:59

## 2023-04-16 RX ADMIN — HYDROXYZINE HYDROCHLORIDE 25 MG: 10 TABLET ORAL at 20:53

## 2023-04-16 RX ADMIN — PANTOPRAZOLE SODIUM 40 MG: 40 TABLET, DELAYED RELEASE ORAL at 17:17

## 2023-04-16 RX ADMIN — GUAIFENESIN 1200 MG: 600 TABLET, EXTENDED RELEASE ORAL at 09:45

## 2023-04-16 RX ADMIN — ROSUVASTATIN CALCIUM 40 MG: 40 TABLET, FILM COATED ORAL at 21:01

## 2023-04-16 RX ADMIN — POTASSIUM PHOSPHATE, MONOBASIC AND POTASSIUM PHOSPHATE, DIBASIC: 224; 236 INJECTION, SOLUTION, CONCENTRATE INTRAVENOUS at 10:55

## 2023-04-16 RX ADMIN — FERROUS SULFATE TAB 325 MG (65 MG ELEMENTAL FE) 325 MG: 325 (65 FE) TAB at 06:30

## 2023-04-16 RX ADMIN — SODIUM CHLORIDE, PRESERVATIVE FREE 10 ML: 5 INJECTION INTRAVENOUS at 21:01

## 2023-04-16 RX ADMIN — SODIUM CHLORIDE, PRESERVATIVE FREE 10 ML: 5 INJECTION INTRAVENOUS at 06:21

## 2023-04-16 RX ADMIN — GUAIFENESIN 1200 MG: 600 TABLET, EXTENDED RELEASE ORAL at 20:52

## 2023-04-16 RX ADMIN — METOPROLOL TARTRATE 25 MG: 25 TABLET, FILM COATED ORAL at 17:17

## 2023-04-16 RX ADMIN — FOLIC ACID 1 MG: 1 TABLET ORAL at 09:45

## 2023-04-16 RX ADMIN — CASTOR OIL AND BALSAM, PERU: 788; 87 OINTMENT TOPICAL at 20:36

## 2023-04-16 RX ADMIN — SODIUM CHLORIDE 75 ML/HR: 9 INJECTION, SOLUTION INTRAVENOUS at 20:36

## 2023-04-16 RX ADMIN — CASTOR OIL AND BALSAM, PERU: 788; 87 OINTMENT TOPICAL at 09:45

## 2023-04-17 VITALS
HEIGHT: 67 IN | RESPIRATION RATE: 18 BRPM | OXYGEN SATURATION: 96 % | DIASTOLIC BLOOD PRESSURE: 79 MMHG | WEIGHT: 200 LBS | SYSTOLIC BLOOD PRESSURE: 116 MMHG | BODY MASS INDEX: 31.39 KG/M2 | HEART RATE: 83 BPM | TEMPERATURE: 98.1 F

## 2023-04-17 LAB
GLUCOSE BLD STRIP.AUTO-MCNC: 113 MG/DL (ref 65–117)
GLUCOSE BLD STRIP.AUTO-MCNC: 84 MG/DL (ref 65–117)
SERVICE CMNT-IMP: NORMAL
SERVICE CMNT-IMP: NORMAL

## 2023-04-17 PROCEDURE — 82962 GLUCOSE BLOOD TEST: CPT

## 2023-04-17 PROCEDURE — 97535 SELF CARE MNGMENT TRAINING: CPT

## 2023-04-17 PROCEDURE — 74011250637 HC RX REV CODE- 250/637: Performed by: STUDENT IN AN ORGANIZED HEALTH CARE EDUCATION/TRAINING PROGRAM

## 2023-04-17 PROCEDURE — 74011250637 HC RX REV CODE- 250/637: Performed by: INTERNAL MEDICINE

## 2023-04-17 PROCEDURE — 74011000250 HC RX REV CODE- 250: Performed by: INTERNAL MEDICINE

## 2023-04-17 RX ORDER — MIDODRINE HYDROCHLORIDE 5 MG/1
5 TABLET ORAL
Qty: 90 TABLET | Refills: 0 | Status: SHIPPED
Start: 2023-04-17 | End: 2023-05-17

## 2023-04-17 RX ADMIN — METOPROLOL TARTRATE 25 MG: 25 TABLET, FILM COATED ORAL at 09:19

## 2023-04-17 RX ADMIN — OXYCODONE HYDROCHLORIDE AND ACETAMINOPHEN 250 MG: 500 TABLET ORAL at 09:17

## 2023-04-17 RX ADMIN — BUSPIRONE HYDROCHLORIDE 10 MG: 10 TABLET ORAL at 09:18

## 2023-04-17 RX ADMIN — Medication 400 MG: at 09:18

## 2023-04-17 RX ADMIN — MIDODRINE HYDROCHLORIDE 5 MG: 5 TABLET ORAL at 12:00

## 2023-04-17 RX ADMIN — TRAMADOL HYDROCHLORIDE 50 MG: 50 TABLET ORAL at 14:39

## 2023-04-17 RX ADMIN — QUETIAPINE FUMARATE 50 MG: 100 TABLET ORAL at 09:18

## 2023-04-17 RX ADMIN — PANTOPRAZOLE SODIUM 40 MG: 40 TABLET, DELAYED RELEASE ORAL at 09:19

## 2023-04-17 RX ADMIN — SODIUM CHLORIDE, PRESERVATIVE FREE 10 ML: 5 INJECTION INTRAVENOUS at 06:35

## 2023-04-17 RX ADMIN — GUAIFENESIN 1200 MG: 600 TABLET, EXTENDED RELEASE ORAL at 09:17

## 2023-04-17 RX ADMIN — CASTOR OIL AND BALSAM, PERU: 788; 87 OINTMENT TOPICAL at 09:22

## 2023-04-17 RX ADMIN — TRAMADOL HYDROCHLORIDE 50 MG: 50 TABLET ORAL at 05:52

## 2023-04-17 RX ADMIN — FERROUS SULFATE TAB 325 MG (65 MG ELEMENTAL FE) 325 MG: 325 (65 FE) TAB at 06:35

## 2023-04-17 RX ADMIN — SODIUM CHLORIDE, PRESERVATIVE FREE 10 ML: 5 INJECTION INTRAVENOUS at 14:00

## 2023-04-17 RX ADMIN — FOLIC ACID 1 MG: 1 TABLET ORAL at 09:18

## 2023-04-17 RX ADMIN — DULOXETINE 30 MG: 30 CAPSULE, DELAYED RELEASE ORAL at 09:17

## 2023-04-17 NOTE — PROGRESS NOTES
Patient is ready for d/c from CM standpoint    Transition of Care Plan:     RUR:17%  [id number]  If SNF or IPR: Date FOC offered:  Date FOC received:  Date authorization started with reference number:  Date authorization received and expires:  Accepting facility:  Follow up appointments:  DME needed:has a walker & BSC  [email or web address] or means to access home:        IM Medicare Letter:2nd IM given  Is patient a [place] and connected with the [place]? If yes, was [employer] transfer form completed and VA notified? Caregiver Contact:  Discharge Caregiver contacted prior to discharge? Care Conference needed?:      Patient is d/c to SNF today at 3pm.  No other needs or concerns identified. RN to call report to [phone]    Care Management Interventions  PCP Verified by CM: Yes  Mode of Transport at Discharge:  Other (see comment) (son)  Transition of Care Consult (CM Consult): SNF  Partner SNF: Yes  Discharge Durable Medical Equipment: No (has a walker & BSC)  Physical Therapy Consult: Yes  Occupational Therapy Consult: Yes  Speech Therapy Consult: No  Support Systems: Child(dion), Other Family Member(s)  Confirm Follow Up Transport: Self  The Patient and/or Patient Representative was Provided with a Choice of Provider and Agrees with the Discharge Plan?: Yes  Name of the Patient Representative Who was Provided with a Choice of Provider and Agrees with the Discharge Plan: patient  Freedom of Choice List was Provided with Basic Dialogue that Supports the Patient's Individualized Plan of Care/Goals, Treatment Preferences and Shares the Quality Data Associated with the Providers?: Yes  Discharge Location  Patient Expects to be Discharged to[de-identified] Skilled nursing facility ([facility])    [facility]  Ext [phone]

## 2023-04-17 NOTE — PROGRESS NOTES
Transition of Care Plan to SNF/Rehab    Communication to Patient/Family:  Met with patient and family and they are agreeable to the transition plan. The Plan for Transition of Care is related to the following treatment goals: The Patient and/or patient representative was provided with a choice of provider and agrees  with the discharge plan. Yes [x] No []    A Freedom of choice list was provided with basic dialogue that supports the patient's individualized plan of care/goals and shares the quality data associated with the providers. Yes [x] No []    SNF/Rehab Transition:  Patient has been accepted to [facility] SNF/Rehab and meets criteria for admission. Patient will transported by son and expected to leave at 3pm    Communication to SNF/Rehab:  Bedside RN,  has been notified to update the transition plan to the facility and call report ([phone]). Discharge information has been updated on the AVS. And communicated to facility via [employer], or CC link. Discharge instructions to be fax'd to facility at [facility] #). Nursing Please include all hard scripts for controlled substances, med rec and dc summary, and AVS in packet. Reviewed and confirmed with facility, can manage the patient care needs for the following:     Beck Colvin with (X) only those applicable:  Medication:  []Medications are available at the facility  []IV Antibiotics    []Controlled Substance - hard copies available sent.   []Weekly Labs    Equipment:  []CPAP/BiPAP  []Wound Vacuum  [x][place] or Urinary Device  []PICC/Central Line  []Nebulizer  []Ventilator    Treatment:  [x]Isolation (for MRSA, VRE, etc.)  []Surgical Drain Management  []Tracheostomy Care  []Dressing Changes  []Dialysis with transportation  []PEG Care  []Oxygen  []Daily Weights for Heart Failure ESBL, VRE   Dietary:  [x]Any diet limitations  []Tube Feedings   []Total Parenteral Management (TPN) ADULT DIET Regular; Low Fat/Low Chol/High Fiber/ENRIQUE Financial Resources:  []Medicaid Application Completed    []UAI Completed and copy given to pt/family  and copy given to pt/family  []A screening has previously been completed. []Level II Completed    [] Private pay individual who will not become   financially eligible for Medicaid within 6 months from admission to a [address] [place] [address] facility. [] Individual refused to have screening conducted. []Medicaid Application Completed    []The screening denied because it was determined individual did not need/did not qualify for nursing facility level of care. [] Out of state residents seeking direct admission to a [address] facility. [] Individuals who are inpatients of an out of state hospital, or in state or out of state veterans/ hospital and seek direct admission to a [address] facility  [] Individuals who are pateints or residents of a state owned/operated facility that is licensed by Department of [employer] (DBS) and seek direct admission to [address] [place] [address]  [] A screening not required for enrollment in [facility] services as set out in [address] [place] [address] 41-  [] [facility]) staff shall perform screenings of the [facility] clients. Advanced Care Plan:  [x]Surrogate Decision Maker of Care  []POA  []Communicated Code Status and copy sent.  Enio Koroma [phone]   Other:

## 2023-04-17 NOTE — PROGRESS NOTES
DISCHARGE NOTE FROM [facility] NURSE    Patient determined to be stable for discharge by attending provider. I have reviewed the discharge instructions and follow-up appointments with the patient and son. They verbalized understanding and all questions were answered to their satisfaction. No complaints or further questions were expressed. Medications sent to pharmacy. Appropriate educational materials and medication side effect teaching were provided. PIV were removed prior to discharge. Patient did not discharge with any line, villegas, or drain. All personal items collected during admission were returned to the patient prior to discharge.           Andrés Abdi RN

## 2023-04-17 NOTE — PROGRESS NOTES
End of Shift Note    Bedside shift change report given to Ingrid Ma RN  (oncoming nurse) by Macario Motley RN (offgoing nurse). Report included the following information SBAR, Kardex, Intake/Output, and MAR    Shift worked:  [id number]     Shift summary and any significant changes:    Pt turned q2 hour    Vital signs stable, no significant events    Pt excoriation in groin remains an issue, cleansed w/ soap and water and applied zinc cream before shift change   Concerns for physician to address: Discharge     Zone phone for oncoming shift:         Activity:  Activity Level:  Up with Assistance  Number times ambulated in hallways past shift: 0  Number of times OOB to chair past shift: 0    Cardiac:   Cardiac Monitoring: No      Cardiac Rhythm: Sinus Rhythm    Access:  Current line(s): PIV     Genitourinary:   Urinary status: voiding    Respiratory:   O2 Device: None (Room air)  Chronic home O2 use?: NO  Incentive spirometer at bedside: N/A       GI:  Last Bowel Movement Date: 04/16/23  Current diet:  ADULT DIET Regular; Low Fat/Low Chol/High Fiber/ENRIQUE  Passing flatus: YES  Tolerating current diet: YES       Pain Management:   Patient states pain is manageable on current regimen: YES    Skin:  Torrey Score: 18  Interventions: turn team, speciality bed, float heels, increase time out of bed, and PT/OT consult    Patient Safety:  Fall Score:    Interventions: assistive device (walker, cane, etc), gripper socks, and pt to call before getting OOB       Length of Stay:  Expected LOS: 2d 9h  Actual LOS: 8 Linda Nation RN

## 2023-04-17 NOTE — PROGRESS NOTES
DISCHARGE NOTE FROM [facility] NURSE    Patient determined to be stable for discharge by attending provider. I have reviewed the discharge instructions and follow-up appointments with the patient and son. They verbalized understanding and all questions were answered to their satisfaction. No complaints or further questions were expressed. Appropriate educational materials and medication side effect teaching were provided. PIV were removed prior to discharge. Patient did not discharge with any line, villegas, or drain. All personal items collected during admission were returned to the patient prior to discharge. Post-op patient: No  Report called to SNF/ Skilled nursing facility [facility]) phone number [phone] to receiving nurse Rolanda.       General Antonio RN

## 2023-04-17 NOTE — PROGRESS NOTES
Pharmacist Discharge Medication Reconciliation      Significant PMH:   Past Medical History:   Diagnosis Date    [facility]ysm ([email or web address]     Anxiety 01/28/2010    Brain aneurysm     2.5 mm    CAD (coronary artery disease)     carotid stenosis    Chronic kidney disease     CVA (cerebral vascular accident) ([email or web address] 03/2021    right hemispheric    DM (diabetes mellitus) ([email or web address] 01/28/2010    GERD (gastroesophageal reflux disease)     HTN (hypertension) 01/28/2010    Hyperlipidemia 01/28/2010    IBS (irritable bowel syndrome)     POTS (postural orthostatic tachycardia syndrome)     Psychiatric disorder     PUD (peptic ulcer disease)     Thromboembolus ([email or web address]      Encounter Diagnoses:   Encounter Diagnoses   Name Primary? Pneumonia of right lower lobe due to infectious organism Yes    Acute blood loss anemia     UGI bleed      Allergies: Gabapentin, Ace inhibitors, Shell-seltzer plus, Anexsia [hydrocodone-acetaminophen], and Pepto-bismol [bismuth subsalicylate]    Admission date: 4/12/2023     Discharge Medications:   Current Discharge Medication List        CONTINUE these medications which have CHANGED    Details   midodrine (PROAMATINE) 5 mg tablet Take 1 Tablet by mouth three (3) times daily (with meals) for 30 days. Qty: 90 Tablet, Refills: 0  Start date: 4/17/2023, End date: 5/17/2023      rivaroxaban (XARELTO) 15 mg tab tablet Take 1 Tablet by mouth daily. This replaces previously prescribed dose of 20mg oral daily  Indications: prevention of thromboembolism in paroxysmal atrial fibrillation  Qty: 30 Tablet, Refills: 5  Start date: 4/17/2023           CONTINUE these medications which have NOT CHANGED    Details   folic acid (FOLVITE) 1 mg tablet Take 1 mg by mouth daily. !! QUEtiapine (SEROquel) 50 mg tablet Take 50 mg by mouth daily. !! QUEtiapine (SEROquel) 50 mg tablet Take 150 mg by mouth nightly. ascorbic acid, vitamin C, (VITAMIN C) 250 mg tablet Take 1 Tablet by mouth daily.   Qty: 30 Tablet, Refills: 11 ferrous sulfate 325 mg (65 mg iron) tablet Take 1 Tablet by mouth Daily (before breakfast). Qty: 30 Tablet, Refills: 11      busPIRone (BUSPAR) 10 mg tablet Take 1 Tablet by mouth three (3) times daily. Qty: 90 Tablet, Refills: 11      DULoxetine (CYMBALTA) 30 mg capsule Take 1 Capsule by mouth daily. Qty: 30 Capsule, Refills: 11      metoprolol tartrate (LOPRESSOR) 25 mg tablet Take 0.5 Tablets by mouth two (2) times a day. HOLD for systolic BP < 90 or HR < 60.  Qty: 30 Tablet, Refills: 11      vitamin B complex (B COMPLEX PO) Take 1 Tablet by mouth daily. magnesium oxide (MAG-OX) 400 mg tablet Take 400 mg by mouth daily. pantoprazole (PROTONIX) 40 mg tablet Take 40 mg by mouth two (2) times a day. rosuvastatin (CRESTOR) 40 mg tablet Take 40 mg by mouth nightly. !! - Potential duplicate medications found. Please discuss with provider. The patient's chart, MAR and AVS were reviewed by Nanci Jo [facility].     Discharging Provider: Krystyna Mobley MD     Thank you,     Nanci Jo, [facility]

## 2023-04-17 NOTE — PROGRESS NOTES
Problem: Falls - Risk of  Goal: *Absence of Falls  Description: Document Delicia Schmidt Fall Risk and appropriate interventions in the flowsheet. Outcome: Progressing Towards Goal  Note: Fall Risk Interventions:                                Problem: Patient Education: Go to Patient Education Activity  Goal: Patient/Family Education  Outcome: Progressing Towards Goal     Problem: Pressure Injury - Risk of  Goal: *Prevention of pressure injury  Description: Document Torrey Scale and appropriate interventions in the flowsheet. Outcome: Progressing Towards Goal  Note: Pressure Injury Interventions:  Sensory Interventions: Float heels    Moisture Interventions: Absorbent underpads, Apply protective barrier, creams and emollients, Check for incontinence Q2 hours and as needed, Internal/External urinary devices, Limit adult briefs, Maintain skin hydration (lotion/cream), Minimize layers, Moisture barrier    Activity Interventions: Chair cushion, Increase time out of bed, Pressure redistribution bed/mattress(bed type), PT/OT evaluation    Mobility Interventions: Chair cushion, Float heels, HOB 30 degrees or less, Pressure redistribution bed/mattress (bed type), PT/OT evaluation, Turn and reposition approx. every two hours(pillow and wedges)    Nutrition Interventions: Document food/fluid/supplement intake    Friction and Shear Interventions: Apply protective barrier, creams and emollients, Feet elevated on foot rest, Minimize layers                Problem: Patient Education: Go to Patient Education Activity  Goal: Patient/Family Education  Outcome: Progressing Towards Goal     Problem: Risk for Spread of Infection  Goal: Prevent transmission of infectious organism to others  Description: Prevent the transmission of infectious organisms to other patients, staff members, and visitors.   Outcome: Progressing Towards Goal     Problem: Patient Education:  Go to Education Activity  Goal: Patient/Family Education  Outcome: Progressing Towards Goal     Problem: Patient Education: Go to Patient Education Activity  Goal: Patient/Family Education  Outcome: Progressing Towards Goal     Problem: Patient Education: Go to Patient Education Activity  Goal: Patient/Family Education  Outcome: Progressing Towards Goal

## 2023-04-17 NOTE — PROGRESS NOTES
Problem: Self Care Deficits Care Plan (Adult)  Goal: *Acute Goals and Plan of Care (Insert Text)  Description: FUNCTIONAL STATUS PRIOR TO ADMISSION:  mod I PTA self care and functional mobility    HOME SUPPORT: The patient lived with son but did not require assist. Has 5 other local children; son works during the day out of home    Occupational Therapy Goals  Initiated 4/13/2023  1. Patient will perform grooming in standing VSS with supervision/set-up within 7 day(s). 2.  Patient will perform bathing with supervision/set-up within 7 day(s). 3.  Patient will perform upper body dressing and lower body dressing with supervision/set-up within 7 day(s). 4.  Patient will perform toilet transfers with supervision/set-up within 7 day(s). 5.  Patient will perform all aspects of toileting with supervision/set-up within 7 day(s). 6.  Patient will participate in upper extremity therapeutic exercise/activities with supervision/set-up for 5 minutes within 7 day(s). 7.  Patient will utilize energy conservation techniques during functional activities with verbal cues within   7 day(s). .  Outcome: Not Progressing Towards Goal   OCCUPATIONAL THERAPY TREATMENT  Patient: Nydia Guillaume (43 y.o. female)  Date: 4/17/2023  Diagnosis: GI bleed [K92.2] <principal problem not specified>  Procedure(s) (LRB):  ESOPHAGOGASTRODUODENOSCOPY (EGD) (N/A)  COLONOSCOPY (N/A)  ESOPHAGOGASTRODUODENAL (EGD) BIOPSY (N/A) 3 Days Post-Op  Precautions: Fall, Contact, Bed Alarm (T-12 subacute fx; anxiety; POTs;3-21-23 mild L sara)  Chart, occupational therapy assessment, plan of care, and goals were reviewed. ASSESSMENT  Patient continues with skilled OT services and is not progressing towards goals as anticipated due to orthostatic hypotension. She is min A for bed mobility--sat EOB for time for ADL tasks to allow BP to compensate prior to attempting to walk to bathroom.  After 2 steps to bathroom c/o dizziness, backed up to chair and stood for standing BP. She continues to exhibit orthostatic hypotension which is barrier to progression--provided [facility] for use as she is using purwick currently, educated on possibility of rollator use at home in community so she could always sit if needed if she feels s/s coming due to h/o POTS. She will benefit from SNF for rehab as she was I and active PTA. Left in chair without s/s. Vitals:    04/17/23 1044 04/17/23 1048 04/17/23 1103 04/17/23 1106   BP: 127/82 114/72 97/66 103/61   BP 1 Location: Left upper arm Left upper arm Right upper arm Left upper arm   BP Patient Position: Semi fowlers Sitting Standing  Comment: with c/o lightheadedness    Pulse: 87 88  85   Temp:       Resp:       Height:       Weight:       SpO2:            Current Level of Function Impacting Discharge (ADLs): see above and below    Other factors to consider for discharge: POTS         PLAN :  Patient continues to benefit from skilled intervention to address the above impairments. Continue treatment per established plan of care to address goals. Recommendation for discharge: (in order for the patient to meet his/her long term goals)  Therapy up to 5 days/week in SNF setting    This discharge recommendation:  Has not yet been discussed the attending provider and/or case management    IF patient discharges home will need the following DME: defer to SNF, maybe rollator       SUBJECTIVE:   Patient stated I fall fast when my POTS acts up.     OBJECTIVE DATA SUMMARY:   Cognitive/Behavioral Status:                      Functional Mobility and Transfers for ADLs:  Bed Mobility:  Supine to Sit: Minimum assistance;Assist x1;Additional time; Adaptive equipment;Bed Modified  Scooting: Stand-by assistance    Transfers:  Sit to Stand: Contact guard assistance; Additional time; Adaptive equipment  Functional Transfers  Bathroom Mobility:  (UNABLE)  Toilet Transfer :  (UNABLE, provided BSC for use at bedside due to POTS, orthostatic hypotension, uanble to mobilize to bathroom.)  Bed to Chair: Contact guard assistance;Assist x1;Additional time; Adaptive equipment    Balance:  Sitting: Intact  Standing: Impaired; With support  Standing - Static: Good;Constant support  Standing - Dynamic :  (unable due to LIGHTHEADEDNESS, hypotension)    ADL Intervention:       Grooming  Grooming Assistance:  (performed EOB to allow vitals time to stabilize prior to tx due to orthostatic hypotension, an dPOTS>)  Position Performed: Seated edge of bed  Washing Face: Set-up  Washing Hands: Set-up  Brushing Teeth: Set-up    Upper Body Bathing  Bathing Assistance:  (already bathed with nurse)    Type of Bath: Chlorhexidine (CHG); Basin/Soap/Water                             Therapeutic Exercises:       Pain:  No c/o    Activity Tolerance:   Fair, signs and symptoms of orthostatic hypotension, and great motivation    After treatment patient left in no apparent distress:   Supine in bed, Call bell within reach, and Bed / chair alarm activated    COMMUNICATION/COLLABORATION:   The patients plan of care was discussed with: Registered nurse.      Doug Bumpers, OTR/L  Time Calculation: 41 mins

## 2023-04-18 LAB
BACTERIA SPEC CULT: NORMAL
BACTERIA SPEC CULT: NORMAL
SERVICE CMNT-IMP: NORMAL
SERVICE CMNT-IMP: NORMAL

## 2023-04-26 NOTE — PROGRESS NOTES
Physician Progress Note      PATIENT:               Deneen Mckeon  CSN #:                  [id number]  :                       1960  ADMIT DATE:       2023 2:19 PM  [address] [place] DATE:        2023 3:45 PM  RESPONDING  PROVIDER #:        Nancy Hartman MD          QUERY TEXT:    Patient admitted with GI bleeding, noted to have  Grade 1 internal hemorrhoids, . If possible, please document in progress notes and discharge summary the cause of the GI bleeding: The medical record reflects the following:  Risk Factors: Xarelto  Clinical Indicators:  egd colon findings: Grade 1 internal hemorrhoids;  these are likel source of rectal bleeding noted  Treatment: EGD/Colon, gastric bx,  BID PPI x 2wks  Thanks, Triston Boogie RN/CDI   Options provided:  -- GI bleeding due to hemorrhoids  -- Other - I will add my own diagnosis  -- Disagree - Not applicable / Not valid  -- Disagree - Clinically unable to determine / Unknown  -- Refer to Clinical Documentation Reviewer    PROVIDER RESPONSE TEXT:    This patient has GI bleeding due to hemorrhoids.     Query created by: Gilman Spurling on 2023 11:34 AM      Electronically signed by:  Nancy Hartman MD 2023 7:21 AM

## 2023-06-01 ENCOUNTER — APPOINTMENT (OUTPATIENT)
Facility: HOSPITAL | Age: 63
DRG: 871 | End: 2023-06-01
Payer: MEDICARE

## 2023-06-01 ENCOUNTER — HOSPITAL ENCOUNTER (INPATIENT)
Facility: HOSPITAL | Age: 63
LOS: 4 days | Discharge: HOME HEALTH | DRG: 871 | End: 2023-06-05
Attending: STUDENT IN AN ORGANIZED HEALTH CARE EDUCATION/TRAINING PROGRAM | Admitting: INTERNAL MEDICINE
Payer: MEDICARE

## 2023-06-01 DIAGNOSIS — N17.9 AKI (ACUTE KIDNEY INJURY) (HCC): ICD-10-CM

## 2023-06-01 DIAGNOSIS — E87.1 HYPONATREMIA: ICD-10-CM

## 2023-06-01 DIAGNOSIS — N39.0 URINARY TRACT INFECTION WITHOUT HEMATURIA, SITE UNSPECIFIED: ICD-10-CM

## 2023-06-01 DIAGNOSIS — I95.1 ORTHOSTATIC HYPOTENSION: ICD-10-CM

## 2023-06-01 DIAGNOSIS — R53.83 OTHER FATIGUE: Primary | ICD-10-CM

## 2023-06-01 LAB
ALBUMIN SERPL-MCNC: 1.2 G/DL (ref 3.5–5)
ALBUMIN/GLOB SERPL: 0.3 (ref 1.1–2.2)
ALP SERPL-CCNC: 223 U/L (ref 45–117)
ALT SERPL-CCNC: 20 U/L (ref 12–78)
ANION GAP SERPL CALC-SCNC: 7 MMOL/L (ref 5–15)
APPEARANCE UR: ABNORMAL
AST SERPL-CCNC: 56 U/L (ref 15–37)
BACTERIA URNS QL MICRO: ABNORMAL /HPF
BASOPHILS # BLD: 0 K/UL (ref 0–0.1)
BASOPHILS NFR BLD: 0 % (ref 0–1)
BILIRUB SERPL-MCNC: 0.3 MG/DL (ref 0.2–1)
BILIRUB UR QL: NEGATIVE
BUN SERPL-MCNC: 32 MG/DL (ref 6–20)
BUN/CREAT SERPL: 9 (ref 12–20)
CALCIUM SERPL-MCNC: 7 MG/DL (ref 8.5–10.1)
CHLORIDE SERPL-SCNC: 98 MMOL/L (ref 97–108)
CO2 SERPL-SCNC: 23 MMOL/L (ref 21–32)
COLOR UR: ABNORMAL
CREAT SERPL-MCNC: 3.64 MG/DL (ref 0.55–1.02)
DIFFERENTIAL METHOD BLD: ABNORMAL
EOSINOPHIL # BLD: 0.1 K/UL (ref 0–0.4)
EOSINOPHIL NFR BLD: 1 % (ref 0–7)
EPITH CASTS URNS QL MICRO: ABNORMAL /LPF
ERYTHROCYTE [DISTWIDTH] IN BLOOD BY AUTOMATED COUNT: 14.2 % (ref 11.5–14.5)
GLOBULIN SER CALC-MCNC: 4.4 G/DL (ref 2–4)
GLUCOSE BLD STRIP.AUTO-MCNC: 141 MG/DL (ref 65–117)
GLUCOSE BLD STRIP.AUTO-MCNC: 144 MG/DL (ref 65–117)
GLUCOSE BLD-MCNC: 122 MG/DL (ref 65–100)
GLUCOSE SERPL-MCNC: 160 MG/DL (ref 65–100)
GLUCOSE UR STRIP.AUTO-MCNC: NEGATIVE MG/DL
HCT VFR BLD AUTO: 24.4 % (ref 35–47)
HGB BLD-MCNC: 8.3 G/DL (ref 11.5–16)
HGB UR QL STRIP: ABNORMAL
IMM GRANULOCYTES # BLD AUTO: 0.2 K/UL (ref 0–0.04)
IMM GRANULOCYTES NFR BLD AUTO: 1 % (ref 0–0.5)
KETONES UR QL STRIP.AUTO: NEGATIVE MG/DL
LEUKOCYTE ESTERASE UR QL STRIP.AUTO: ABNORMAL
LYMPHOCYTES # BLD: 1.4 K/UL (ref 0.8–3.5)
LYMPHOCYTES NFR BLD: 10 % (ref 12–49)
MCH RBC QN AUTO: 31.9 PG (ref 26–34)
MCHC RBC AUTO-ENTMCNC: 34 G/DL (ref 30–36.5)
MCV RBC AUTO: 93.8 FL (ref 80–99)
MONOCYTES # BLD: 1.4 K/UL (ref 0–1)
MONOCYTES NFR BLD: 9 % (ref 5–13)
NEUTS SEG # BLD: 11.9 K/UL (ref 1.8–8)
NEUTS SEG NFR BLD: 79 % (ref 32–75)
NITRITE UR QL STRIP.AUTO: NEGATIVE
NRBC # BLD: 0 K/UL (ref 0–0.01)
NRBC BLD-RTO: 0 PER 100 WBC
OTHER: ABNORMAL
PH UR STRIP: 6.5 (ref 5–8)
PLATELET # BLD AUTO: 155 K/UL (ref 150–400)
PMV BLD AUTO: 10.6 FL (ref 8.9–12.9)
POTASSIUM SERPL-SCNC: 4.5 MMOL/L (ref 3.5–5.1)
PROCALCITONIN SERPL-MCNC: 36.52 NG/ML
PROT SERPL-MCNC: 5.6 G/DL (ref 6.4–8.2)
PROT UR STRIP-MCNC: 100 MG/DL
RBC # BLD AUTO: 2.6 M/UL (ref 3.8–5.2)
RBC #/AREA URNS HPF: ABNORMAL /HPF (ref 0–5)
SERVICE CMNT-IMP: ABNORMAL
SODIUM SERPL-SCNC: 128 MMOL/L (ref 136–145)
SP GR UR REFRACTOMETRY: 1.01
TROPONIN I SERPL HS-MCNC: 6 NG/L (ref 0–51)
UROBILINOGEN UR QL STRIP.AUTO: 0.2 EU/DL (ref 0.2–1)
WBC # BLD AUTO: 15 K/UL (ref 3.6–11)
WBC URNS QL MICRO: >100 /HPF (ref 0–4)

## 2023-06-01 PROCEDURE — 84145 PROCALCITONIN (PCT): CPT

## 2023-06-01 PROCEDURE — 99285 EMERGENCY DEPT VISIT HI MDM: CPT

## 2023-06-01 PROCEDURE — 2580000003 HC RX 258: Performed by: INTERNAL MEDICINE

## 2023-06-01 PROCEDURE — 6370000000 HC RX 637 (ALT 250 FOR IP): Performed by: INTERNAL MEDICINE

## 2023-06-01 PROCEDURE — 80053 COMPREHEN METABOLIC PANEL: CPT

## 2023-06-01 PROCEDURE — 6360000004 HC RX CONTRAST MEDICATION: Performed by: STUDENT IN AN ORGANIZED HEALTH CARE EDUCATION/TRAINING PROGRAM

## 2023-06-01 PROCEDURE — 87086 URINE CULTURE/COLONY COUNT: CPT

## 2023-06-01 PROCEDURE — 84484 ASSAY OF TROPONIN QUANT: CPT

## 2023-06-01 PROCEDURE — 85025 COMPLETE CBC W/AUTO DIFF WBC: CPT

## 2023-06-01 PROCEDURE — 83605 ASSAY OF LACTIC ACID: CPT

## 2023-06-01 PROCEDURE — 81001 URINALYSIS AUTO W/SCOPE: CPT

## 2023-06-01 PROCEDURE — 87077 CULTURE AEROBIC IDENTIFY: CPT

## 2023-06-01 PROCEDURE — 2580000003 HC RX 258: Performed by: STUDENT IN AN ORGANIZED HEALTH CARE EDUCATION/TRAINING PROGRAM

## 2023-06-01 PROCEDURE — 82962 GLUCOSE BLOOD TEST: CPT

## 2023-06-01 PROCEDURE — 87040 BLOOD CULTURE FOR BACTERIA: CPT

## 2023-06-01 PROCEDURE — 6370000000 HC RX 637 (ALT 250 FOR IP): Performed by: HOSPITALIST

## 2023-06-01 PROCEDURE — 74177 CT ABD & PELVIS W/CONTRAST: CPT

## 2023-06-01 PROCEDURE — 36415 COLL VENOUS BLD VENIPUNCTURE: CPT

## 2023-06-01 PROCEDURE — 71045 X-RAY EXAM CHEST 1 VIEW: CPT

## 2023-06-01 PROCEDURE — 6360000002 HC RX W HCPCS: Performed by: STUDENT IN AN ORGANIZED HEALTH CARE EDUCATION/TRAINING PROGRAM

## 2023-06-01 PROCEDURE — 87186 SC STD MICRODIL/AGAR DIL: CPT

## 2023-06-01 PROCEDURE — 1100000003 HC PRIVATE W/ TELEMETRY

## 2023-06-01 PROCEDURE — 6370000000 HC RX 637 (ALT 250 FOR IP): Performed by: STUDENT IN AN ORGANIZED HEALTH CARE EDUCATION/TRAINING PROGRAM

## 2023-06-01 PROCEDURE — 93005 ELECTROCARDIOGRAM TRACING: CPT | Performed by: STUDENT IN AN ORGANIZED HEALTH CARE EDUCATION/TRAINING PROGRAM

## 2023-06-01 PROCEDURE — 87150 DNA/RNA AMPLIFIED PROBE: CPT

## 2023-06-01 RX ORDER — PANTOPRAZOLE SODIUM 40 MG/1
40 TABLET, DELAYED RELEASE ORAL 2 TIMES DAILY
Status: DISCONTINUED | OUTPATIENT
Start: 2023-06-01 | End: 2023-06-05 | Stop reason: HOSPADM

## 2023-06-01 RX ORDER — DULOXETIN HYDROCHLORIDE 30 MG/1
30 CAPSULE, DELAYED RELEASE ORAL DAILY
Status: DISCONTINUED | OUTPATIENT
Start: 2023-06-01 | End: 2023-06-05 | Stop reason: HOSPADM

## 2023-06-01 RX ORDER — ACETAMINOPHEN 325 MG/1
650 TABLET ORAL EVERY 6 HOURS PRN
Status: DISCONTINUED | OUTPATIENT
Start: 2023-06-01 | End: 2023-06-05 | Stop reason: HOSPADM

## 2023-06-01 RX ORDER — LANOLIN ALCOHOL/MO/W.PET/CERES
3 CREAM (GRAM) TOPICAL NIGHTLY PRN
Status: DISCONTINUED | OUTPATIENT
Start: 2023-06-01 | End: 2023-06-05 | Stop reason: HOSPADM

## 2023-06-01 RX ORDER — ACETAMINOPHEN 650 MG/1
650 SUPPOSITORY RECTAL EVERY 6 HOURS PRN
Status: DISCONTINUED | OUTPATIENT
Start: 2023-06-01 | End: 2023-06-05 | Stop reason: HOSPADM

## 2023-06-01 RX ORDER — QUETIAPINE FUMARATE 25 MG/1
50 TABLET, FILM COATED ORAL NIGHTLY
Status: DISCONTINUED | OUTPATIENT
Start: 2023-06-01 | End: 2023-06-05 | Stop reason: HOSPADM

## 2023-06-01 RX ORDER — POLYETHYLENE GLYCOL 3350 17 G/17G
17 POWDER, FOR SOLUTION ORAL DAILY PRN
Status: DISCONTINUED | OUTPATIENT
Start: 2023-06-01 | End: 2023-06-05 | Stop reason: HOSPADM

## 2023-06-01 RX ORDER — TRAMADOL HYDROCHLORIDE 50 MG/1
50 TABLET ORAL EVERY 6 HOURS PRN
Status: DISCONTINUED | OUTPATIENT
Start: 2023-06-01 | End: 2023-06-05 | Stop reason: HOSPADM

## 2023-06-01 RX ORDER — BUSPIRONE HYDROCHLORIDE 5 MG/1
10 TABLET ORAL 3 TIMES DAILY
Status: DISCONTINUED | OUTPATIENT
Start: 2023-06-01 | End: 2023-06-05 | Stop reason: HOSPADM

## 2023-06-01 RX ORDER — ONDANSETRON 4 MG/1
4 TABLET, ORALLY DISINTEGRATING ORAL EVERY 8 HOURS PRN
Status: DISCONTINUED | OUTPATIENT
Start: 2023-06-01 | End: 2023-06-05 | Stop reason: HOSPADM

## 2023-06-01 RX ORDER — ROSUVASTATIN CALCIUM 40 MG/1
40 TABLET, COATED ORAL NIGHTLY
Status: DISCONTINUED | OUTPATIENT
Start: 2023-06-01 | End: 2023-06-01

## 2023-06-01 RX ORDER — SODIUM CHLORIDE 9 MG/ML
INJECTION, SOLUTION INTRAVENOUS PRN
Status: DISCONTINUED | OUTPATIENT
Start: 2023-06-01 | End: 2023-06-05 | Stop reason: HOSPADM

## 2023-06-01 RX ORDER — ROSUVASTATIN CALCIUM 10 MG/1
10 TABLET, COATED ORAL NIGHTLY
Status: DISCONTINUED | OUTPATIENT
Start: 2023-06-01 | End: 2023-06-05 | Stop reason: HOSPADM

## 2023-06-01 RX ORDER — SODIUM CHLORIDE 0.9 % (FLUSH) 0.9 %
5-40 SYRINGE (ML) INJECTION PRN
Status: DISCONTINUED | OUTPATIENT
Start: 2023-06-01 | End: 2023-06-05 | Stop reason: HOSPADM

## 2023-06-01 RX ORDER — FOLIC ACID 1 MG/1
1 TABLET ORAL DAILY
Status: DISCONTINUED | OUTPATIENT
Start: 2023-06-01 | End: 2023-06-05 | Stop reason: HOSPADM

## 2023-06-01 RX ORDER — MAGNESIUM OXIDE 400 MG/1
400 TABLET ORAL DAILY
Status: DISCONTINUED | OUTPATIENT
Start: 2023-06-01 | End: 2023-06-01

## 2023-06-01 RX ORDER — LANOLIN ALCOHOL/MO/W.PET/CERES
400 CREAM (GRAM) TOPICAL DAILY
Status: DISCONTINUED | OUTPATIENT
Start: 2023-06-01 | End: 2023-06-05 | Stop reason: HOSPADM

## 2023-06-01 RX ORDER — SODIUM CHLORIDE 0.9 % (FLUSH) 0.9 %
5-40 SYRINGE (ML) INJECTION EVERY 12 HOURS SCHEDULED
Status: DISCONTINUED | OUTPATIENT
Start: 2023-06-01 | End: 2023-06-05 | Stop reason: HOSPADM

## 2023-06-01 RX ORDER — QUETIAPINE FUMARATE 200 MG/1
200 TABLET, FILM COATED ORAL NIGHTLY
COMMUNITY

## 2023-06-01 RX ORDER — SODIUM CHLORIDE 9 MG/ML
INJECTION, SOLUTION INTRAVENOUS CONTINUOUS
Status: ACTIVE | OUTPATIENT
Start: 2023-06-01 | End: 2023-06-02

## 2023-06-01 RX ORDER — 0.9 % SODIUM CHLORIDE 0.9 %
500 INTRAVENOUS SOLUTION INTRAVENOUS ONCE
Status: COMPLETED | OUTPATIENT
Start: 2023-06-01 | End: 2023-06-01

## 2023-06-01 RX ORDER — ONDANSETRON 2 MG/ML
4 INJECTION INTRAMUSCULAR; INTRAVENOUS EVERY 6 HOURS PRN
Status: DISCONTINUED | OUTPATIENT
Start: 2023-06-01 | End: 2023-06-05 | Stop reason: HOSPADM

## 2023-06-01 RX ORDER — LIDOCAINE 4 G/G
1 PATCH TOPICAL
Status: COMPLETED | OUTPATIENT
Start: 2023-06-01 | End: 2023-06-02

## 2023-06-01 RX ORDER — SODIUM CHLORIDE 9 MG/ML
INJECTION, SOLUTION INTRAVENOUS CONTINUOUS
Status: DISCONTINUED | OUTPATIENT
Start: 2023-06-01 | End: 2023-06-01

## 2023-06-01 RX ADMIN — METOPROLOL TARTRATE 12.5 MG: 25 TABLET, FILM COATED ORAL at 13:20

## 2023-06-01 RX ADMIN — RIVAROXABAN 15 MG: 15 TABLET, FILM COATED ORAL at 13:20

## 2023-06-01 RX ADMIN — BUSPIRONE HYDROCHLORIDE 10 MG: 5 TABLET ORAL at 21:24

## 2023-06-01 RX ADMIN — TRAMADOL HYDROCHLORIDE 50 MG: 50 TABLET ORAL at 18:05

## 2023-06-01 RX ADMIN — Medication 400 MG: at 13:20

## 2023-06-01 RX ADMIN — DULOXETINE HYDROCHLORIDE 30 MG: 30 CAPSULE, DELAYED RELEASE ORAL at 13:20

## 2023-06-01 RX ADMIN — IOPAMIDOL 100 ML: 755 INJECTION, SOLUTION INTRAVENOUS at 11:02

## 2023-06-01 RX ADMIN — SODIUM CHLORIDE 500 ML: 900 INJECTION INTRAVENOUS at 12:37

## 2023-06-01 RX ADMIN — PANTOPRAZOLE SODIUM 40 MG: 40 TABLET, DELAYED RELEASE ORAL at 21:23

## 2023-06-01 RX ADMIN — SODIUM CHLORIDE: 9 INJECTION, SOLUTION INTRAVENOUS at 23:44

## 2023-06-01 RX ADMIN — SODIUM CHLORIDE 1000 MG: 900 INJECTION INTRAVENOUS at 12:37

## 2023-06-01 RX ADMIN — SODIUM CHLORIDE: 9 INJECTION, SOLUTION INTRAVENOUS at 17:00

## 2023-06-01 RX ADMIN — SODIUM CHLORIDE: 9 INJECTION, SOLUTION INTRAVENOUS at 13:24

## 2023-06-01 RX ADMIN — MELATONIN 3 MG: at 22:00

## 2023-06-01 RX ADMIN — ROSUVASTATIN CALCIUM 10 MG: 10 TABLET, FILM COATED ORAL at 21:24

## 2023-06-01 RX ADMIN — FOLIC ACID 1 MG: 1 TABLET ORAL at 13:20

## 2023-06-01 RX ADMIN — PANTOPRAZOLE SODIUM 40 MG: 40 TABLET, DELAYED RELEASE ORAL at 13:20

## 2023-06-01 RX ADMIN — SODIUM CHLORIDE, PRESERVATIVE FREE 10 ML: 5 INJECTION INTRAVENOUS at 21:24

## 2023-06-01 RX ADMIN — ACETAMINOPHEN 650 MG: 325 TABLET ORAL at 13:19

## 2023-06-01 RX ADMIN — QUETIAPINE FUMARATE 50 MG: 25 TABLET ORAL at 21:23

## 2023-06-01 ASSESSMENT — PAIN - FUNCTIONAL ASSESSMENT
PAIN_FUNCTIONAL_ASSESSMENT: ACTIVITIES ARE NOT PREVENTED

## 2023-06-01 ASSESSMENT — PAIN SCALES - GENERAL
PAINLEVEL_OUTOF10: 9
PAINLEVEL_OUTOF10: 0
PAINLEVEL_OUTOF10: 0
PAINLEVEL_OUTOF10: 8
PAINLEVEL_OUTOF10: 6

## 2023-06-01 ASSESSMENT — PAIN DESCRIPTION - DESCRIPTORS
DESCRIPTORS: ACHING

## 2023-06-01 ASSESSMENT — PAIN DESCRIPTION - LOCATION
LOCATION: HEAD
LOCATION: HEAD
LOCATION: BACK;HEAD

## 2023-06-01 ASSESSMENT — PAIN DESCRIPTION - ORIENTATION
ORIENTATION: LOWER
ORIENTATION: RIGHT
ORIENTATION: RIGHT

## 2023-06-02 ENCOUNTER — APPOINTMENT (OUTPATIENT)
Facility: HOSPITAL | Age: 63
DRG: 871 | End: 2023-06-02
Attending: INTERNAL MEDICINE
Payer: MEDICARE

## 2023-06-02 LAB
ACCESSION NUMBER, LLC1M: ABNORMAL
ACINETOBACTER CALCOAC BAUMANNII COMPLEX BY PCR: NOT DETECTED
ANION GAP SERPL CALC-SCNC: 7 MMOL/L (ref 5–15)
B FRAGILIS DNA BLD POS QL NAA+NON-PROBE: NOT DETECTED
BASOPHILS # BLD: 0 K/UL (ref 0–0.1)
BASOPHILS NFR BLD: 0 % (ref 0–1)
BIOFIRE TEST COMMENT: ABNORMAL
BLACTX-M ISLT/SPM QL: NOT DETECTED
BLAIMP ISLT/SPM QL: NOT DETECTED
BLAKPC BLD POS QL NAA+NON-PROBE: NOT DETECTED
BLAOXA-48-LIKE ISLT/SPM QL: NOT DETECTED
BLAVIM ISLT/SPM QL: NOT DETECTED
BUN SERPL-MCNC: 33 MG/DL (ref 6–20)
BUN/CREAT SERPL: 9 (ref 12–20)
C ALBICANS DNA BLD POS QL NAA+NON-PROBE: NOT DETECTED
C AURIS DNA BLD POS QL NAA+NON-PROBE: NOT DETECTED
C GATTII+NEOFOR DNA BLD POS QL NAA+N-PRB: NOT DETECTED
C GLABRATA DNA BLD POS QL NAA+NON-PROBE: NOT DETECTED
C KRUSEI DNA BLD POS QL NAA+NON-PROBE: NOT DETECTED
C PARAP DNA BLD POS QL NAA+NON-PROBE: NOT DETECTED
C TROPICLS DNA BLD POS QL NAA+NON-PROBE: NOT DETECTED
CALCIUM SERPL-MCNC: 7.1 MG/DL (ref 8.5–10.1)
CHLORIDE SERPL-SCNC: 100 MMOL/L (ref 97–108)
CO2 SERPL-SCNC: 23 MMOL/L (ref 21–32)
COLISTIN RES MCR-1 ISLT/SPM QL: NOT DETECTED
CREAT SERPL-MCNC: 3.56 MG/DL (ref 0.55–1.02)
DIFFERENTIAL METHOD BLD: ABNORMAL
E CLOAC COMP DNA BLD POS NAA+NON-PROBE: NOT DETECTED
E COLI DNA BLD POS QL NAA+NON-PROBE: NOT DETECTED
E FAECALIS DNA BLD POS QL NAA+NON-PROBE: NOT DETECTED
E FAECIUM DNA BLD POS QL NAA+NON-PROBE: NOT DETECTED
ECHO AV AREA PEAK VELOCITY: 3 CM2
ECHO AV AREA VTI: 3.2 CM2
ECHO AV AREA/BSA PEAK VELOCITY: 1.5 CM2/M2
ECHO AV AREA/BSA VTI: 1.6 CM2/M2
ECHO AV MEAN GRADIENT: 6 MMHG
ECHO AV MEAN VELOCITY: 1.1 M/S
ECHO AV PEAK GRADIENT: 11 MMHG
ECHO AV PEAK VELOCITY: 1.6 M/S
ECHO AV VELOCITY RATIO: 0.75
ECHO AV VTI: 33.5 CM
ECHO BSA: 2.06 M2
ECHO LA DIAMETER INDEX: 1.99 CM/M2
ECHO LA DIAMETER: 4 CM
ECHO LV E' LATERAL VELOCITY: 7 CM/S
ECHO LV E' SEPTAL VELOCITY: 6 CM/S
ECHO LV EDV A4C: 71 ML
ECHO LV EDV INDEX A4C: 35 ML/M2
ECHO LV EJECTION FRACTION A4C: 57 %
ECHO LV ESV A4C: 31 ML
ECHO LV ESV INDEX A4C: 15 ML/M2
ECHO LV FRACTIONAL SHORTENING: 18 % (ref 28–44)
ECHO LV INTERNAL DIMENSION DIASTOLE INDEX: 2.24 CM/M2
ECHO LV INTERNAL DIMENSION DIASTOLIC: 4.5 CM (ref 3.9–5.3)
ECHO LV INTERNAL DIMENSION SYSTOLIC INDEX: 1.84 CM/M2
ECHO LV INTERNAL DIMENSION SYSTOLIC: 3.7 CM
ECHO LV IVSD: 1.6 CM (ref 0.6–0.9)
ECHO LV MASS 2D: 319.6 G (ref 67–162)
ECHO LV MASS INDEX 2D: 159 G/M2 (ref 43–95)
ECHO LV POSTERIOR WALL DIASTOLIC: 1.7 CM (ref 0.6–0.9)
ECHO LV RELATIVE WALL THICKNESS RATIO: 0.76
ECHO LVOT AREA: 4.2 CM2
ECHO LVOT AV VTI INDEX: 0.81
ECHO LVOT DIAM: 2.3 CM
ECHO LVOT MEAN GRADIENT: 3 MMHG
ECHO LVOT PEAK GRADIENT: 6 MMHG
ECHO LVOT PEAK VELOCITY: 1.2 M/S
ECHO LVOT STROKE VOLUME INDEX: 55.8 ML/M2
ECHO LVOT SV: 112.1 ML
ECHO LVOT VTI: 27 CM
ECHO MV A VELOCITY: 1.28 M/S
ECHO MV AREA VTI: 4.2 CM2
ECHO MV E DECELERATION TIME (DT): 118.6 MS
ECHO MV E VELOCITY: 1.08 M/S
ECHO MV E/A RATIO: 0.84
ECHO MV E/E' LATERAL: 15.43
ECHO MV E/E' RATIO (AVERAGED): 16.71
ECHO MV E/E' SEPTAL: 18
ECHO MV LVOT VTI INDEX: 1
ECHO MV MAX VELOCITY: 1.4 M/S
ECHO MV MEAN GRADIENT: 5 MMHG
ECHO MV MEAN VELOCITY: 1.1 M/S
ECHO MV PEAK GRADIENT: 7 MMHG
ECHO MV VTI: 26.9 CM
ECHO PV MAX VELOCITY: 1.1 M/S
ECHO PV PEAK GRADIENT: 5 MMHG
EKG ATRIAL RATE: 79 BPM
EKG DIAGNOSIS: NORMAL
EKG P AXIS: 11 DEGREES
EKG P-R INTERVAL: 146 MS
EKG Q-T INTERVAL: 420 MS
EKG QRS DURATION: 96 MS
EKG QTC CALCULATION (BAZETT): 481 MS
EKG R AXIS: -6 DEGREES
EKG T AXIS: 28 DEGREES
EKG VENTRICULAR RATE: 79 BPM
ENTEROBACTERALES DNA BLD POS NAA+N-PRB: DETECTED
EOSINOPHIL # BLD: 0.1 K/UL (ref 0–0.4)
EOSINOPHIL NFR BLD: 1 % (ref 0–7)
ERYTHROCYTE [DISTWIDTH] IN BLOOD BY AUTOMATED COUNT: 14.5 % (ref 11.5–14.5)
GLUCOSE BLD STRIP.AUTO-MCNC: 108 MG/DL (ref 65–117)
GLUCOSE SERPL-MCNC: 90 MG/DL (ref 65–100)
GP B STREP DNA BLD POS QL NAA+NON-PROBE: NOT DETECTED
HAEM INFLU DNA BLD POS QL NAA+NON-PROBE: NOT DETECTED
HCT VFR BLD AUTO: 23.9 % (ref 35–47)
HGB BLD-MCNC: 8.1 G/DL (ref 11.5–16)
IMM GRANULOCYTES # BLD AUTO: 0.2 K/UL (ref 0–0.04)
IMM GRANULOCYTES NFR BLD AUTO: 1 % (ref 0–0.5)
K OXYTOCA DNA BLD POS QL NAA+NON-PROBE: NOT DETECTED
KLEBSIELLA SP DNA BLD POS QL NAA+NON-PRB: DETECTED
KLEBSIELLA SP DNA BLD POS QL NAA+NON-PRB: NOT DETECTED
L MONOCYTOG DNA BLD POS QL NAA+NON-PROBE: NOT DETECTED
LACTATE BLD-SCNC: 1.41 MMOL/L (ref 0.4–2)
LYMPHOCYTES # BLD: 1.6 K/UL (ref 0.8–3.5)
LYMPHOCYTES NFR BLD: 9 % (ref 12–49)
MCH RBC QN AUTO: 31.8 PG (ref 26–34)
MCHC RBC AUTO-ENTMCNC: 33.9 G/DL (ref 30–36.5)
MCV RBC AUTO: 93.7 FL (ref 80–99)
MONOCYTES # BLD: 1.4 K/UL (ref 0–1)
MONOCYTES NFR BLD: 8 % (ref 5–13)
N MEN DNA BLD POS QL NAA+NON-PROBE: NOT DETECTED
NEUTS SEG # BLD: 14.1 K/UL (ref 1.8–8)
NEUTS SEG NFR BLD: 81 % (ref 32–75)
NRBC # BLD: 0 K/UL (ref 0–0.01)
NRBC BLD-RTO: 0 PER 100 WBC
NT PRO BNP: 6284 PG/ML
P AERUGINOSA DNA BLD POS NAA+NON-PROBE: NOT DETECTED
PLATELET # BLD AUTO: 159 K/UL (ref 150–400)
PMV BLD AUTO: 10.4 FL (ref 8.9–12.9)
POTASSIUM SERPL-SCNC: 4.4 MMOL/L (ref 3.5–5.1)
PROTEUS SP DNA BLD POS QL NAA+NON-PROBE: NOT DETECTED
RBC # BLD AUTO: 2.55 M/UL (ref 3.8–5.2)
RESISTANT GENE NDM BY PCR: NOT DETECTED
RESISTANT GENE TARGETS: ABNORMAL
S AUREUS DNA BLD POS QL NAA+NON-PROBE: NOT DETECTED
S AUREUS+CONS DNA BLD POS NAA+NON-PROBE: NOT DETECTED
S EPIDERMIDIS DNA BLD POS QL NAA+NON-PRB: NOT DETECTED
S LUGDUNENSIS DNA BLD POS QL NAA+NON-PRB: NOT DETECTED
S MALTOPHILIA DNA BLD POS QL NAA+NON-PRB: NOT DETECTED
S MARCESCENS DNA BLD POS NAA+NON-PROBE: NOT DETECTED
S PNEUM DNA BLD POS QL NAA+NON-PROBE: NOT DETECTED
S PYO DNA BLD POS QL NAA+NON-PROBE: NOT DETECTED
SALMONELLA DNA BLD POS QL NAA+NON-PROBE: NOT DETECTED
SERVICE CMNT-IMP: NORMAL
SODIUM SERPL-SCNC: 130 MMOL/L (ref 136–145)
STREPTOCOCCUS DNA BLD POS NAA+NON-PROBE: NOT DETECTED
WBC # BLD AUTO: 17.4 K/UL (ref 3.6–11)

## 2023-06-02 PROCEDURE — 6360000002 HC RX W HCPCS: Performed by: INTERNAL MEDICINE

## 2023-06-02 PROCEDURE — 97530 THERAPEUTIC ACTIVITIES: CPT

## 2023-06-02 PROCEDURE — 6370000000 HC RX 637 (ALT 250 FOR IP): Performed by: HOSPITALIST

## 2023-06-02 PROCEDURE — 97530 THERAPEUTIC ACTIVITIES: CPT | Performed by: OCCUPATIONAL THERAPIST

## 2023-06-02 PROCEDURE — 97535 SELF CARE MNGMENT TRAINING: CPT | Performed by: OCCUPATIONAL THERAPIST

## 2023-06-02 PROCEDURE — 85025 COMPLETE CBC W/AUTO DIFF WBC: CPT

## 2023-06-02 PROCEDURE — 97162 PT EVAL MOD COMPLEX 30 MIN: CPT

## 2023-06-02 PROCEDURE — 93308 TTE F-UP OR LMTD: CPT

## 2023-06-02 PROCEDURE — 83880 ASSAY OF NATRIURETIC PEPTIDE: CPT

## 2023-06-02 PROCEDURE — 97165 OT EVAL LOW COMPLEX 30 MIN: CPT | Performed by: OCCUPATIONAL THERAPIST

## 2023-06-02 PROCEDURE — 2580000003 HC RX 258: Performed by: INTERNAL MEDICINE

## 2023-06-02 PROCEDURE — 80048 BASIC METABOLIC PNL TOTAL CA: CPT

## 2023-06-02 PROCEDURE — 1100000003 HC PRIVATE W/ TELEMETRY

## 2023-06-02 PROCEDURE — 36415 COLL VENOUS BLD VENIPUNCTURE: CPT

## 2023-06-02 PROCEDURE — 6370000000 HC RX 637 (ALT 250 FOR IP): Performed by: INTERNAL MEDICINE

## 2023-06-02 RX ORDER — MIDODRINE HYDROCHLORIDE 5 MG/1
10 TABLET ORAL 3 TIMES DAILY
Status: DISCONTINUED | OUTPATIENT
Start: 2023-06-02 | End: 2023-06-03

## 2023-06-02 RX ORDER — MIDODRINE HYDROCHLORIDE 5 MG/1
10 TABLET ORAL 3 TIMES DAILY
COMMUNITY

## 2023-06-02 RX ADMIN — BUSPIRONE HYDROCHLORIDE 10 MG: 5 TABLET ORAL at 09:14

## 2023-06-02 RX ADMIN — PANTOPRAZOLE SODIUM 40 MG: 40 TABLET, DELAYED RELEASE ORAL at 09:14

## 2023-06-02 RX ADMIN — SODIUM CHLORIDE, PRESERVATIVE FREE 10 ML: 5 INJECTION INTRAVENOUS at 21:53

## 2023-06-02 RX ADMIN — TRAMADOL HYDROCHLORIDE 50 MG: 50 TABLET ORAL at 22:34

## 2023-06-02 RX ADMIN — DULOXETINE HYDROCHLORIDE 30 MG: 30 CAPSULE, DELAYED RELEASE ORAL at 09:14

## 2023-06-02 RX ADMIN — METOPROLOL TARTRATE 12.5 MG: 25 TABLET, FILM COATED ORAL at 21:51

## 2023-06-02 RX ADMIN — ROSUVASTATIN CALCIUM 10 MG: 10 TABLET, FILM COATED ORAL at 21:52

## 2023-06-02 RX ADMIN — SODIUM CHLORIDE, PRESERVATIVE FREE 10 ML: 5 INJECTION INTRAVENOUS at 09:15

## 2023-06-02 RX ADMIN — QUETIAPINE FUMARATE 50 MG: 25 TABLET ORAL at 21:52

## 2023-06-02 RX ADMIN — TRAMADOL HYDROCHLORIDE 50 MG: 50 TABLET ORAL at 16:42

## 2023-06-02 RX ADMIN — FOLIC ACID 1 MG: 1 TABLET ORAL at 09:14

## 2023-06-02 RX ADMIN — RIVAROXABAN 15 MG: 15 TABLET, FILM COATED ORAL at 09:15

## 2023-06-02 RX ADMIN — BUSPIRONE HYDROCHLORIDE 10 MG: 5 TABLET ORAL at 13:08

## 2023-06-02 RX ADMIN — TRAMADOL HYDROCHLORIDE 50 MG: 50 TABLET ORAL at 09:12

## 2023-06-02 RX ADMIN — Medication 400 MG: at 09:15

## 2023-06-02 RX ADMIN — BUSPIRONE HYDROCHLORIDE 10 MG: 5 TABLET ORAL at 21:51

## 2023-06-02 RX ADMIN — PANTOPRAZOLE SODIUM 40 MG: 40 TABLET, DELAYED RELEASE ORAL at 21:52

## 2023-06-02 RX ADMIN — MELATONIN 3 MG: at 22:34

## 2023-06-02 RX ADMIN — SODIUM CHLORIDE 1000 MG: 900 INJECTION INTRAVENOUS at 09:14

## 2023-06-02 ASSESSMENT — PAIN SCALES - GENERAL
PAINLEVEL_OUTOF10: 7
PAINLEVEL_OUTOF10: 4
PAINLEVEL_OUTOF10: 8

## 2023-06-02 ASSESSMENT — PAIN DESCRIPTION - DESCRIPTORS
DESCRIPTORS: ACHING
DESCRIPTORS: ACHING;DISCOMFORT
DESCRIPTORS: SORE;TENDER

## 2023-06-02 ASSESSMENT — PAIN DESCRIPTION - LOCATION
LOCATION: BACK

## 2023-06-02 ASSESSMENT — PAIN SCALES - WONG BAKER: WONGBAKER_NUMERICALRESPONSE: 0

## 2023-06-02 ASSESSMENT — PAIN DESCRIPTION - ORIENTATION
ORIENTATION: MID
ORIENTATION: MID

## 2023-06-03 LAB
ANION GAP SERPL CALC-SCNC: 8 MMOL/L (ref 5–15)
BACTERIA SPEC CULT: ABNORMAL
BASOPHILS # BLD: 0 K/UL (ref 0–0.1)
BASOPHILS NFR BLD: 0 % (ref 0–1)
BUN SERPL-MCNC: 32 MG/DL (ref 6–20)
BUN/CREAT SERPL: 9 (ref 12–20)
CALCIUM SERPL-MCNC: 7.3 MG/DL (ref 8.5–10.1)
CC UR VC: ABNORMAL
CHLORIDE SERPL-SCNC: 102 MMOL/L (ref 97–108)
CO2 SERPL-SCNC: 22 MMOL/L (ref 21–32)
CREAT SERPL-MCNC: 3.43 MG/DL (ref 0.55–1.02)
DIFFERENTIAL METHOD BLD: ABNORMAL
EOSINOPHIL # BLD: 0.1 K/UL (ref 0–0.4)
EOSINOPHIL NFR BLD: 1 % (ref 0–7)
ERYTHROCYTE [DISTWIDTH] IN BLOOD BY AUTOMATED COUNT: 14.6 % (ref 11.5–14.5)
GLUCOSE SERPL-MCNC: 67 MG/DL (ref 65–100)
HCT VFR BLD AUTO: 23.7 % (ref 35–47)
HGB BLD-MCNC: 7.9 G/DL (ref 11.5–16)
IMM GRANULOCYTES # BLD AUTO: 0.2 K/UL (ref 0–0.04)
IMM GRANULOCYTES NFR BLD AUTO: 1 % (ref 0–0.5)
LYMPHOCYTES # BLD: 1.1 K/UL (ref 0.8–3.5)
LYMPHOCYTES NFR BLD: 7 % (ref 12–49)
MCH RBC QN AUTO: 31.2 PG (ref 26–34)
MCHC RBC AUTO-ENTMCNC: 33.3 G/DL (ref 30–36.5)
MCV RBC AUTO: 93.7 FL (ref 80–99)
MONOCYTES # BLD: 1 K/UL (ref 0–1)
MONOCYTES NFR BLD: 6 % (ref 5–13)
NEUTS SEG # BLD: 13.7 K/UL (ref 1.8–8)
NEUTS SEG NFR BLD: 85 % (ref 32–75)
NRBC # BLD: 0 K/UL (ref 0–0.01)
NRBC BLD-RTO: 0 PER 100 WBC
PLATELET # BLD AUTO: 171 K/UL (ref 150–400)
PMV BLD AUTO: 10.5 FL (ref 8.9–12.9)
POTASSIUM SERPL-SCNC: 4.4 MMOL/L (ref 3.5–5.1)
RBC # BLD AUTO: 2.53 M/UL (ref 3.8–5.2)
SERVICE CMNT-IMP: ABNORMAL
SODIUM SERPL-SCNC: 132 MMOL/L (ref 136–145)
WBC # BLD AUTO: 16.1 K/UL (ref 3.6–11)

## 2023-06-03 PROCEDURE — 6370000000 HC RX 637 (ALT 250 FOR IP): Performed by: INTERNAL MEDICINE

## 2023-06-03 PROCEDURE — 80048 BASIC METABOLIC PNL TOTAL CA: CPT

## 2023-06-03 PROCEDURE — 1100000003 HC PRIVATE W/ TELEMETRY

## 2023-06-03 PROCEDURE — 2580000003 HC RX 258: Performed by: STUDENT IN AN ORGANIZED HEALTH CARE EDUCATION/TRAINING PROGRAM

## 2023-06-03 PROCEDURE — 6360000002 HC RX W HCPCS: Performed by: STUDENT IN AN ORGANIZED HEALTH CARE EDUCATION/TRAINING PROGRAM

## 2023-06-03 PROCEDURE — 6370000000 HC RX 637 (ALT 250 FOR IP): Performed by: STUDENT IN AN ORGANIZED HEALTH CARE EDUCATION/TRAINING PROGRAM

## 2023-06-03 PROCEDURE — 36415 COLL VENOUS BLD VENIPUNCTURE: CPT

## 2023-06-03 PROCEDURE — 2580000003 HC RX 258: Performed by: INTERNAL MEDICINE

## 2023-06-03 PROCEDURE — 6370000000 HC RX 637 (ALT 250 FOR IP): Performed by: HOSPITALIST

## 2023-06-03 PROCEDURE — 85025 COMPLETE CBC W/AUTO DIFF WBC: CPT

## 2023-06-03 RX ORDER — MIDODRINE HYDROCHLORIDE 5 MG/1
10 TABLET ORAL 3 TIMES DAILY PRN
Status: DISCONTINUED | OUTPATIENT
Start: 2023-06-03 | End: 2023-06-05 | Stop reason: HOSPADM

## 2023-06-03 RX ADMIN — SODIUM CHLORIDE, PRESERVATIVE FREE 10 ML: 5 INJECTION INTRAVENOUS at 21:29

## 2023-06-03 RX ADMIN — TRAMADOL HYDROCHLORIDE 50 MG: 50 TABLET ORAL at 10:48

## 2023-06-03 RX ADMIN — PANTOPRAZOLE SODIUM 40 MG: 40 TABLET, DELAYED RELEASE ORAL at 21:28

## 2023-06-03 RX ADMIN — Medication 400 MG: at 10:44

## 2023-06-03 RX ADMIN — SODIUM CHLORIDE 1000 MG: 900 INJECTION INTRAVENOUS at 10:45

## 2023-06-03 RX ADMIN — RIVAROXABAN 15 MG: 15 TABLET, FILM COATED ORAL at 10:43

## 2023-06-03 RX ADMIN — PANTOPRAZOLE SODIUM 40 MG: 40 TABLET, DELAYED RELEASE ORAL at 10:44

## 2023-06-03 RX ADMIN — BUSPIRONE HYDROCHLORIDE 10 MG: 5 TABLET ORAL at 14:27

## 2023-06-03 RX ADMIN — MIDODRINE HYDROCHLORIDE 10 MG: 5 TABLET ORAL at 12:24

## 2023-06-03 RX ADMIN — FOLIC ACID 1 MG: 1 TABLET ORAL at 10:44

## 2023-06-03 RX ADMIN — TRAMADOL HYDROCHLORIDE 50 MG: 50 TABLET ORAL at 17:21

## 2023-06-03 RX ADMIN — DULOXETINE HYDROCHLORIDE 30 MG: 30 CAPSULE, DELAYED RELEASE ORAL at 10:43

## 2023-06-03 RX ADMIN — MELATONIN 3 MG: at 22:59

## 2023-06-03 RX ADMIN — ROSUVASTATIN CALCIUM 10 MG: 10 TABLET, FILM COATED ORAL at 21:28

## 2023-06-03 RX ADMIN — BUSPIRONE HYDROCHLORIDE 10 MG: 5 TABLET ORAL at 21:28

## 2023-06-03 RX ADMIN — METOPROLOL TARTRATE 12.5 MG: 25 TABLET, FILM COATED ORAL at 10:44

## 2023-06-03 RX ADMIN — BUSPIRONE HYDROCHLORIDE 10 MG: 5 TABLET ORAL at 10:43

## 2023-06-03 RX ADMIN — METOPROLOL TARTRATE 12.5 MG: 25 TABLET, FILM COATED ORAL at 21:28

## 2023-06-03 RX ADMIN — SODIUM CHLORIDE, PRESERVATIVE FREE 10 ML: 5 INJECTION INTRAVENOUS at 11:02

## 2023-06-03 RX ADMIN — TRAMADOL HYDROCHLORIDE 50 MG: 50 TABLET ORAL at 22:59

## 2023-06-03 RX ADMIN — QUETIAPINE FUMARATE 50 MG: 25 TABLET ORAL at 21:28

## 2023-06-03 RX ADMIN — MIDODRINE HYDROCHLORIDE 10 MG: 5 TABLET ORAL at 06:51

## 2023-06-03 ASSESSMENT — PAIN SCALES - GENERAL
PAINLEVEL_OUTOF10: 8
PAINLEVEL_OUTOF10: 7
PAINLEVEL_OUTOF10: 8

## 2023-06-03 ASSESSMENT — PAIN DESCRIPTION - DESCRIPTORS
DESCRIPTORS: OTHER (COMMENT)
DESCRIPTORS: SORE;TENDER
DESCRIPTORS: OTHER (COMMENT)

## 2023-06-03 ASSESSMENT — PAIN DESCRIPTION - ORIENTATION
ORIENTATION: LOWER;MID
ORIENTATION: LOWER;RIGHT

## 2023-06-03 ASSESSMENT — PAIN DESCRIPTION - LOCATION
LOCATION: SACRUM
LOCATION: BACK
LOCATION: BACK

## 2023-06-04 LAB
ANION GAP SERPL CALC-SCNC: 5 MMOL/L (ref 5–15)
BACTERIA SPEC CULT: ABNORMAL
BACTERIA SPEC CULT: ABNORMAL
BASOPHILS # BLD: 0 K/UL (ref 0–0.1)
BASOPHILS NFR BLD: 0 % (ref 0–1)
BUN SERPL-MCNC: 33 MG/DL (ref 6–20)
BUN/CREAT SERPL: 10 (ref 12–20)
CALCIUM SERPL-MCNC: 7.4 MG/DL (ref 8.5–10.1)
CHLORIDE SERPL-SCNC: 102 MMOL/L (ref 97–108)
CO2 SERPL-SCNC: 24 MMOL/L (ref 21–32)
CREAT SERPL-MCNC: 3.21 MG/DL (ref 0.55–1.02)
DIFFERENTIAL METHOD BLD: ABNORMAL
EOSINOPHIL # BLD: 0.2 K/UL (ref 0–0.4)
EOSINOPHIL NFR BLD: 1 % (ref 0–7)
ERYTHROCYTE [DISTWIDTH] IN BLOOD BY AUTOMATED COUNT: 14.6 % (ref 11.5–14.5)
GLUCOSE SERPL-MCNC: 64 MG/DL (ref 65–100)
HCT VFR BLD AUTO: 25.1 % (ref 35–47)
HGB BLD-MCNC: 8.4 G/DL (ref 11.5–16)
IMM GRANULOCYTES # BLD AUTO: 0.1 K/UL (ref 0–0.04)
IMM GRANULOCYTES NFR BLD AUTO: 1 % (ref 0–0.5)
LYMPHOCYTES # BLD: 1.4 K/UL (ref 0.8–3.5)
LYMPHOCYTES NFR BLD: 10 % (ref 12–49)
MCH RBC QN AUTO: 31.3 PG (ref 26–34)
MCHC RBC AUTO-ENTMCNC: 33.5 G/DL (ref 30–36.5)
MCV RBC AUTO: 93.7 FL (ref 80–99)
MONOCYTES # BLD: 1 K/UL (ref 0–1)
MONOCYTES NFR BLD: 7 % (ref 5–13)
NEUTS SEG # BLD: 11.9 K/UL (ref 1.8–8)
NEUTS SEG NFR BLD: 81 % (ref 32–75)
NRBC # BLD: 0 K/UL (ref 0–0.01)
NRBC BLD-RTO: 0 PER 100 WBC
PLATELET # BLD AUTO: 178 K/UL (ref 150–400)
PMV BLD AUTO: 10.3 FL (ref 8.9–12.9)
POTASSIUM SERPL-SCNC: 4.7 MMOL/L (ref 3.5–5.1)
RBC # BLD AUTO: 2.68 M/UL (ref 3.8–5.2)
SERVICE CMNT-IMP: ABNORMAL
SODIUM SERPL-SCNC: 131 MMOL/L (ref 136–145)
WBC # BLD AUTO: 14.5 K/UL (ref 3.6–11)

## 2023-06-04 PROCEDURE — 85025 COMPLETE CBC W/AUTO DIFF WBC: CPT

## 2023-06-04 PROCEDURE — 6360000002 HC RX W HCPCS: Performed by: STUDENT IN AN ORGANIZED HEALTH CARE EDUCATION/TRAINING PROGRAM

## 2023-06-04 PROCEDURE — 1100000003 HC PRIVATE W/ TELEMETRY

## 2023-06-04 PROCEDURE — 6370000000 HC RX 637 (ALT 250 FOR IP): Performed by: HOSPITALIST

## 2023-06-04 PROCEDURE — 80048 BASIC METABOLIC PNL TOTAL CA: CPT

## 2023-06-04 PROCEDURE — 6370000000 HC RX 637 (ALT 250 FOR IP): Performed by: INTERNAL MEDICINE

## 2023-06-04 PROCEDURE — 2580000003 HC RX 258: Performed by: INTERNAL MEDICINE

## 2023-06-04 PROCEDURE — 36415 COLL VENOUS BLD VENIPUNCTURE: CPT

## 2023-06-04 PROCEDURE — 2580000003 HC RX 258: Performed by: STUDENT IN AN ORGANIZED HEALTH CARE EDUCATION/TRAINING PROGRAM

## 2023-06-04 RX ADMIN — DULOXETINE HYDROCHLORIDE 30 MG: 30 CAPSULE, DELAYED RELEASE ORAL at 09:13

## 2023-06-04 RX ADMIN — BUSPIRONE HYDROCHLORIDE 10 MG: 5 TABLET ORAL at 21:15

## 2023-06-04 RX ADMIN — METOPROLOL TARTRATE 12.5 MG: 25 TABLET, FILM COATED ORAL at 09:13

## 2023-06-04 RX ADMIN — ROSUVASTATIN CALCIUM 10 MG: 10 TABLET, FILM COATED ORAL at 21:15

## 2023-06-04 RX ADMIN — QUETIAPINE FUMARATE 50 MG: 25 TABLET ORAL at 21:15

## 2023-06-04 RX ADMIN — BUSPIRONE HYDROCHLORIDE 10 MG: 5 TABLET ORAL at 09:13

## 2023-06-04 RX ADMIN — Medication 400 MG: at 09:13

## 2023-06-04 RX ADMIN — POLYETHYLENE GLYCOL 3350 17 G: 17 POWDER, FOR SOLUTION ORAL at 13:38

## 2023-06-04 RX ADMIN — PANTOPRAZOLE SODIUM 40 MG: 40 TABLET, DELAYED RELEASE ORAL at 09:13

## 2023-06-04 RX ADMIN — TRAMADOL HYDROCHLORIDE 50 MG: 50 TABLET ORAL at 17:46

## 2023-06-04 RX ADMIN — RIVAROXABAN 15 MG: 15 TABLET, FILM COATED ORAL at 09:13

## 2023-06-04 RX ADMIN — SODIUM CHLORIDE, PRESERVATIVE FREE 10 ML: 5 INJECTION INTRAVENOUS at 09:15

## 2023-06-04 RX ADMIN — TRAMADOL HYDROCHLORIDE 50 MG: 50 TABLET ORAL at 05:25

## 2023-06-04 RX ADMIN — SODIUM CHLORIDE, PRESERVATIVE FREE 10 ML: 5 INJECTION INTRAVENOUS at 21:17

## 2023-06-04 RX ADMIN — BUSPIRONE HYDROCHLORIDE 10 MG: 5 TABLET ORAL at 13:38

## 2023-06-04 RX ADMIN — MELATONIN 3 MG: at 23:51

## 2023-06-04 RX ADMIN — SODIUM CHLORIDE 1000 MG: 900 INJECTION INTRAVENOUS at 09:15

## 2023-06-04 RX ADMIN — METOPROLOL TARTRATE 12.5 MG: 25 TABLET, FILM COATED ORAL at 21:15

## 2023-06-04 RX ADMIN — FOLIC ACID 1 MG: 1 TABLET ORAL at 09:13

## 2023-06-04 RX ADMIN — PANTOPRAZOLE SODIUM 40 MG: 40 TABLET, DELAYED RELEASE ORAL at 21:15

## 2023-06-04 RX ADMIN — TRAMADOL HYDROCHLORIDE 50 MG: 50 TABLET ORAL at 23:51

## 2023-06-04 ASSESSMENT — PAIN SCALES - GENERAL
PAINLEVEL_OUTOF10: 8
PAINLEVEL_OUTOF10: 8
PAINLEVEL_OUTOF10: 1

## 2023-06-04 ASSESSMENT — PAIN DESCRIPTION - LOCATION
LOCATION: BACK

## 2023-06-04 ASSESSMENT — PAIN DESCRIPTION - ORIENTATION
ORIENTATION: POSTERIOR;RIGHT
ORIENTATION: POSTERIOR;RIGHT;LOWER

## 2023-06-04 ASSESSMENT — PAIN DESCRIPTION - DESCRIPTORS
DESCRIPTORS: OTHER (COMMENT)
DESCRIPTORS: SORE;TENDER
DESCRIPTORS: SORE;TENDER

## 2023-06-05 VITALS
OXYGEN SATURATION: 97 % | SYSTOLIC BLOOD PRESSURE: 144 MMHG | HEIGHT: 67 IN | DIASTOLIC BLOOD PRESSURE: 74 MMHG | BODY MASS INDEX: 30.92 KG/M2 | TEMPERATURE: 97.7 F | HEART RATE: 84 BPM | WEIGHT: 197 LBS | RESPIRATION RATE: 16 BRPM

## 2023-06-05 LAB
ANION GAP SERPL CALC-SCNC: 5 MMOL/L (ref 5–15)
BASOPHILS # BLD: 0 K/UL (ref 0–0.1)
BASOPHILS NFR BLD: 0 % (ref 0–1)
BUN SERPL-MCNC: 31 MG/DL (ref 6–20)
BUN/CREAT SERPL: 11 (ref 12–20)
CALCIUM SERPL-MCNC: 7.6 MG/DL (ref 8.5–10.1)
CHLORIDE SERPL-SCNC: 103 MMOL/L (ref 97–108)
CO2 SERPL-SCNC: 26 MMOL/L (ref 21–32)
CREAT SERPL-MCNC: 2.94 MG/DL (ref 0.55–1.02)
DIFFERENTIAL METHOD BLD: ABNORMAL
EOSINOPHIL # BLD: 0.1 K/UL (ref 0–0.4)
EOSINOPHIL NFR BLD: 1 % (ref 0–7)
ERYTHROCYTE [DISTWIDTH] IN BLOOD BY AUTOMATED COUNT: 14.6 % (ref 11.5–14.5)
GLUCOSE SERPL-MCNC: 63 MG/DL (ref 65–100)
HCT VFR BLD AUTO: 26.1 % (ref 35–47)
HGB BLD-MCNC: 8.2 G/DL (ref 11.5–16)
IMM GRANULOCYTES # BLD AUTO: 0.1 K/UL (ref 0–0.04)
IMM GRANULOCYTES NFR BLD AUTO: 1 % (ref 0–0.5)
LYMPHOCYTES # BLD: 1.6 K/UL (ref 0.8–3.5)
LYMPHOCYTES NFR BLD: 13 % (ref 12–49)
MCH RBC QN AUTO: 30.1 PG (ref 26–34)
MCHC RBC AUTO-ENTMCNC: 31.4 G/DL (ref 30–36.5)
MCV RBC AUTO: 96 FL (ref 80–99)
MONOCYTES # BLD: 0.8 K/UL (ref 0–1)
MONOCYTES NFR BLD: 7 % (ref 5–13)
NEUTS SEG # BLD: 9.3 K/UL (ref 1.8–8)
NEUTS SEG NFR BLD: 78 % (ref 32–75)
NRBC # BLD: 0 K/UL (ref 0–0.01)
NRBC BLD-RTO: 0 PER 100 WBC
PLATELET # BLD AUTO: 181 K/UL (ref 150–400)
PMV BLD AUTO: 10.3 FL (ref 8.9–12.9)
POTASSIUM SERPL-SCNC: 4.4 MMOL/L (ref 3.5–5.1)
RBC # BLD AUTO: 2.72 M/UL (ref 3.8–5.2)
SODIUM SERPL-SCNC: 134 MMOL/L (ref 136–145)
WBC # BLD AUTO: 12 K/UL (ref 3.6–11)

## 2023-06-05 PROCEDURE — 2580000003 HC RX 258: Performed by: INTERNAL MEDICINE

## 2023-06-05 PROCEDURE — 2580000003 HC RX 258: Performed by: STUDENT IN AN ORGANIZED HEALTH CARE EDUCATION/TRAINING PROGRAM

## 2023-06-05 PROCEDURE — 36415 COLL VENOUS BLD VENIPUNCTURE: CPT

## 2023-06-05 PROCEDURE — 6360000002 HC RX W HCPCS: Performed by: STUDENT IN AN ORGANIZED HEALTH CARE EDUCATION/TRAINING PROGRAM

## 2023-06-05 PROCEDURE — 80048 BASIC METABOLIC PNL TOTAL CA: CPT

## 2023-06-05 PROCEDURE — 6370000000 HC RX 637 (ALT 250 FOR IP): Performed by: INTERNAL MEDICINE

## 2023-06-05 PROCEDURE — 85025 COMPLETE CBC W/AUTO DIFF WBC: CPT

## 2023-06-05 RX ORDER — LEVOFLOXACIN 750 MG/1
750 TABLET ORAL EVERY OTHER DAY
Qty: 3 TABLET | Refills: 0 | Status: SHIPPED | OUTPATIENT
Start: 2023-06-05 | End: 2023-06-11

## 2023-06-05 RX ADMIN — DULOXETINE HYDROCHLORIDE 30 MG: 30 CAPSULE, DELAYED RELEASE ORAL at 09:38

## 2023-06-05 RX ADMIN — BUSPIRONE HYDROCHLORIDE 10 MG: 5 TABLET ORAL at 09:38

## 2023-06-05 RX ADMIN — TRAMADOL HYDROCHLORIDE 50 MG: 50 TABLET ORAL at 06:00

## 2023-06-05 RX ADMIN — RIVAROXABAN 15 MG: 15 TABLET, FILM COATED ORAL at 08:38

## 2023-06-05 RX ADMIN — METOPROLOL TARTRATE 12.5 MG: 25 TABLET, FILM COATED ORAL at 09:38

## 2023-06-05 RX ADMIN — CEFTRIAXONE SODIUM 2000 MG: 2 INJECTION, POWDER, FOR SOLUTION INTRAMUSCULAR; INTRAVENOUS at 11:14

## 2023-06-05 RX ADMIN — PANTOPRAZOLE SODIUM 40 MG: 40 TABLET, DELAYED RELEASE ORAL at 09:38

## 2023-06-05 RX ADMIN — Medication 400 MG: at 09:38

## 2023-06-05 RX ADMIN — SODIUM CHLORIDE, PRESERVATIVE FREE 10 ML: 5 INJECTION INTRAVENOUS at 09:43

## 2023-06-05 RX ADMIN — FOLIC ACID 1 MG: 1 TABLET ORAL at 09:38

## 2023-06-05 RX ADMIN — BUSPIRONE HYDROCHLORIDE 10 MG: 5 TABLET ORAL at 15:13

## 2023-06-05 ASSESSMENT — PAIN DESCRIPTION - LOCATION
LOCATION: BACK
LOCATION: BACK

## 2023-06-05 ASSESSMENT — PAIN DESCRIPTION - ORIENTATION
ORIENTATION: RIGHT;LOWER;POSTERIOR
ORIENTATION: RIGHT;LOWER;POSTERIOR

## 2023-06-05 ASSESSMENT — PAIN SCALES - WONG BAKER: WONGBAKER_NUMERICALRESPONSE: 2

## 2023-06-05 ASSESSMENT — PAIN DESCRIPTION - DESCRIPTORS
DESCRIPTORS: SORE;TENDER
DESCRIPTORS: SORE;TENDER

## 2023-06-05 ASSESSMENT — PAIN SCALES - GENERAL
PAINLEVEL_OUTOF10: 8
PAINLEVEL_OUTOF10: 3

## 2023-06-05 NOTE — CARE COORDINATION
Transition of Care Plan:     RUR: 20%  Prior Level of Functioning: independent   Disposition: home  If SNF or IPR: Date FOC offered: na  Date FOC received:   Accepting facility:   Date authorization started with reference number:   Date authorization received and expires: Follow up appointments: PCP  DME needed: N/A- has a RW  Transportation at discharge: son, ETA 5:15PM  IM/IMM Medicare/ letter given: given on 6/5/23  Is patient a  and connected with [facility]? na              If yes, was [employer] transfer form completed and [facility] notified? Caregiver Contact: son  Discharge Caregiver contacted prior to discharge? Patient to contact  Care Conference needed? no  Barriers to discharge:  none       06/05/23 [facility] 287 Discharge   Transition of Care Consult (CM Consult) Discharge Planning   Services [address] Provided? No   Mode of Transport at Discharge Other (see comment)  (son)   Hospital Transport Time of Discharge 1715   Confirm Follow Up Transport Family       Chart reviewed. CM aware of discharge order. Met with pt at bedside to finalize and review d/c plan. Son will transport pt home this evening, ETA 5:15PM after work. Pt agreeable to [facility] services. FOC offered- pt prefers to use [facility]. Referral submitted via Careport which is pending review/acceptance. Pt confirms having a RW at home she can use. 2nd IMM reviewed and delivered. No barriers to d/c identified at this time. Pt cleared for D/C from CM standpoint.     SKYE Ho   Care Manager, [facility]

## 2023-06-05 NOTE — DISCHARGE SUMMARY
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  Hyponatremia  UTI  Bacteremia  Sepsis  Hypertension  Dyslipidemia  GERD  POTS  History of orthostasis  Coronary artery disease  Peripheral arterial disease  Dyslipidemia  Depression    Consultations:  IP CONSULT TO PHARMACY  IP CONSULT TO CASE MANAGEMENT      Brief Admission History/Reason for Admission Per Waqar Reyes MD:       [address] Problems:     Acute on chronic kidney disease stage III-IV  Hyponatremia  -Baseline creatinine is around 1.5-2 and currently creatinine is 3.64  -She did appear to be intravascularly volume depleted on admission even though she has bilateral pleural effusions  -There is some renal atrophy but otherwise no other issues with kidneys on CT abdomen  -Sodium is 128 on admission  Baseline is normal.   Plan  Continue po hydration at home. Na improving. Follow up with PCP in 1-2 weeks and nephro in 1-2 weeks for repeat labs. UTI  Bacteremia  Sepsis  -UA is abnormal but is not a clean-catch. Leukocyte esterase is large, nitrates negative, 4+ bacteria but there are a lot of moderate epithelial cells  -Lactic acid is within normal limits  -Blood cultures with probable K pneumoniae  -Urine culture with K pneumoniae pan-sensitive  Plan  -Plan for total of 10 days of coverage, po levaquin renal dosing     Hypertension  Dyslipidemia  GERD  POTS  History of orthostasis  Coronary artery disease  Peripheral arterial disease  Dyslipidemia  Depression  -Continue home metoprolol for HR control with POTS.   -Continue midodrine TID at home, added hold for SBP >120   -Continue PTA statin  -Continue PTA BuSpar, Cymbalta, Seroquel  -Continue PTA Xarelto which she probably is

## 2023-06-07 LAB
BACTERIA SPEC CULT: NORMAL
SERVICE CMNT-IMP: NORMAL

## 2023-06-08 NOTE — PROGRESS NOTES
Attempted to schedule PCP hospital follow up appointment. Unable to reach anyone, unable to leave voicemail. Pending patient discharge.  Dup[place] Failing, Care Management Assistant
I have reviewed discharge instructions with the patient. The patient verbalized understanding. Discharge medications reviewed with patient and appropriate educational materials and side effects teaching were provided. Follow-up appointments reviewed. Opportunity for questions and clarification was provided. Venous access removed without difficulty. Patient's belongings gathered and sent with patient. Patient is ready for discharge.      Dick Beverly RN
Physician Progress Note      PATIENT:               Evonen Ventura  CSN #:                  [id number]  :                       1960  ADMIT DATE:       2023 9:45 AM  DISCH DATE:        2023 7:40 PM  RESPONDING  PROVIDER #:        Naomy Puri MD          QUERY TEXT:    Patient admitted with sepsis. Documentation reflects \"+/- ATN \"   nephro   pn. If possible, please document in the progress notes and discharge summary   if *** was: The medical record reflects the following:  Risk Factors: Acute on chronic kidney disease stage III-IV, IV contrast  Clinical Indicators:  Nephro-Creatinine improving slowly, peaked 3.6, now   3.2 to 2.9; prerenal +/- ATN from hypotension and IV contrast.  Treatment: \"gentle hydration\" nephro cx  Thanks, Varun Ramírez RN/CDI [id number]  Options provided:  -- ATN  confirmed after study  -- ATN treated and resolved  -- ATN ruled out after study  -- Other - I will add my own diagnosis  -- Disagree - Not applicable / Not valid  -- Disagree - Clinically unable to determine / Unknown  -- Refer to Clinical Documentation Reviewer    PROVIDER RESPONSE TEXT:    ATN treated and resolved.     Query created by: Mg Vega on 2023 1:33 PM      Electronically signed by:  Naomy Puri MD 2023 4:00 PM
and other pertinent notes reviewed in last 24 hrs.     PMH/SH/FH reviewed and unchanged compared to H&P    Noreen Amato MD

## 2023-07-01 PROBLEM — N39.0 UTI (URINARY TRACT INFECTION): Status: RESOLVED | Noted: 2023-06-01 | Resolved: 2023-07-01

## 2023-10-04 RX ORDER — SODIUM CHLORIDE 9 MG/ML
25 INJECTION, SOLUTION INTRAVENOUS PRN
Status: DISCONTINUED | OUTPATIENT
Start: 2023-10-04 | End: 2023-10-05 | Stop reason: HOSPADM

## 2023-10-04 RX ORDER — SODIUM CHLORIDE 0.9 % (FLUSH) 0.9 %
5-40 SYRINGE (ML) INJECTION EVERY 12 HOURS SCHEDULED
Status: DISCONTINUED | OUTPATIENT
Start: 2023-10-04 | End: 2023-10-05 | Stop reason: HOSPADM

## 2023-10-04 RX ORDER — SODIUM CHLORIDE 0.9 % (FLUSH) 0.9 %
5-40 SYRINGE (ML) INJECTION PRN
Status: DISCONTINUED | OUTPATIENT
Start: 2023-10-04 | End: 2023-10-05 | Stop reason: HOSPADM

## 2023-10-05 ENCOUNTER — ANESTHESIA (OUTPATIENT)
Facility: HOSPITAL | Age: 63
End: 2023-10-05
Payer: MEDICARE

## 2023-10-05 ENCOUNTER — ANESTHESIA EVENT (OUTPATIENT)
Facility: HOSPITAL | Age: 63
End: 2023-10-05
Payer: MEDICARE

## 2023-10-05 ENCOUNTER — HOSPITAL ENCOUNTER (OUTPATIENT)
Facility: HOSPITAL | Age: 63
Setting detail: OUTPATIENT SURGERY
Discharge: HOME | End: 2023-10-05
Attending: INTERNAL MEDICINE | Admitting: INTERNAL MEDICINE
Payer: MEDICARE

## 2023-10-05 VITALS
HEIGHT: 67 IN | RESPIRATION RATE: 15 BRPM | SYSTOLIC BLOOD PRESSURE: 160 MMHG | WEIGHT: 179 LBS | HEART RATE: 69 BPM | TEMPERATURE: 97.9 F | DIASTOLIC BLOOD PRESSURE: 73 MMHG | BODY MASS INDEX: 28.09 KG/M2 | OXYGEN SATURATION: 92 %

## 2023-10-05 PROCEDURE — 3700000000 HC ANESTHESIA ATTENDED CARE: Performed by: INTERNAL MEDICINE

## 2023-10-05 PROCEDURE — 6360000002 HC RX W HCPCS: Performed by: NURSE ANESTHETIST, CERTIFIED REGISTERED

## 2023-10-05 PROCEDURE — 6370000000 HC RX 637 (ALT 250 FOR IP): Performed by: NURSE ANESTHETIST, CERTIFIED REGISTERED

## 2023-10-05 PROCEDURE — 2580000003 HC RX 258: Performed by: INTERNAL MEDICINE

## 2023-10-05 PROCEDURE — 7100000010 HC PHASE II RECOVERY - FIRST 15 MIN: Performed by: INTERNAL MEDICINE

## 2023-10-05 PROCEDURE — 2500000003 HC RX 250 WO HCPCS: Performed by: NURSE ANESTHETIST, CERTIFIED REGISTERED

## 2023-10-05 PROCEDURE — 3600007502: Performed by: INTERNAL MEDICINE

## 2023-10-05 PROCEDURE — 2709999900 HC NON-CHARGEABLE SUPPLY: Performed by: INTERNAL MEDICINE

## 2023-10-05 PROCEDURE — 7100000011 HC PHASE II RECOVERY - ADDTL 15 MIN: Performed by: INTERNAL MEDICINE

## 2023-10-05 PROCEDURE — 3700000001 HC ADD 15 MINUTES (ANESTHESIA): Performed by: INTERNAL MEDICINE

## 2023-10-05 PROCEDURE — 88305 TISSUE EXAM BY PATHOLOGIST: CPT

## 2023-10-05 PROCEDURE — 3600007512: Performed by: INTERNAL MEDICINE

## 2023-10-05 RX ADMIN — BENZOCAINE 1 EACH: 200 SPRAY DENTAL; ORAL; PERIODONTAL at 10:09

## 2023-10-05 RX ADMIN — PROPOFOL 20 MG: 10 INJECTION, EMULSION INTRAVENOUS at 10:17

## 2023-10-05 RX ADMIN — SODIUM CHLORIDE: 9 INJECTION, SOLUTION INTRAVENOUS at 10:08

## 2023-10-05 RX ADMIN — PROPOFOL 60 MG: 10 INJECTION, EMULSION INTRAVENOUS at 10:11

## 2023-10-05 RX ADMIN — LIDOCAINE HYDROCHLORIDE 100 MG: 20 INJECTION, SOLUTION EPIDURAL; INFILTRATION; INTRACAUDAL; PERINEURAL at 10:11

## 2023-10-05 ASSESSMENT — PAIN - FUNCTIONAL ASSESSMENT: PAIN_FUNCTIONAL_ASSESSMENT: NONE - DENIES PAIN

## 2023-10-05 NOTE — DISCHARGE INSTRUCTIONS
Theresamaiconakul Natan  [id number]  1960    EGD DISCHARGE INSTRUCTIONS  Discomfort:  Sore throat- throat lozenges or warm salt water gargle  redness at IV site- apply warm compress to area; if redness or soreness persist- contact your physician  Gaseous discomfort- walking, belching will help relieve any discomfort  You may not operate a vehicle for 12 hours  You may not engage in an occupation involving machinery or appliances for rest of today  You may not drink alcoholic beverages for at least 12 hours  Avoid making any critical decisions for at least 24 hour  DIET  You may have minimal sips at this time-- do not eat or drink for two hours. You may eat and drink after 1045am today  You may resume your regular diet - however -  remember your colon is empty and a heavy meal will produce gas. Avoid these foods:  vegetables, fried / greasy foods, carbonated drinks    MEDICATIONS:  [unfilled]    ACTIVITY  You may resume your normal daily activities until tomorrow AM;  Spend the remainder of the day resting -  avoid any strenuous activity. CALL M.D.   ANY SIGN OF   Increasing pain, nausea, vomiting  Abdominal distension (swelling)  New increased bleeding (oral or rectal)  Fever (chills)  Pain in chest area  Bloody discharge from nose or mouth  Shortness of breath    IMPRESSION:  -Zamorano's type mucosa in mid to distal esophagus from 25-39cm (Rosston classification K16K96); biopsied as mid and distal esophagus  -Normal stomach mucosa  -Normal duodenal mucosa    Follow-up Instructions:   Call Dr. Donna Bravo for the results of procedure / biopsy in 7-10 days   Telephone # [phone]  Repeat upper endoscopy in 3 years  Resume Xarelto tomorrow Blas Dave MD        Patient Education on Sedation / Analgesia Administered for Procedure      For 24 hours after general anesthesia or intravenous analgesia / sedation:  Have someone responsible help you with your care  Limit your activities  Do not drive and operate hazardous

## 2023-10-05 NOTE — OP NOTE
NAME:  Ethan Petersen   :   1960   MRN:   [id number]     Date/Time:  10/5/2023 10:27 AM    Esophagogastroduodenoscopy (EGD) Procedure Note    Procedure: Esophagogastroduodenoscopy with biopsy    Indication: Zamorano's esophagus  Pre-operative Diagnosis: see indication above  Post-operative Diagnosis: see findings below  :  Tiara Mar MD  Referring Provider:   -Raymond Pimentel, APRN - NP    Exam:  Airway: clear, no airway problems anticipated  Heart: RRR, without gallops or rubs  Lungs: clear bilaterally without wheezes, crackles, or rhonchi  Abdomen: soft, nontender, nondistended, bowel sounds present  Mental Status: awake, alert and oriented to person, place and time     Anethesia/Sedation:  MAC anesthesia Propofol 80mg IV  Procedure Details   After informed consent was obtained for the procedure, with all risks and benefits of procedure explained the patient was taken to the endoscopy suite and placed in the left lateral decubitus position. Following sequential administration of sedation as per above, the [id number] gastroscope was inserted into the mouth and advanced under direct vision to second portion of the duodenum. A careful inspection was made as the gastroscope was withdrawn, including a retroflexed view of the proximal stomach; findings and interventions are described below. Findings:    -Zamorano's type mucosa in mid to distal esophagus from 25-39cm (Escalante classification [id number]); biopsied as mid and distal esophagus  -Normal stomach mucosa  -Normal duodenal mucosa    Therapies:  biopsy of esophagus  Specimens: #! Distal esoph; #2 mid esoph  EBL:  None. Complications:   None; patient tolerated the procedure well.            Impression:    -Zamorano's type mucosa in mid to distal esophagus from 25-39cm (Escalante classification W84X21); biopsied as mid and distal esophagus  -Normal stomach mucosa  -Normal duodenal mucosa    Recommendations:  -Continue acid

## 2023-10-05 NOTE — ANESTHESIA POSTPROCEDURE EVALUATION
Department of Anesthesiology  Postprocedure Note    Patient: Sai العلي  MRN: [id number]  YOB: 1960  Date of evaluation: 10/5/2023      Procedure Summary     Date: 10/05/23 Room / Location: [facility] ENDO 02 / [facility] ENDOSCOPY    Anesthesia Start: 4290 Anesthesia Stop: 0265    Procedure: ESOPHAGOGASTRODUODENOSCOPY WITH BIOPSY (Upper GI Region) Diagnosis:       Zamorano's esophagus with dysplasia      (Zamorano's esophagus with dysplasia [K22.719])    Surgeons: Camelia Moser MD Responsible Provider: Lai Fernandez MD    Anesthesia Type: TIVA, MAC ASA Status: 3          Anesthesia Type: TIVA, MAC    Kiki Phase I: Kiki Score: 10    Kiki Phase II: Kiki Score: 10      Anesthesia Post Evaluation    Patient location during evaluation: PACU  Patient participation: complete - patient participated  Level of consciousness: awake and alert  Airway patency: patent  Nausea & Vomiting: no nausea  Complications: no  Cardiovascular status: hemodynamically stable  Respiratory status: acceptable  Hydration status: euvolemic

## 2023-10-05 NOTE — PROGRESS NOTES
Endoscopy Case End Note:    1018:  Procedure scope was pre-cleaned, per protocol, at bedside by Patience. 1018:  Report received from anesthesia Wolfgang Snowden CRNA. See anesthesia flowsheet for intra-procedure vital signs and events. 1022:  dentures returned to patient.

## 2023-10-05 NOTE — PROGRESS NOTES
Endoscopy Case End Note:    ***:  Procedure scope was pre-cleaned, per protocol, at bedside by ***.      ***:  Report received from anesthesia - ***. See anesthesia flowsheet for intra-procedure vital signs and events. ***:  *** returned to patient.

## 2024-04-29 ENCOUNTER — HOSPITAL ENCOUNTER (EMERGENCY)
Facility: HOSPITAL | Age: 64
Discharge: HOME | End: 2024-04-30
Attending: EMERGENCY MEDICINE
Payer: MEDICARE

## 2024-04-29 DIAGNOSIS — S30.0XXA LUMBAR CONTUSION, INITIAL ENCOUNTER: ICD-10-CM
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
## 2024-04-29 LAB
ALBUMIN SERPL-MCNC: 2.3 G/DL (ref 3.5–5)
ALBUMIN/GLOB SERPL: 0.6 (ref 1.1–2.2)
ALP SERPL-CCNC: 151 U/L (ref 45–117)
ALT SERPL-CCNC: 26 U/L (ref 12–78)
ANION GAP SERPL CALC-SCNC: 6 MMOL/L (ref 5–15)
AST SERPL-CCNC: 21 U/L (ref 15–37)
BASOPHILS # BLD: 0 K/UL (ref 0–0.1)
BASOPHILS NFR BLD: 0 % (ref 0–1)
BILIRUB SERPL-MCNC: 0.3 MG/DL (ref 0.2–1)
BUN SERPL-MCNC: 20 MG/DL (ref 6–20)
BUN/CREAT SERPL: 11 (ref 12–20)
CALCIUM SERPL-MCNC: 8.5 MG/DL (ref 8.5–10.1)
CHLORIDE SERPL-SCNC: 105 MMOL/L (ref 97–108)
CO2 SERPL-SCNC: 24 MMOL/L (ref 21–32)
CREAT SERPL-MCNC: 1.87 MG/DL (ref 0.55–1.02)
DIFFERENTIAL METHOD BLD: ABNORMAL
EOSINOPHIL # BLD: 0.2 K/UL (ref 0–0.4)
EOSINOPHIL NFR BLD: 2 % (ref 0–7)
ERYTHROCYTE [DISTWIDTH] IN BLOOD BY AUTOMATED COUNT: 14.8 % (ref 11.5–14.5)
GLOBULIN SER CALC-MCNC: 4 G/DL (ref 2–4)
GLUCOSE SERPL-MCNC: 117 MG/DL (ref 65–100)
HCT VFR BLD AUTO: 36.4 % (ref 35–47)
HGB BLD-MCNC: 11.4 G/DL (ref 11.5–16)
IMM GRANULOCYTES # BLD AUTO: 0.1 K/UL (ref 0–0.04)
IMM GRANULOCYTES NFR BLD AUTO: 1 % (ref 0–0.5)
LYMPHOCYTES # BLD: 2.6 K/UL (ref 0.8–3.5)
LYMPHOCYTES NFR BLD: 32 % (ref 12–49)
MCH RBC QN AUTO: 32.8 PG (ref 26–34)
MCHC RBC AUTO-ENTMCNC: 31.3 G/DL (ref 30–36.5)
MCV RBC AUTO: 104.6 FL (ref 80–99)
MONOCYTES # BLD: 0.5 K/UL (ref 0–1)
MONOCYTES NFR BLD: 6 % (ref 5–13)
NEUTS SEG # BLD: 4.8 K/UL (ref 1.8–8)
NEUTS SEG NFR BLD: 59 % (ref 32–75)
NRBC # BLD: 0 K/UL (ref 0–0.01)
NRBC BLD-RTO: 0 PER 100 WBC
PLATELET # BLD AUTO: 118 K/UL (ref 150–400)
PMV BLD AUTO: 10.4 FL (ref 8.9–12.9)
POTASSIUM SERPL-SCNC: 4 MMOL/L (ref 3.5–5.1)
PROT SERPL-MCNC: 6.3 G/DL (ref 6.4–8.2)
RBC # BLD AUTO: 3.48 M/UL (ref 3.8–5.2)
SODIUM SERPL-SCNC: 135 MMOL/L (ref 136–145)
WBC # BLD AUTO: 8.1 K/UL (ref 3.6–11)

## 2024-04-29 PROCEDURE — 96374 THER/PROPH/DIAG INJ IV PUSH: CPT

## 2024-04-29 PROCEDURE — 99284 EMERGENCY DEPT VISIT MOD MDM: CPT

## 2024-04-29 PROCEDURE — 85025 COMPLETE CBC W/AUTO DIFF WBC: CPT

## 2024-04-29 PROCEDURE — 80053 COMPREHEN METABOLIC PANEL: CPT

## 2024-04-29 PROCEDURE — 36415 COLL VENOUS BLD VENIPUNCTURE: CPT

## 2024-04-29 ASSESSMENT — LIFESTYLE VARIABLES
HOW MANY STANDARD DRINKS CONTAINING ALCOHOL DO YOU HAVE ON A TYPICAL DAY: 3 OR 4
HOW OFTEN DO YOU HAVE A DRINK CONTAINING ALCOHOL: 2-3 TIMES A WEEK

## 2024-04-30 ENCOUNTER — APPOINTMENT (OUTPATIENT)
Facility: HOSPITAL | Age: 64
End: 2024-04-30
Payer: MEDICARE

## 2024-04-30 VITALS
RESPIRATION RATE: 18 BRPM | HEART RATE: 99 BPM | HEIGHT: 67 IN | WEIGHT: 189 LBS | DIASTOLIC BLOOD PRESSURE: 76 MMHG | OXYGEN SATURATION: 100 % | TEMPERATURE: 97.5 F | SYSTOLIC BLOOD PRESSURE: 158 MMHG | BODY MASS INDEX: 29.66 KG/M2

## 2024-04-30 LAB
APPEARANCE UR: CLEAR
BACTERIA URNS QL MICRO: NEGATIVE /HPF
BILIRUB UR QL: NEGATIVE
COLOR UR: NORMAL
EPITH CASTS URNS QL MICRO: NORMAL /LPF
ETHANOL SERPL-MCNC: 62 MG/DL (ref 0–0.08)
GLUCOSE UR STRIP.AUTO-MCNC: NEGATIVE MG/DL
HGB UR QL STRIP: NEGATIVE
KETONES UR QL STRIP.AUTO: NEGATIVE MG/DL
LEUKOCYTE ESTERASE UR QL STRIP.AUTO: NEGATIVE
NITRITE UR QL STRIP.AUTO: NEGATIVE
PH UR STRIP: 5.5 (ref 5–8)
PROT UR STRIP-MCNC: NEGATIVE MG/DL
RBC #/AREA URNS HPF: NORMAL /HPF (ref 0–5)
SP GR UR REFRACTOMETRY: <1.005
TROPONIN I SERPL HS-MCNC: 6 NG/L (ref 0–51)
URINE CULTURE IF INDICATED: NORMAL
UROBILINOGEN UR QL STRIP.AUTO: 0.2 EU/DL (ref 0.2–1)
WBC URNS QL MICRO: NORMAL /HPF (ref 0–4)

## 2024-04-30 PROCEDURE — 6370000000 HC RX 637 (ALT 250 FOR IP): Performed by: EMERGENCY MEDICINE

## 2024-04-30 PROCEDURE — 2580000003 HC RX 258: Performed by: EMERGENCY MEDICINE

## 2024-04-30 PROCEDURE — 70450 CT HEAD/BRAIN W/O DYE: CPT

## 2024-04-30 PROCEDURE — 93005 ELECTROCARDIOGRAM TRACING: CPT | Performed by: EMERGENCY MEDICINE

## 2024-04-30 PROCEDURE — 81001 URINALYSIS AUTO W/SCOPE: CPT

## 2024-04-30 PROCEDURE — 82077 ASSAY SPEC XCP UR&BREATH IA: CPT

## 2024-04-30 PROCEDURE — 74176 CT ABD & PELVIS W/O CONTRAST: CPT

## 2024-04-30 PROCEDURE — 84484 ASSAY OF TROPONIN QUANT: CPT

## 2024-04-30 PROCEDURE — 2500000003 HC RX 250 WO HCPCS: Performed by: EMERGENCY MEDICINE

## 2024-04-30 PROCEDURE — 36415 COLL VENOUS BLD VENIPUNCTURE: CPT

## 2024-04-30 RX ORDER — OXYCODONE HYDROCHLORIDE 5 MG/1
5 TABLET ORAL
Status: COMPLETED | OUTPATIENT
Start: 2024-04-30 | End: 2024-04-30

## 2024-04-30 RX ORDER — HYDROMORPHONE HYDROCHLORIDE 1 MG/ML
0.5 INJECTION, SOLUTION INTRAMUSCULAR; INTRAVENOUS; SUBCUTANEOUS
Status: COMPLETED | OUTPATIENT
Start: 2024-04-30 | End: 2024-04-30

## 2024-04-30 RX ORDER — SODIUM CHLORIDE, SODIUM LACTATE, POTASSIUM CHLORIDE, AND CALCIUM CHLORIDE .6; .31; .03; .02 G/100ML; G/100ML; G/100ML; G/100ML
1000 INJECTION, SOLUTION INTRAVENOUS
Status: COMPLETED | OUTPATIENT
Start: 2024-04-30 | End: 2024-04-30

## 2024-04-30 RX ORDER — OXYCODONE HYDROCHLORIDE 5 MG/1
5 TABLET ORAL EVERY 6 HOURS PRN
Qty: 12 TABLET | Refills: 0 | Status: SHIPPED | OUTPATIENT
Start: 2024-04-30 | End: 2024-05-03

## 2024-04-30 RX ADMIN — HYDROMORPHONE HYDROCHLORIDE 0.5 MG: 1 INJECTION, SOLUTION INTRAMUSCULAR; INTRAVENOUS; SUBCUTANEOUS at 01:35

## 2024-04-30 RX ADMIN — OXYCODONE 5 MG: 5 TABLET ORAL at 04:27

## 2024-04-30 RX ADMIN — SODIUM CHLORIDE, POTASSIUM CHLORIDE, SODIUM LACTATE AND CALCIUM CHLORIDE 1000 ML: 600; 310; 30; 20 INJECTION, SOLUTION INTRAVENOUS at 01:58

## 2024-04-30 ASSESSMENT — PAIN SCALES - GENERAL
PAINLEVEL_OUTOF10: 7
PAINLEVEL_OUTOF10: 10

## 2024-04-30 ASSESSMENT — PAIN DESCRIPTION - LOCATION
LOCATION: BACK
LOCATION: BACK

## 2024-04-30 ASSESSMENT — PAIN DESCRIPTION - ORIENTATION
ORIENTATION: LOWER
ORIENTATION: LOWER

## 2024-04-30 NOTE — ED NOTES
Patient discharged from the ED by Mckayla DELANEY. Diagnosis, medications, precautions and follow-ups were reviewed with the patient/family. Questions were asked and answered prior to departure. Patient departed the ED via strecher and was accompanied by [facility].

## 2024-04-30 NOTE — DISCHARGE INSTRUCTIONS
You were seen in the emergency department for your symptoms.  Please follow-up with your primary care doctor.  Please use the oxycodone for severe back pain.  Return for new or worsening symptoms anytime.

## 2024-04-30 NOTE — ED PROVIDER NOTES
you. Read the directions carefully, and ask your doctor or other care provider to review them with you.                   Where to Get Your Medications        These medications were sent to [id number] - [place], [place] - [address] [phone] - F [phone] [address] [facility] [address]      Phone: [phone]   oxyCODONE 5 MG immediate release tablet           DISCONTINUED MEDICATIONS:  Current Discharge Medication List          I am the Primary Clinician of Record.   Osman Block MD (electronically signed)    (Please note that parts of this dictation were completed with voice recognition software. Quite often unanticipated grammatical, syntax, homophones, and other interpretive errors are inadvertently transcribed by the computer software. Please disregards these errors. Please excuse any errors that have escaped final proofreading.)         Osman Block MD  05/01/24 0729

## 2024-05-01 LAB
EKG ATRIAL RATE: 81 BPM
EKG DIAGNOSIS: NORMAL
EKG P AXIS: 22 DEGREES
EKG P-R INTERVAL: 148 MS
EKG Q-T INTERVAL: 414 MS
EKG QRS DURATION: 106 MS
EKG QTC CALCULATION (BAZETT): 480 MS
EKG R AXIS: 0 DEGREES
EKG T AXIS: 38 DEGREES
EKG VENTRICULAR RATE: 81 BPM

## 2025-08-26 RX ORDER — QUETIAPINE FUMARATE 100 MG/1
100 TABLET, FILM COATED ORAL 2 TIMES DAILY
COMMUNITY

## 2025-08-26 RX ORDER — CEPHALEXIN 500 MG/1
500 CAPSULE ORAL 3 TIMES DAILY
COMMUNITY

## 2025-08-26 RX ORDER — CALCITRIOL 0.25 UG/1
0.25 CAPSULE, LIQUID FILLED ORAL DAILY
COMMUNITY
Start: 2025-03-11

## 2025-08-26 RX ORDER — CITALOPRAM HYDROBROMIDE 10 MG/1
10 TABLET ORAL DAILY
COMMUNITY

## 2025-08-26 RX ORDER — SODIUM BICARBONATE 650 MG/1
1300 TABLET ORAL 3 TIMES DAILY
COMMUNITY
Start: 2025-08-13 | End: 2025-09-12

## 2025-08-26 RX ORDER — MAGNESIUM L-LACTATE 84 MG
84 TABLET, EXTENDED RELEASE ORAL 2 TIMES DAILY
COMMUNITY

## 2025-08-26 RX ORDER — PHENOL 1.4 %
10 AEROSOL, SPRAY (ML) MUCOUS MEMBRANE NIGHTLY PRN
COMMUNITY

## 2025-08-26 RX ORDER — MULTIVITAMIN,THERAPEUTIC
1 TABLET ORAL DAILY
COMMUNITY

## 2025-08-26 RX ORDER — VITAMIN B COMPLEX
1 CAPSULE ORAL DAILY
COMMUNITY

## 2025-08-26 RX ORDER — FOLIC ACID 1 MG/1
1 TABLET ORAL DAILY
COMMUNITY

## 2025-08-26 RX ORDER — AMLODIPINE BESYLATE 5 MG/1
5 TABLET ORAL DAILY
COMMUNITY

## 2025-08-26 RX ORDER — LIDOCAINE 50 MG/G
1 PATCH TOPICAL DAILY PRN
COMMUNITY

## 2025-08-26 RX ORDER — CYANOCOBALAMIN 1000 UG/ML
1000 INJECTION, SOLUTION INTRAMUSCULAR; SUBCUTANEOUS
COMMUNITY

## 2025-08-29 ENCOUNTER — ANESTHESIA (OUTPATIENT)
Facility: HOSPITAL | Age: 65
End: 2025-08-29
Payer: MEDICARE

## 2025-08-29 ENCOUNTER — HOSPITAL ENCOUNTER (OUTPATIENT)
Facility: HOSPITAL | Age: 65
Setting detail: OUTPATIENT SURGERY
Discharge: HOME | End: 2025-08-29
Attending: SURGERY | Admitting: SURGERY
Payer: MEDICARE

## 2025-08-29 ENCOUNTER — ANESTHESIA EVENT (OUTPATIENT)
Facility: HOSPITAL | Age: 65
End: 2025-08-29
Payer: MEDICARE

## 2025-08-29 VITALS
DIASTOLIC BLOOD PRESSURE: 70 MMHG | OXYGEN SATURATION: 94 % | HEART RATE: 66 BPM | SYSTOLIC BLOOD PRESSURE: 160 MMHG | RESPIRATION RATE: 14 BRPM | BODY MASS INDEX: 23.54 KG/M2 | TEMPERATURE: 97.6 F | WEIGHT: 150 LBS | HEIGHT: 67 IN

## 2025-08-29 DIAGNOSIS — N18.6 ESRD (END STAGE RENAL DISEASE) (HCC): Primary | ICD-10-CM

## 2025-08-29 LAB
ANION GAP BLD CALC-SCNC: 9.8 MMOL/L (ref 10–20)
CA-I BLD-MCNC: 1.2 MMOL/L (ref 1.15–1.33)
CHLORIDE BLD-SCNC: 104 MMOL/L (ref 98–107)
CO2 BLD-SCNC: 30.2 MMOL/L (ref 21–32)
CREAT BLD-MCNC: 2.2 MG/DL (ref 0.6–1.3)
GLUCOSE BLD STRIP.AUTO-MCNC: 89 MG/DL (ref 65–117)
GLUCOSE BLD-MCNC: 90 MG/DL (ref 74–99)
POTASSIUM BLD-SCNC: 3.7 MMOL/L (ref 3.5–5.1)
SERVICE CMNT-IMP: ABNORMAL
SERVICE CMNT-IMP: NORMAL
SODIUM BLD-SCNC: 144 MMOL/L (ref 136–145)

## 2025-08-29 PROCEDURE — 6370000000 HC RX 637 (ALT 250 FOR IP): Performed by: ANESTHESIOLOGY

## 2025-08-29 PROCEDURE — 3700000000 HC ANESTHESIA ATTENDED CARE: Performed by: SURGERY

## 2025-08-29 PROCEDURE — 2500000003 HC RX 250 WO HCPCS: Performed by: SURGERY

## 2025-08-29 PROCEDURE — 3700000001 HC ADD 15 MINUTES (ANESTHESIA): Performed by: SURGERY

## 2025-08-29 PROCEDURE — 82962 GLUCOSE BLOOD TEST: CPT

## 2025-08-29 PROCEDURE — 7100000000 HC PACU RECOVERY - FIRST 15 MIN: Performed by: SURGERY

## 2025-08-29 PROCEDURE — 80047 BASIC METABLC PNL IONIZED CA: CPT

## 2025-08-29 PROCEDURE — 7100000010 HC PHASE II RECOVERY - FIRST 15 MIN: Performed by: SURGERY

## 2025-08-29 PROCEDURE — 7100000001 HC PACU RECOVERY - ADDTL 15 MIN: Performed by: SURGERY

## 2025-08-29 PROCEDURE — 2580000003 HC RX 258: Performed by: NURSE ANESTHETIST, CERTIFIED REGISTERED

## 2025-08-29 PROCEDURE — 6360000002 HC RX W HCPCS: Performed by: ANESTHESIOLOGY

## 2025-08-29 PROCEDURE — 6360000002 HC RX W HCPCS: Performed by: SURGERY

## 2025-08-29 PROCEDURE — 2720000010 HC SURG SUPPLY STERILE: Performed by: SURGERY

## 2025-08-29 PROCEDURE — 3600000012 HC SURGERY LEVEL 2 ADDTL 15MIN: Performed by: SURGERY

## 2025-08-29 PROCEDURE — 7100000011 HC PHASE II RECOVERY - ADDTL 15 MIN: Performed by: SURGERY

## 2025-08-29 PROCEDURE — 6360000002 HC RX W HCPCS: Performed by: NURSE ANESTHETIST, CERTIFIED REGISTERED

## 2025-08-29 PROCEDURE — 2709999900 HC NON-CHARGEABLE SUPPLY: Performed by: SURGERY

## 2025-08-29 PROCEDURE — 2580000003 HC RX 258: Performed by: SURGERY

## 2025-08-29 PROCEDURE — 3600000002 HC SURGERY LEVEL 2 BASE: Performed by: SURGERY

## 2025-08-29 PROCEDURE — 64415 NJX AA&/STRD BRCH PLXS IMG: CPT | Performed by: ANESTHESIOLOGY

## 2025-08-29 RX ORDER — LIDOCAINE HYDROCHLORIDE 20 MG/ML
INJECTION, SOLUTION EPIDURAL; INFILTRATION; INTRACAUDAL; PERINEURAL
Status: DISCONTINUED | OUTPATIENT
Start: 2025-08-29 | End: 2025-08-29 | Stop reason: SDUPTHER

## 2025-08-29 RX ORDER — ACETAMINOPHEN 500 MG
1000 TABLET ORAL ONCE
Status: COMPLETED | OUTPATIENT
Start: 2025-08-29 | End: 2025-08-29

## 2025-08-29 RX ORDER — ROPIVACAINE HYDROCHLORIDE 5 MG/ML
INJECTION, SOLUTION EPIDURAL; INFILTRATION; PERINEURAL
Status: COMPLETED | OUTPATIENT
Start: 2025-08-29 | End: 2025-08-29

## 2025-08-29 RX ORDER — HYDROCODONE BITARTRATE AND ACETAMINOPHEN 5; 325 MG/1; MG/1
1 TABLET ORAL EVERY 4 HOURS PRN
Qty: 18 TABLET | Refills: 0 | Status: SHIPPED | OUTPATIENT
Start: 2025-08-29 | End: 2025-09-01

## 2025-08-29 RX ORDER — HEPARIN SODIUM 5000 [USP'U]/ML
INJECTION, SOLUTION INTRAVENOUS; SUBCUTANEOUS
Status: DISCONTINUED | OUTPATIENT
Start: 2025-08-29 | End: 2025-08-29 | Stop reason: SDUPTHER

## 2025-08-29 RX ORDER — PROPOFOL 10 MG/ML
INJECTION, EMULSION INTRAVENOUS
Status: DISCONTINUED | OUTPATIENT
Start: 2025-08-29 | End: 2025-08-29 | Stop reason: SDUPTHER

## 2025-08-29 RX ORDER — HYDROCODONE BITARTRATE AND ACETAMINOPHEN 5; 325 MG/1; MG/1
1 TABLET ORAL ONCE
Refills: 0 | Status: DISCONTINUED | OUTPATIENT
Start: 2025-08-29 | End: 2025-08-29 | Stop reason: HOSPADM

## 2025-08-29 RX ORDER — ONDANSETRON 2 MG/ML
INJECTION INTRAMUSCULAR; INTRAVENOUS
Status: DISCONTINUED | OUTPATIENT
Start: 2025-08-29 | End: 2025-08-29 | Stop reason: SDUPTHER

## 2025-08-29 RX ORDER — SODIUM CHLORIDE 9 MG/ML
INJECTION, SOLUTION INTRAVENOUS CONTINUOUS
Status: DISCONTINUED | OUTPATIENT
Start: 2025-08-29 | End: 2025-08-29 | Stop reason: HOSPADM

## 2025-08-29 RX ORDER — DEXAMETHASONE SODIUM PHOSPHATE 4 MG/ML
INJECTION, SOLUTION INTRA-ARTICULAR; INTRALESIONAL; INTRAMUSCULAR; INTRAVENOUS; SOFT TISSUE
Status: DISCONTINUED | OUTPATIENT
Start: 2025-08-29 | End: 2025-08-29 | Stop reason: SDUPTHER

## 2025-08-29 RX ORDER — 0.9 % SODIUM CHLORIDE 0.9 %
INTRAVENOUS SOLUTION INTRAVENOUS
Status: DISCONTINUED | OUTPATIENT
Start: 2025-08-29 | End: 2025-08-29 | Stop reason: SDUPTHER

## 2025-08-29 RX ADMIN — HEPARIN SODIUM 5000 UNITS: 5000 INJECTION, SOLUTION INTRAVENOUS; SUBCUTANEOUS at 10:15

## 2025-08-29 RX ADMIN — VANCOMYCIN HYDROCHLORIDE 1000 MG: 1 INJECTION, POWDER, LYOPHILIZED, FOR SOLUTION INTRAVENOUS at 09:42

## 2025-08-29 RX ADMIN — PROPOFOL 85 MCG/KG/MIN: 10 INJECTION, EMULSION INTRAVENOUS at 09:43

## 2025-08-29 RX ADMIN — ACETAMINOPHEN 1000 MG: 500 TABLET ORAL at 13:37

## 2025-08-29 RX ADMIN — DEXAMETHASONE SODIUM PHOSPHATE 4 MG: 4 INJECTION INTRA-ARTICULAR; INTRALESIONAL; INTRAMUSCULAR; INTRAVENOUS; SOFT TISSUE at 10:10

## 2025-08-29 RX ADMIN — LIDOCAINE HYDROCHLORIDE 60 MG: 20 INJECTION, SOLUTION EPIDURAL; INFILTRATION; INTRACAUDAL; PERINEURAL at 09:42

## 2025-08-29 RX ADMIN — WATER 2000 MG: 1 INJECTION INTRAMUSCULAR; INTRAVENOUS; SUBCUTANEOUS at 09:42

## 2025-08-29 RX ADMIN — PROPOFOL 100 MG: 10 INJECTION, EMULSION INTRAVENOUS at 09:59

## 2025-08-29 RX ADMIN — SODIUM CHLORIDE 150 ML: 9 INJECTION, SOLUTION INTRAVENOUS at 10:55

## 2025-08-29 RX ADMIN — ROPIVACAINE HYDROCHLORIDE 30 ML: 5 INJECTION, SOLUTION EPIDURAL; INFILTRATION; PERINEURAL at 09:24

## 2025-08-29 RX ADMIN — PROPOFOL 50 MG: 10 INJECTION, EMULSION INTRAVENOUS at 09:42

## 2025-08-29 RX ADMIN — ONDANSETRON 4 MG: 2 INJECTION, SOLUTION INTRAMUSCULAR; INTRAVENOUS at 10:10

## 2025-08-29 ASSESSMENT — PAIN SCALES - GENERAL: PAINLEVEL_OUTOF10: 2

## 2025-08-29 ASSESSMENT — PAIN DESCRIPTION - DESCRIPTORS: DESCRIPTORS: ACHING

## 2025-08-29 ASSESSMENT — ENCOUNTER SYMPTOMS: SHORTNESS OF BREATH: 1

## 2025-08-29 ASSESSMENT — PAIN DESCRIPTION - LOCATION: LOCATION: HEAD

## (undated) DEVICE — SYR 10ML LUER LOK 1/5ML GRAD --

## (undated) DEVICE — CONTAINER SPEC 20 ML LID NEUT BUFF FORMALIN 10 % POLYPR STS

## (undated) DEVICE — SUTURE VCRL SZ 2-0 L36IN ABSRB UD L36MM CT-1 1/2 CIR J945H

## (undated) DEVICE — SUT PROL 6-0 24IN BV1 DA BLU --

## (undated) DEVICE — YANKAUER,TAPERED BULBOUS TIP,W/O VENT: Brand: MEDLINE

## (undated) DEVICE — SYRINGE MARK SCALE W/ LL TIP 3ML 200 S/C

## (undated) DEVICE — Device

## (undated) DEVICE — STRAP POS FOAM SFT STR FOR KNEE BODY

## (undated) DEVICE — CATHETER IV 20GA L1.16IN OD1.0414-1.1176MM ID0.762-0.8382MM

## (undated) DEVICE — SUTURE PROL SZ 6-0 L24IN NONABSORBABLE BLU L9.3MM BV-1 3/8 8805H

## (undated) DEVICE — SPONGE GZ KERLIX W4.5INXL4.1YD COT 6 PLY OPN WV STRETCHABLE

## (undated) DEVICE — CATH IV AUTOGRD BC PNK 20GA 25 -- INSYTE

## (undated) DEVICE — LOOP,VESSEL,MAXI,BLUE,2/PK,STERILE: Brand: MEDLINE

## (undated) DEVICE — SUT PROL 7-0 24IN BV1 DA BLU --

## (undated) DEVICE — FOGARTY ARTERIAL EMBOLECTOMY CATHETER 2F 60CM: Brand: FOGARTY

## (undated) DEVICE — GLOVE SURG SZ 8 L12IN FNGR THK94MIL STD WHT LTX FREE

## (undated) DEVICE — GOWN SURG 2XL L49IN BLU NONREINFORCED SET IN SL HK LOOP

## (undated) DEVICE — TAPE SURG W4INXL10YD SFT CLTH H2O RESIST MEDIPORE H

## (undated) DEVICE — SET GRAV CK VLV NEEDLESS ST 3 GANGED 4WAY STPCOCK HI FLO 10

## (undated) DEVICE — CATHETER IV 24GA L0.75IN OD0.6604-0.7366MM

## (undated) DEVICE — HYPODERMIC SAFETY NEEDLE: Brand: MAGELLAN

## (undated) DEVICE — BASIN EMSIS 16OZ GRAPHITE PLAS KID SHP MOLD GRAD FOR ORAL

## (undated) DEVICE — SOLUTION IV 500 ML 0.9 NACL INJ EXCEL CONTAINER USP LF

## (undated) DEVICE — SUTURE PROL SZ 5-0 L24IN NONABSORBABLE BLU RB-2 L13IN 1/2 8554H

## (undated) DEVICE — EVACUATOR SMOKE L 10 FT SMOKE MANAGEMENT EXTENDED EDGE ELECTRODE STERILE DISP

## (undated) DEVICE — LOOP VES W13MM THK09MM MINI RED SIL FLD REPELLENT

## (undated) DEVICE — SYR 3ML LL TIP 1/10ML GRAD --

## (undated) DEVICE — IV START KIT: Brand: MEDLINE

## (undated) DEVICE — CATHETER IV 18GA L1.16IN OD1.27-1.3462MM ID0.9398-1.016MM

## (undated) DEVICE — AGENT HEMSTAT W4XL4IN OXIDIZED REGENERATED CELOS ABSRB SFT

## (undated) DEVICE — SUTURE VICRYL + SZ 3-0 L27IN ABSRB UD L26MM SH 1/2 CIR VCP416H

## (undated) DEVICE — SPONGE GZ W4XL4IN COT 12 PLY TYP VII WVN C FLD DSGN STERILE

## (undated) DEVICE — GOWN,SIRUS,NONRNF,SETINSLV,2XL,18/CS: Brand: MEDLINE

## (undated) DEVICE — FORCEPS BX L160CM DIA8MM GRSP DISECT CUP TIP NONLOCKING ROT

## (undated) DEVICE — Z DISCONTINUED PER MEDLINE LINE GAS SAMPLING O2/CO2 LNG AD 13 FT NSL W/ TBNG FILTERLINE

## (undated) DEVICE — BAG SPEC BIOHZRD 10 X 10 IN --

## (undated) DEVICE — SPONGE GZ W4XL4IN COT RADPQ HIGHLY ABSRB

## (undated) DEVICE — PROVE COVER: Brand: UNBRANDED

## (undated) DEVICE — VASCULAR-MRMC: Brand: MEDLINE INDUSTRIES, INC.

## (undated) DEVICE — TOTAL TRAY, 16FR 10ML SIL FOLEY, URN: Brand: MEDLINE

## (undated) DEVICE — PROBE VASC 8MHZ WTRPRF

## (undated) DEVICE — NEONATAL-ADULT SPO2 SENSOR: Brand: NELLCOR

## (undated) DEVICE — TOWEL 4 PLY TISS 19X30 SUE WHT

## (undated) DEVICE — Z DISCONTINUED NO SUB IDED TAPE UMB W1/8XL36IN WHT COT STRND NONRADIOPAQUE

## (undated) DEVICE — STAPLER SKIN H3.9MM WIRE DIA0.58MM CRWN 6.9MM 35 STPL ROT

## (undated) DEVICE — CATHETER IV 22GA L1IN OD0.8382-0.9144MM ID0.6096-0.6858MM 382523

## (undated) DEVICE — CLIP INT SM WIDE RED TI TRNSVRS GRV CHEVRON SHP W PRECIS

## (undated) DEVICE — SYSTEM REPROC CBL 3 LD DISPOSABLE

## (undated) DEVICE — SOL INJ SOD CL 0.9% 500ML BG --

## (undated) DEVICE — ELECTRODE,RADIOTRANSLUCENT,FOAM,5PK: Brand: MEDLINE

## (undated) DEVICE — SUTURE VCRL SZ 3-0 L27IN ABSRB UD L26MM SH 1/2 CIR J416H

## (undated) DEVICE — SUT SLK 2-0SH 30IN BLK --

## (undated) DEVICE — SET ADMIN 16ML TBNG L100IN 2 Y INJ SITE IV PIGGY BK DISP (ORDER IN MULIPLES OF 48)

## (undated) DEVICE — 1200 GUARD II KIT W/5MM TUBE W/O VAC TUBE: Brand: GUARDIAN

## (undated) DEVICE — 450 ML BOTTLE OF 0.05% CHLORHEXIDINE GLUCONATE IN 99.95% STERILE WATER FOR IRRIGATION, USP AND APPLICATOR.: Brand: IRRISEPT ANTIMICROBIAL WOUND LAVAGE

## (undated) DEVICE — BLOCK BITE ENDOSCP AD 21 MM W/ DIL BLU LF DISP